# Patient Record
Sex: MALE | Race: BLACK OR AFRICAN AMERICAN
[De-identification: names, ages, dates, MRNs, and addresses within clinical notes are randomized per-mention and may not be internally consistent; named-entity substitution may affect disease eponyms.]

---

## 2019-12-23 PROBLEM — Z00.00 ENCOUNTER FOR PREVENTIVE HEALTH EXAMINATION: Status: ACTIVE | Noted: 2019-12-23

## 2019-12-29 ENCOUNTER — FORM ENCOUNTER (OUTPATIENT)
Age: 59
End: 2019-12-29

## 2019-12-30 ENCOUNTER — OUTPATIENT (OUTPATIENT)
Dept: OUTPATIENT SERVICES | Facility: HOSPITAL | Age: 59
LOS: 1 days | End: 2019-12-30
Payer: COMMERCIAL

## 2019-12-30 ENCOUNTER — APPOINTMENT (OUTPATIENT)
Dept: RADIOLOGY | Facility: CLINIC | Age: 59
End: 2019-12-30

## 2019-12-30 ENCOUNTER — APPOINTMENT (OUTPATIENT)
Dept: ORTHOPEDIC SURGERY | Facility: CLINIC | Age: 59
End: 2019-12-30
Payer: COMMERCIAL

## 2019-12-30 VITALS — BODY MASS INDEX: 27.98 KG/M2 | RESPIRATION RATE: 16 BRPM | WEIGHT: 225 LBS | HEIGHT: 75 IN

## 2019-12-30 DIAGNOSIS — Z82.3 FAMILY HISTORY OF STROKE: ICD-10-CM

## 2019-12-30 DIAGNOSIS — Z83.3 FAMILY HISTORY OF DIABETES MELLITUS: ICD-10-CM

## 2019-12-30 DIAGNOSIS — Z87.891 PERSONAL HISTORY OF NICOTINE DEPENDENCE: ICD-10-CM

## 2019-12-30 DIAGNOSIS — Z78.9 OTHER SPECIFIED HEALTH STATUS: ICD-10-CM

## 2019-12-30 DIAGNOSIS — Z82.49 FAMILY HISTORY OF ISCHEMIC HEART DISEASE AND OTHER DISEASES OF THE CIRCULATORY SYSTEM: ICD-10-CM

## 2019-12-30 PROCEDURE — 99204 OFFICE O/P NEW MOD 45 MIN: CPT | Mod: 25

## 2019-12-30 PROCEDURE — 20611 DRAIN/INJ JOINT/BURSA W/US: CPT | Mod: LT

## 2019-12-30 PROCEDURE — 73564 X-RAY EXAM KNEE 4 OR MORE: CPT | Mod: 26,LT

## 2019-12-30 RX ORDER — EMTRICITABINE AND TENOFOVIR ALAFENAMIDE 120; 15 MG/1; MG/1
TABLET ORAL
Refills: 0 | Status: ACTIVE | COMMUNITY

## 2020-03-02 ENCOUNTER — APPOINTMENT (OUTPATIENT)
Dept: ORTHOPEDIC SURGERY | Facility: CLINIC | Age: 60
End: 2020-03-02
Payer: COMMERCIAL

## 2020-03-02 PROCEDURE — 20611 DRAIN/INJ JOINT/BURSA W/US: CPT | Mod: LT

## 2020-06-02 ENCOUNTER — APPOINTMENT (OUTPATIENT)
Dept: ORTHOPEDIC SURGERY | Facility: CLINIC | Age: 60
End: 2020-06-02
Payer: COMMERCIAL

## 2020-06-02 PROCEDURE — 20611 DRAIN/INJ JOINT/BURSA W/US: CPT | Mod: LT

## 2020-09-01 ENCOUNTER — APPOINTMENT (OUTPATIENT)
Dept: ORTHOPEDIC SURGERY | Facility: CLINIC | Age: 60
End: 2020-09-01
Payer: COMMERCIAL

## 2020-09-01 PROCEDURE — 20610 DRAIN/INJ JOINT/BURSA W/O US: CPT | Mod: LT

## 2020-10-01 ENCOUNTER — APPOINTMENT (OUTPATIENT)
Dept: ORTHOPEDIC SURGERY | Facility: CLINIC | Age: 60
End: 2020-10-01

## 2021-01-05 ENCOUNTER — APPOINTMENT (OUTPATIENT)
Dept: ORTHOPEDIC SURGERY | Facility: CLINIC | Age: 61
End: 2021-01-05
Payer: COMMERCIAL

## 2021-01-05 PROCEDURE — 99072 ADDL SUPL MATRL&STAF TM PHE: CPT

## 2021-01-05 PROCEDURE — 99214 OFFICE O/P EST MOD 30 MIN: CPT | Mod: 25

## 2021-01-05 PROCEDURE — 20610 DRAIN/INJ JOINT/BURSA W/O US: CPT | Mod: LT

## 2021-01-06 ENCOUNTER — FORM ENCOUNTER (OUTPATIENT)
Age: 61
End: 2021-01-06

## 2021-02-01 ENCOUNTER — FORM ENCOUNTER (OUTPATIENT)
Age: 61
End: 2021-02-01

## 2021-02-16 ENCOUNTER — FORM ENCOUNTER (OUTPATIENT)
Age: 61
End: 2021-02-16

## 2021-02-17 ENCOUNTER — FORM ENCOUNTER (OUTPATIENT)
Age: 61
End: 2021-02-17

## 2021-04-20 ENCOUNTER — APPOINTMENT (OUTPATIENT)
Dept: ORTHOPEDIC SURGERY | Facility: CLINIC | Age: 61
End: 2021-04-20
Payer: COMMERCIAL

## 2021-04-20 PROCEDURE — 99072 ADDL SUPL MATRL&STAF TM PHE: CPT

## 2021-04-20 PROCEDURE — 20610 DRAIN/INJ JOINT/BURSA W/O US: CPT | Mod: LT

## 2021-07-22 ENCOUNTER — APPOINTMENT (OUTPATIENT)
Dept: ORTHOPEDIC SURGERY | Facility: CLINIC | Age: 61
End: 2021-07-22

## 2021-07-29 ENCOUNTER — APPOINTMENT (OUTPATIENT)
Dept: ORTHOPEDIC SURGERY | Facility: CLINIC | Age: 61
End: 2021-07-29
Payer: COMMERCIAL

## 2021-07-29 PROCEDURE — 20610 DRAIN/INJ JOINT/BURSA W/O US: CPT | Mod: LT

## 2021-10-28 ENCOUNTER — APPOINTMENT (OUTPATIENT)
Dept: ORTHOPEDIC SURGERY | Facility: CLINIC | Age: 61
End: 2021-10-28
Payer: COMMERCIAL

## 2021-10-28 VITALS — WEIGHT: 225 LBS | RESPIRATION RATE: 16 BRPM | BODY MASS INDEX: 27.98 KG/M2 | HEIGHT: 75 IN

## 2021-10-28 PROCEDURE — 20610 DRAIN/INJ JOINT/BURSA W/O US: CPT | Mod: LT

## 2022-03-07 ENCOUNTER — APPOINTMENT (OUTPATIENT)
Dept: ORTHOPEDIC SURGERY | Facility: CLINIC | Age: 62
End: 2022-03-07
Payer: COMMERCIAL

## 2022-03-07 VITALS — RESPIRATION RATE: 16 BRPM | WEIGHT: 225 LBS | HEIGHT: 75 IN | BODY MASS INDEX: 27.98 KG/M2

## 2022-03-07 PROCEDURE — 20610 DRAIN/INJ JOINT/BURSA W/O US: CPT | Mod: LT

## 2022-03-07 RX ORDER — OLMESARTAN MEDOXOMIL 5 MG/1
5 TABLET, FILM COATED ORAL
Qty: 60 | Refills: 0 | Status: DISCONTINUED | COMMUNITY
Start: 2022-02-07

## 2022-03-07 RX ORDER — AZITHROMYCIN 500 MG/1
500 TABLET, FILM COATED ORAL
Qty: 10 | Refills: 0 | Status: DISCONTINUED | COMMUNITY
Start: 2021-09-15

## 2022-03-07 RX ORDER — SULFAMETHOXAZOLE AND TRIMETHOPRIM 800; 160 MG/1; MG/1
800-160 TABLET ORAL
Qty: 28 | Refills: 0 | Status: DISCONTINUED | COMMUNITY
Start: 2021-10-14

## 2022-03-07 RX ORDER — AZITHROMYCIN 250 MG/1
250 TABLET, FILM COATED ORAL
Qty: 6 | Refills: 0 | Status: DISCONTINUED | COMMUNITY
Start: 2021-10-28

## 2022-03-08 ENCOUNTER — APPOINTMENT (OUTPATIENT)
Dept: ORTHOPEDIC SURGERY | Facility: CLINIC | Age: 62
End: 2022-03-08

## 2022-07-11 ENCOUNTER — APPOINTMENT (OUTPATIENT)
Dept: ORTHOPEDIC SURGERY | Facility: CLINIC | Age: 62
End: 2022-07-11

## 2022-07-11 VITALS — BODY MASS INDEX: 27.98 KG/M2 | WEIGHT: 225 LBS | HEIGHT: 75 IN | RESPIRATION RATE: 16 BRPM

## 2022-07-11 PROCEDURE — 20610 DRAIN/INJ JOINT/BURSA W/O US: CPT | Mod: LT

## 2022-10-17 ENCOUNTER — APPOINTMENT (OUTPATIENT)
Dept: ORTHOPEDIC SURGERY | Facility: CLINIC | Age: 62
End: 2022-10-17

## 2022-10-17 VITALS — WEIGHT: 225 LBS | BODY MASS INDEX: 27.98 KG/M2 | HEIGHT: 75 IN | RESPIRATION RATE: 16 BRPM

## 2022-10-17 PROCEDURE — 20610 DRAIN/INJ JOINT/BURSA W/O US: CPT | Mod: LT

## 2022-10-18 ENCOUNTER — FORM ENCOUNTER (OUTPATIENT)
Age: 62
End: 2022-10-18

## 2023-01-30 ENCOUNTER — APPOINTMENT (OUTPATIENT)
Dept: ORTHOPEDIC SURGERY | Facility: CLINIC | Age: 63
End: 2023-01-30
Payer: COMMERCIAL

## 2023-01-30 PROCEDURE — 20611 DRAIN/INJ JOINT/BURSA W/US: CPT | Mod: LT

## 2023-05-04 ENCOUNTER — APPOINTMENT (OUTPATIENT)
Dept: ORTHOPEDIC SURGERY | Facility: CLINIC | Age: 63
End: 2023-05-04
Payer: COMMERCIAL

## 2023-05-16 ENCOUNTER — APPOINTMENT (OUTPATIENT)
Dept: ORTHOPEDIC SURGERY | Facility: CLINIC | Age: 63
End: 2023-05-16
Payer: COMMERCIAL

## 2023-05-16 PROCEDURE — 20611 DRAIN/INJ JOINT/BURSA W/US: CPT | Mod: LT

## 2023-05-23 NOTE — PROCEDURE
[de-identified] : Procedure: Orthovisc injection of the left knee joint\par Indication: Inflammation and Joint pain. \par Risk, benefits and alternatives were discussed with the patient. Potential complications include bleeding, infection and allergic reaction. Verbal consent was obtained prior to the procedure. \par Alcohol was used to prep the area. Ethyl chloride spray was used as a topical anesthetic. Using sterile technique, the aspiration/injection needle was then directed from a lateral and superior aspect. The procedure was ultrasound guided. A 21 G 1.5 inch needle was used to inject 4 mL of Orthovisc Lot number below [id number] Exp: 06/30/2023\par \par A bandage was applied. The patient tolerated the procedure well. \par Complications: None. \par Patient instructed to avoid strenuous activity for 2 day(s). \par Follow-up in the office as needed and 3 months; pain unresolved; further concerns. Specifically discussed signs and symptoms of aseptic synovitis as well as septic arthritis, instructed patient to immediately present to the clinic (if open), or to an emergency room if these occur for further evaluation.\par \par  Elidel Counseling: Patient may experience a mild burning sensation during topical application. Elidel is not approved in children less than 2 years of age. There have been case reports of hematologic and skin malignancies in patients using topical calcineurin inhibitors although causality is questionable.

## 2023-10-30 ENCOUNTER — APPOINTMENT (OUTPATIENT)
Dept: ORTHOPEDIC SURGERY | Facility: CLINIC | Age: 63
End: 2023-10-30
Payer: COMMERCIAL

## 2023-10-30 PROCEDURE — 73564 X-RAY EXAM KNEE 4 OR MORE: CPT | Mod: LT

## 2023-10-30 PROCEDURE — 20610 DRAIN/INJ JOINT/BURSA W/O US: CPT | Mod: LT

## 2023-10-30 PROCEDURE — 99213 OFFICE O/P EST LOW 20 MIN: CPT | Mod: 25

## 2024-02-05 ENCOUNTER — APPOINTMENT (OUTPATIENT)
Dept: ORTHOPEDIC SURGERY | Facility: CLINIC | Age: 64
End: 2024-02-05
Payer: MEDICAID

## 2024-02-05 PROCEDURE — 20610 DRAIN/INJ JOINT/BURSA W/O US: CPT | Mod: LT

## 2024-02-05 NOTE — PROCEDURE
[de-identified] : 63 year male presents today for a follow-up evaluation of Left knee pain and to request a steroid injection today. Pt. states that the last Kenalog injection which was given on 10/23/2023 gave him relief for almost 3 months. Pt. states that he currently has so much pain in his knee. Pt rates the pain 10/10. Pt. states that he would like to receive another round of Steroid injection. Pt would like to do repeat HA injections once he changes insurance.   Procedure: Left knee corticosteroid (Kenalog) injection  Indication: [Left knee arthritis/inflammation/joint pain  An extensive discussion was had with the patient regarding this procedure and all questions were answered. All risks, benefits and alternatives were discussed with the patient. Potential complications were described which include, but are not limited to bleeding, infection, skin discoloration, allergic reaction, as well as, the risk of increase blood sugar. Verbal consent obtained from patient prior to procedure. Alcohol was used to prep the area. Ethyl chloride spray was then used as a topical anesthetic. Chlorhexidine was used to sterilize and prep the area over the superolateral aspect of the left knee. Using sterile technique, the injection needle was then directed from a lateral and superior aspect of the left knee joint.  A 1.5 inch 22-gauge needle was used to inject 4 cc of 1% Lidocaine without epinephrine, 4 cc 0.25% Bupivacaine without epinephrine and 1 cc 40mg/mL triamcinolone (Kenalog) into the joint. A sterile bandage was then applied. Th patient tolerated the procedure well and there were no complications.   Complications: None. Patient specifically educated/counseled regarding the signs and symptoms of potential intraarticular infection and instructed to present promptly to clinic or hospital if such signs and symptoms arise.  Patient instructed to avoid strenuous activity for 2 day(s). Patient may ice and elevate as needed for discomfort.   Follow-up in the office as needed in 3 months for repeat injection vs. pain remains unresolved vs. for further concerns.

## 2024-02-26 ENCOUNTER — APPOINTMENT (OUTPATIENT)
Dept: ORTHOPEDIC SURGERY | Facility: CLINIC | Age: 64
End: 2024-02-26
Payer: COMMERCIAL

## 2024-02-26 PROCEDURE — 72170 X-RAY EXAM OF PELVIS: CPT

## 2024-02-26 PROCEDURE — 99213 OFFICE O/P EST LOW 20 MIN: CPT

## 2024-03-06 ENCOUNTER — APPOINTMENT (OUTPATIENT)
Dept: ORTHOPEDIC SURGERY | Facility: CLINIC | Age: 64
End: 2024-03-06
Payer: MEDICAID

## 2024-03-06 DIAGNOSIS — T84.038A MECHANICAL LOOSENING OF OTHER INTERNAL PROSTHETIC JOINT, INITIAL ENCOUNTER: ICD-10-CM

## 2024-03-06 DIAGNOSIS — Z96.649 MECHANICAL LOOSENING OF OTHER INTERNAL PROSTHETIC JOINT, INITIAL ENCOUNTER: ICD-10-CM

## 2024-03-06 DIAGNOSIS — G89.29 PAIN IN LEFT HIP: ICD-10-CM

## 2024-03-06 DIAGNOSIS — M89.50 OSTEOLYSIS, UNSPECIFIED SITE: ICD-10-CM

## 2024-03-06 DIAGNOSIS — M25.552 PAIN IN LEFT HIP: ICD-10-CM

## 2024-03-06 DIAGNOSIS — Z87.39 PERSONAL HISTORY OF OTHER DISEASES OF THE MUSCULOSKELETAL SYSTEM AND CONNECTIVE TISSUE: ICD-10-CM

## 2024-03-06 PROCEDURE — 99204 OFFICE O/P NEW MOD 45 MIN: CPT

## 2024-03-06 RX ORDER — GLUCOSAMINE SULFATE 500 MG
CAPSULE ORAL
Refills: 0 | Status: ACTIVE | COMMUNITY

## 2024-03-06 RX ORDER — FLUOXETINE HCL 10 MG
TABLET ORAL
Refills: 0 | Status: ACTIVE | COMMUNITY

## 2024-03-06 RX ORDER — PSEUDOEPHEDRINE HCL 30 MG
TABLET ORAL
Refills: 0 | Status: ACTIVE | COMMUNITY

## 2024-03-11 ENCOUNTER — APPOINTMENT (OUTPATIENT)
Dept: ORTHOPEDIC SURGERY | Facility: CLINIC | Age: 64
End: 2024-03-11
Payer: COMMERCIAL

## 2024-03-11 DIAGNOSIS — M25.562 PAIN IN LEFT KNEE: ICD-10-CM

## 2024-03-11 DIAGNOSIS — G89.29 PAIN IN LEFT KNEE: ICD-10-CM

## 2024-03-11 DIAGNOSIS — M17.12 UNILATERAL PRIMARY OSTEOARTHRITIS, LEFT KNEE: ICD-10-CM

## 2024-03-11 DIAGNOSIS — M95.8 OTHER SPECIFIED ACQUIRED DEFORMITIES OF MUSCULOSKELETAL SYSTEM: ICD-10-CM

## 2024-03-11 PROCEDURE — 99213 OFFICE O/P EST LOW 20 MIN: CPT | Mod: 25

## 2024-03-11 PROCEDURE — 73502 X-RAY EXAM HIP UNI 2-3 VIEWS: CPT

## 2024-03-11 PROCEDURE — 20610 DRAIN/INJ JOINT/BURSA W/O US: CPT | Mod: LT

## 2024-03-14 NOTE — PHYSICAL EXAM
[de-identified] : Physical exam he is alert and oriented.  He is walking with a gait that appears to be a combination of a steppage gait and a circumduction gait.  His incision is well-healed.  He has some mild discomfort with range of motion of the hip.  He is neurologically intact. [de-identified] : Some previous x-rays of the hip are taken.  There is an uncemented hip arthroplasty in place.  Is a fully porous-coated implant.  Is incompletely visualized on several of the views but on the view where I can see it on the AP completely I think there is loosening.  There appears to be a pedestal below the tip.  There is lucency under the collar the implant appears to be likely subsided from its original position although I do not have comparison films.  Completion films of the femur are reviewed.  They show signs of loosening with pedestal.  No other abnormalities noted.

## 2024-03-14 NOTE — DISCUSSION/SUMMARY
[de-identified] : S/P total hip replacement, and abnormalities of gait.  I would like to get more complete films but based on my assessment of the films review have I believe the femoral component is loose.  There is lucencies around the stem as well as likely subsidence of the implant.  I will see any obvious loosening of the cup.  We had a long discussion regarding this issue.  He feels it has been present for a long time and is not a significant change. Way to address the loose implant would be with revision surgery.  Before doing so I would certainly send some screening labs for infection and he does have some risk factors for that although he has no obvious signs or symptoms of that at this time.  We discussed the fact that even though the implant is loose osteotomy could be needed given the fully porous-coated implant is in place.  We discussed the cup to be prepared to revise that if it was needed as well.  We discussed the recovery and risks that are involved in revision surgery including the risk shown below.   Category 1 includes risks that could occur in association with any operation. They include heart attack, stroke, blood clot and pulmonary embolism, wound infection, transfusion reaction, drug allergy, and complications related to anesthesia. This list is not intended to be complete but only to convey a broad range of general medical risks to be aware of.  Blood clots may lead to a block in circulation. A blood clot that completely blocks a large artery can lead to gangrene. If this happens an amputation may be required. Blood clots in leg veins lead to pain and swelling. If part of the clot breaks off it can travel to the brain and lead to a stroke. A clot can also travel to the lung, resulting in a pulmonary embolism. Medication after surgery will minimize but not eliminate these complications.  Category 2 is a list of risks and complications specifically related to total or partial joint replacement. This list is not complete but is intended to make the patient aware of the kinds of implant-related risks and complications that might occur.    1. If the device gets loose or wears out further surgery may be required to revise the prosthesis. 2. If an infection develops, further surgery to washout the joint, remove or replace parts, or insert an antibiotic spacer may be required 3. Muscle, Tendon, Nerve and blood vessel damage may result as a consequence of mobilization of the joint or dissection near these structures. These injuries can lead to weakness, numbness and paralysis. The damage may be temporary or permanent. The recovery process can be long and may require further procedures.   4. Dislocations and instability may also require further surgery.   5. Periprosthetic fracture requiring revision surgery. 6. Leg length discrepancy  7. Stiffness 8. Wound complications requiring either local wound care, revision surgery, or plastic surgery consultation  9. Chronic pain requiring pain management  We also reviewed the risks of continued nonoperative treatment which include continued or worsening pain and gait abnormalities as well as the risk of fracture from a loose implant.  We discussed the possibility of shoe lift in the short-term but given the magnitude of the difference I think he would likely need it to be external and he is not sure if he would like to proceed with that.  At this time he wants to consider further.  He is coming for reevaluation follow-up of his knee next week and we will get completion films of the femur at that time.  He is going to consider our discussion and will return when he is internalized and is ready to discuss further.  All questions answered.   Addendum: Completion films of the femur were reviewed.  He continues show evidence for loosening of the implant with pedestal but no other abnormalities noted.  Plan as above.  Will discuss findings with patient.  Addendum: I spoke to the patient above.  He is going to consider and will follow-up in the office when he is ready to discuss further.  All questions answered.

## 2024-03-14 NOTE — CONSULT LETTER
[FreeTextEntry2] : Dr. Roche [FreeTextEntry1] : I had the privilege of evaluating your patient today. I have enclosed my office note for your reference. If you have any questions, concerns or further input, please do not hesitate to contact me.  Thank you for allowing me to participate in the care of your patient.  Sincerely, Brian Diaz MD

## 2024-03-14 NOTE — HISTORY OF PRESENT ILLNESS
[de-identified] : Patient is 63-year-old gentleman comes in for evaluation of his left hip with a referral to Dr. Roche.  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 He reports viral load currently undetectable. .  There are no obvious complications with the surgery however he is felt for some time that he had a leg length discrepancy and that he has limped as a result.  He has also had chronic pain.  The pain is located anteriorly in the groin as well as of the knee.  Denies significant thigh pain.  Pain is worse with activity and he does have symptoms that are worse when he first gets up.  He denies any specific trauma or injury or obvious known infections.  Takes acetaminophen and Aleve as well as sometimes a combination drugs for pain.

## 2024-03-14 NOTE — REVIEW OF SYSTEMS
[Arthralgia] : arthralgia [Joint Pain] : no joint pain [Joint Stiffness] : no joint stiffness [Joint Swelling] : no joint swelling [Fever] : no fever [Chills] : no chills

## 2024-03-27 ENCOUNTER — APPOINTMENT (OUTPATIENT)
Dept: ORTHOPEDIC SURGERY | Facility: CLINIC | Age: 64
End: 2024-03-27
Payer: MEDICAID

## 2024-03-27 VITALS
BODY MASS INDEX: 27.98 KG/M2 | DIASTOLIC BLOOD PRESSURE: 85 MMHG | SYSTOLIC BLOOD PRESSURE: 134 MMHG | HEIGHT: 75 IN | WEIGHT: 225 LBS | OXYGEN SATURATION: 95 % | HEART RATE: 70 BPM

## 2024-03-27 DIAGNOSIS — Z96.642 PRESENCE OF LEFT ARTIFICIAL HIP JOINT: ICD-10-CM

## 2024-03-27 DIAGNOSIS — T84.031A: ICD-10-CM

## 2024-03-27 PROCEDURE — 99213 OFFICE O/P EST LOW 20 MIN: CPT

## 2024-03-27 NOTE — DISCUSSION/SUMMARY
[de-identified] : 63-year-old gentleman with loosening of the left hip.  This loose prosthesis is impacting his function and ability to walk and is now requiring assistive devices in addition to experiencing increasing pain and limitations and worsening limp.  He feels at this point in time that the pain is escalated enough is interfering his life enough that he would like surgical treatment given his exam and imaging findings and symptoms I think this is reasonable.  This loosening can be septic versus aseptic.  We will send for screening labs and depending on the results may consider preoperative aspiration.  In addition we would assess for infection at surgery if there was concern for that, and if we found significant evidence for infection before at surgery then we would treat this in a staged fashion with spacer prior to reconstruction.  Otherwise if this is an aseptic process then I would revise the femur at a minimum.  We discussed the cup and be prepared to revise if needed.  The potential for osteotomy to remove this implant displaced loosening exist and we discussed that at length.  We discussed the risk and wrote recovery of the various procedures at length as well.  Specific risks discussed and shown below.  Category 1 includes risks that could occur in association with any operation. They include heart attack, stroke, blood clot and pulmonary embolism, wound infection, transfusion reaction, drug allergy, and complications related to anesthesia. This list is not intended to be complete but only to convey a broad range of general medical risks to be aware of.  Blood clots may lead to a block in circulation. A blood clot that completely blocks a large artery can lead to gangrene. If this happens an amputation may be required. Blood clots in leg veins lead to pain and swelling. If part of the clot breaks off it can travel to the brain and lead to a stroke. A clot can also travel to the lung, resulting in a pulmonary embolism. Medication after surgery will minimize but not eliminate these complications.  Category 2 is a list of risks and complications specifically related to total or partial joint replacement. This list is not complete but is intended to make the patient aware of the kinds of implant-related risks and complications that might occur.    1. If the device gets loose or wears out further surgery may be required to revise the prosthesis. 2. If an infection develops, further surgery to washout the joint, remove or replace parts, or insert an antibiotic spacer may be required 3. Muscle, Tendon, Nerve and blood vessel damage may result as a consequence of mobilization of the joint or dissection near these structures. These injuries can lead to weakness, numbness and paralysis. The damage may be temporary or permanent. The recovery process can be long and may require further procedures.   4. Dislocations and instability may also require further surgery.   5. Periprosthetic fracture requiring revision surgery. 6. Leg length discrepancy  7. Stiffness 8. Wound complications requiring either local wound care, revision surgery, or plastic surgery consultation  9. Chronic pain requiring pain management  He expressed understanding of this and again desire to proceed although he wants to finalize his social situation first which is reasonable.  He will contact our office when he is identified an appropriate time for surgery.  He understands we will need to in addition to sending the screening ESR and CRP to begin the evaluation for infection evaluate the medical situation with labs and primary care physician as well as HIV control.  Patient expressed understanding of all this and desire to proceed.  All questions answered.  Plan: Left hip revision  Evaluations: PCP including documented control of HIV  Equipment; hip stem removal instruments ; pencil-tip bur;  gigly saw; midas ; Paras explant system; Dall-Miles cables with [place] trochanteric  plate; Paras Exodus removal system; long drill bit and cannulated reamer; Paras restoration modular stem; C arm fluoroscopy; [employer] revision readapt cup with liners and dual mobility backup

## 2024-03-27 NOTE — HISTORY OF PRESENT ILLNESS
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 He reports viral load currently undetectable. At last visit, his x-ray showed loosening of the femoral component and we sent him for femur x-rays for further evaluation. He reports the hip pain is very debilitating and he is now using a cane for some support. He presents for x-ray review and for discussion regarding revision hip replacement surgery. Denies fever or chills.

## 2024-03-27 NOTE — REVIEW OF SYSTEMS
[Joint Pain] : joint pain [Joint Stiffness] : joint stiffness [Negative] : Constitutional [Fever] : no fever [Chills] : no chills

## 2024-03-27 NOTE — PHYSICAL EXAM
[de-identified] : General: AxO x 3  Ambulating with an antalgic and steppage type gait but there is no Trendelenburg   Neuro: 5/5 strength with foot eversion, foot inversion, dorsiflexion, plantar flexion, sensation is intact over the lower extremity in L2-S1 nerve distributions. 2+ dorsalis pedis and posterior tibial pulses. Negative Homans sign   Skin: well healed surgical scar, no signs of infection   Hip: No instability appreciated through gentle ROM. HF: 90   There is a leg length discrepancy is significant with the left leg being shorter than the right     [de-identified] : No new x-rays today but previous x-rays show a fully porous-coated implant with evidence for surrounding lucency and clear subsidence of the implant.  The AP and x-rays however at has noticed that how you frog lateral view shows some lucency around the acetabular cup but no definite loosening think

## 2024-08-07 ENCOUNTER — APPOINTMENT (OUTPATIENT)
Dept: ORTHOPEDIC SURGERY | Facility: CLINIC | Age: 64
End: 2024-08-07

## 2024-08-07 PROCEDURE — 20610 DRAIN/INJ JOINT/BURSA W/O US: CPT | Mod: LT

## 2024-11-06 ENCOUNTER — APPOINTMENT (OUTPATIENT)
Dept: ORTHOPEDIC SURGERY | Facility: CLINIC | Age: 64
End: 2024-11-06
Payer: MEDICAID

## 2024-11-06 VITALS — HEIGHT: 75 IN | WEIGHT: 212 LBS | BODY MASS INDEX: 26.36 KG/M2

## 2024-11-06 DIAGNOSIS — M17.12 UNILATERAL PRIMARY OSTEOARTHRITIS, LEFT KNEE: ICD-10-CM

## 2024-11-06 PROCEDURE — 20610 DRAIN/INJ JOINT/BURSA W/O US: CPT | Mod: LT

## 2025-02-24 ENCOUNTER — APPOINTMENT (OUTPATIENT)
Dept: ORTHOPEDIC SURGERY | Facility: CLINIC | Age: 65
End: 2025-02-24
Payer: MEDICARE

## 2025-02-24 VITALS — WEIGHT: 212 LBS | BODY MASS INDEX: 26.36 KG/M2 | HEIGHT: 75 IN | RESPIRATION RATE: 18 BRPM

## 2025-02-24 DIAGNOSIS — M17.12 UNILATERAL PRIMARY OSTEOARTHRITIS, LEFT KNEE: ICD-10-CM

## 2025-02-24 PROCEDURE — 20610 DRAIN/INJ JOINT/BURSA W/O US: CPT | Mod: LT

## 2025-03-13 ENCOUNTER — APPOINTMENT (OUTPATIENT)
Dept: RADIOLOGY | Facility: CLINIC | Age: 65
End: 2025-03-13
Payer: MEDICARE

## 2025-03-13 ENCOUNTER — APPOINTMENT (OUTPATIENT)
Dept: PHYSICAL MEDICINE AND REHAB | Facility: CLINIC | Age: 65
End: 2025-03-13

## 2025-03-13 ENCOUNTER — OUTPATIENT (OUTPATIENT)
Dept: OUTPATIENT SERVICES | Facility: HOSPITAL | Age: 65
LOS: 1 days | End: 2025-03-13

## 2025-03-13 VITALS
DIASTOLIC BLOOD PRESSURE: 77 MMHG | BODY MASS INDEX: 26.36 KG/M2 | SYSTOLIC BLOOD PRESSURE: 105 MMHG | WEIGHT: 212 LBS | OXYGEN SATURATION: 94 % | HEIGHT: 75 IN | HEART RATE: 76 BPM

## 2025-03-13 DIAGNOSIS — M54.9 DORSALGIA, UNSPECIFIED: ICD-10-CM

## 2025-03-13 DIAGNOSIS — M53.3 SACROCOCCYGEAL DISORDERS, NOT ELSEWHERE CLASSIFIED: ICD-10-CM

## 2025-03-13 PROCEDURE — 99205 OFFICE O/P NEW HI 60 MIN: CPT

## 2025-03-13 PROCEDURE — 72110 X-RAY EXAM L-2 SPINE 4/>VWS: CPT | Mod: 26

## 2025-03-18 ENCOUNTER — APPOINTMENT (OUTPATIENT)
Dept: PHYSICAL MEDICINE AND REHAB | Facility: CLINIC | Age: 65
End: 2025-03-18
Payer: MEDICARE

## 2025-03-18 VITALS
HEIGHT: 75 IN | WEIGHT: 212 LBS | DIASTOLIC BLOOD PRESSURE: 83 MMHG | BODY MASS INDEX: 26.36 KG/M2 | HEART RATE: 86 BPM | SYSTOLIC BLOOD PRESSURE: 120 MMHG | OXYGEN SATURATION: 96 %

## 2025-03-18 PROBLEM — M53.3 SACROILIAC PAIN: Status: ACTIVE | Noted: 2025-03-18

## 2025-03-18 PROCEDURE — 76942 ECHO GUIDE FOR BIOPSY: CPT

## 2025-03-18 PROCEDURE — 20552 NJX 1/MLT TRIGGER POINT 1/2: CPT | Mod: RT

## 2025-04-10 ENCOUNTER — APPOINTMENT (OUTPATIENT)
Dept: PHYSICAL MEDICINE AND REHAB | Facility: CLINIC | Age: 65
End: 2025-04-10

## 2025-04-10 VITALS
HEIGHT: 75 IN | OXYGEN SATURATION: 93 % | BODY MASS INDEX: 26.36 KG/M2 | WEIGHT: 212 LBS | DIASTOLIC BLOOD PRESSURE: 85 MMHG | HEART RATE: 95 BPM | SYSTOLIC BLOOD PRESSURE: 109 MMHG

## 2025-04-10 DIAGNOSIS — M47.816 SPONDYLOSIS W/OUT MYELOPATHY OR RADICULOPATHY, LUMBAR REGION: ICD-10-CM

## 2025-04-10 PROCEDURE — 99214 OFFICE O/P EST MOD 30 MIN: CPT

## 2025-05-21 ENCOUNTER — APPOINTMENT (OUTPATIENT)
Age: 65
End: 2025-05-21
Payer: MEDICARE

## 2025-05-21 PROCEDURE — 64636 DESTROY L/S FACET JNT ADDL: CPT | Mod: RT

## 2025-05-21 PROCEDURE — 64635 DESTROY LUMB/SAC FACET JNT: CPT | Mod: RT

## 2025-06-02 ENCOUNTER — APPOINTMENT (OUTPATIENT)
Dept: PHYSICAL MEDICINE AND REHAB | Facility: CLINIC | Age: 65
End: 2025-06-02
Payer: MEDICARE

## 2025-06-02 PROCEDURE — 99212 OFFICE O/P EST SF 10 MIN: CPT | Mod: 93

## 2025-06-15 VITALS
WEIGHT: 212.08 LBS | OXYGEN SATURATION: 99 % | SYSTOLIC BLOOD PRESSURE: 126 MMHG | RESPIRATION RATE: 16 BRPM | HEART RATE: 68 BPM | TEMPERATURE: 98 F | DIASTOLIC BLOOD PRESSURE: 78 MMHG | HEIGHT: 74 IN

## 2025-06-15 LAB
ADD ON TEST-SPECIMEN IN LAB: SIGNIFICANT CHANGE UP
ANION GAP SERPL CALC-SCNC: 7 MMOL/L — SIGNIFICANT CHANGE UP (ref 5–17)
APTT BLD: 28.4 SEC — SIGNIFICANT CHANGE UP (ref 26.1–36.8)
BASOPHILS # BLD AUTO: 0.02 K/UL — SIGNIFICANT CHANGE UP (ref 0–0.2)
BASOPHILS NFR BLD AUTO: 0.2 % — SIGNIFICANT CHANGE UP (ref 0–2)
BLD GP AB SCN SERPL QL: NEGATIVE — SIGNIFICANT CHANGE UP
BUN SERPL-MCNC: 19 MG/DL — SIGNIFICANT CHANGE UP (ref 7–23)
CALCIUM SERPL-MCNC: 9.4 MG/DL — SIGNIFICANT CHANGE UP (ref 8.4–10.5)
CHLORIDE SERPL-SCNC: 102 MMOL/L — SIGNIFICANT CHANGE UP (ref 96–108)
CO2 SERPL-SCNC: 28 MMOL/L — SIGNIFICANT CHANGE UP (ref 22–31)
CREAT SERPL-MCNC: 1.46 MG/DL — HIGH (ref 0.5–1.3)
EGFR: 53 ML/MIN/1.73M2 — LOW
EGFR: 53 ML/MIN/1.73M2 — LOW
EOSINOPHIL # BLD AUTO: 0.04 K/UL — SIGNIFICANT CHANGE UP (ref 0–0.5)
EOSINOPHIL NFR BLD AUTO: 0.5 % — SIGNIFICANT CHANGE UP (ref 0–6)
GLUCOSE SERPL-MCNC: 85 MG/DL — SIGNIFICANT CHANGE UP (ref 70–99)
HCT VFR BLD CALC: 39.1 % — SIGNIFICANT CHANGE UP (ref 39–50)
HGB BLD-MCNC: 12.5 G/DL — LOW (ref 13–17)
IMM GRANULOCYTES NFR BLD AUTO: 0.2 % — SIGNIFICANT CHANGE UP (ref 0–0.9)
INR BLD: 0.98 — SIGNIFICANT CHANGE UP (ref 0.85–1.16)
LYMPHOCYTES # BLD AUTO: 1.79 K/UL — SIGNIFICANT CHANGE UP (ref 1–3.3)
LYMPHOCYTES # BLD AUTO: 21.8 % — SIGNIFICANT CHANGE UP (ref 13–44)
MCHC RBC-ENTMCNC: 31 PG — SIGNIFICANT CHANGE UP (ref 27–34)
MCHC RBC-ENTMCNC: 32 G/DL — SIGNIFICANT CHANGE UP (ref 32–36)
MCV RBC AUTO: 97 FL — SIGNIFICANT CHANGE UP (ref 80–100)
MONOCYTES # BLD AUTO: 0.69 K/UL — SIGNIFICANT CHANGE UP (ref 0–0.9)
MONOCYTES NFR BLD AUTO: 8.4 % — SIGNIFICANT CHANGE UP (ref 2–14)
NEUTROPHILS # BLD AUTO: 5.64 K/UL — SIGNIFICANT CHANGE UP (ref 1.8–7.4)
NEUTROPHILS NFR BLD AUTO: 68.9 % — SIGNIFICANT CHANGE UP (ref 43–77)
NRBC BLD AUTO-RTO: 0 /100 WBCS — SIGNIFICANT CHANGE UP (ref 0–0)
PLATELET # BLD AUTO: 179 K/UL — SIGNIFICANT CHANGE UP (ref 150–400)
POTASSIUM SERPL-MCNC: 4.2 MMOL/L — SIGNIFICANT CHANGE UP (ref 3.5–5.3)
POTASSIUM SERPL-SCNC: 4.2 MMOL/L — SIGNIFICANT CHANGE UP (ref 3.5–5.3)
PROTHROM AB SERPL-ACNC: 11.5 SEC — SIGNIFICANT CHANGE UP (ref 9.9–13.4)
RBC # BLD: 4.03 M/UL — LOW (ref 4.2–5.8)
RBC # FLD: 12.4 % — SIGNIFICANT CHANGE UP (ref 10.3–14.5)
RH IG SCN BLD-IMP: POSITIVE — SIGNIFICANT CHANGE UP
SODIUM SERPL-SCNC: 137 MMOL/L — SIGNIFICANT CHANGE UP (ref 135–145)
WBC # BLD: 8.2 K/UL — SIGNIFICANT CHANGE UP (ref 3.8–10.5)
WBC # FLD AUTO: 8.2 K/UL — SIGNIFICANT CHANGE UP (ref 3.8–10.5)

## 2025-06-15 PROCEDURE — 76376 3D RENDER W/INTRP POSTPROCES: CPT | Mod: 26

## 2025-06-15 PROCEDURE — 72192 CT PELVIS W/O DYE: CPT | Mod: 26

## 2025-06-15 PROCEDURE — 73552 X-RAY EXAM OF FEMUR 2/>: CPT | Mod: 26,LT

## 2025-06-15 PROCEDURE — 72100 X-RAY EXAM L-S SPINE 2/3 VWS: CPT | Mod: 26

## 2025-06-15 PROCEDURE — 99285 EMERGENCY DEPT VISIT HI MDM: CPT

## 2025-06-15 PROCEDURE — 73502 X-RAY EXAM HIP UNI 2-3 VIEWS: CPT | Mod: 26,LT

## 2025-06-15 NOTE — ED ADULT NURSE NOTE - OBJECTIVE STATEMENT
64y male c/o L hip pain atraumatic. pt states he had  replacement in 2007 and was due for another one is 2017. pt states he was unable to walk this AM. pt denies numbness/tingling, CP, SOB.

## 2025-06-15 NOTE — ED ADULT NURSE REASSESSMENT NOTE - NS ED NURSE REASSESS COMMENT FT1
Received report from outgoing RN Shagufta PETERSEN Received patient in stretcher. AOX4. Vital signs (stable) as noted in flowsheet.  Patient respirations even and unlabored. NAD noted. Patient oriented to ED area. Plan of care discussed and verbalized understanding. CTr and ortho recommendations pending. All needs attended. Fall risk precautions maintained. Pt reports L hip pain tolerable as long as he does not move. Pt declines pain medications at this time. Purposeful proactive hourly rounding in progress.

## 2025-06-15 NOTE — ED PROVIDER NOTE - CLINICAL SUMMARY MEDICAL DECISION MAKING FREE TEXT BOX
64-year-old male with history of left hip replacement 2007 presenting with left hip pain x 1-2d. Very likely has a dislocated artificial L hip joint--will obtain XR to confirm. Overall comfortable at rest, neurovascularly intact distally, soft compartments. No preceding trauma/fall--low suspicion for associated fracture. Anticipate will need ortho eval and reduction.

## 2025-06-15 NOTE — ED ADULT TRIAGE NOTE - OTHER COMPLAINTS
c.o L hip pain with radiation to L knee since last night. denies trauma or fall. hx hip replacement in 2007. c.o L hip pain with radiation to L knee since last night. denies trauma or fall. hx hip replacement in 2007. pt unable to ambulate or bear weight.

## 2025-06-15 NOTE — ED ADULT NURSE NOTE - NSFALLRISKINTERV_ED_ALL_ED
Assistance OOB with selected safe patient handling equipment if applicable/Assistance with ambulation/Communicate fall risk and risk factors to all staff, patient, and family/Monitor gait and stability/Provide visual cue: yellow wristband, yellow gown, etc/Reinforce activity limits and safety measures with patient and family/Call bell, personal items and telephone in reach/Instruct patient to call for assistance before getting out of bed/chair/stretcher/Non-slip footwear applied when patient is off stretcher/[place] to call system/Physically safe environment - no spills, clutter or unnecessary equipment/Purposeful Proactive Rounding/Room/bathroom lighting operational, light cord in reach

## 2025-06-15 NOTE — ED PROVIDER NOTE - PROGRESS NOTE DETAILS
Reggie Javier MD: XR reviewed--does not appear to have a dislocation of the L artificial hip joint. Ortho team consulted and at bedside for eval. Pending recs. Will obtain pIV access for now--anticipate will require admission--patient is not able to ambulate elizabeth - received on sign out pending XR / CT results and dispo.  creatinine 1.46, labs otherwise unremarkable.   XR "IMPRESSION: Lucency surrounding the femoral component concerning for   component loosening. There is chronic remodeling of the left iliac bone   related to a high riding appearance of the greater trochanter. There is   no fracture identified.  Mild degenerative change of the right hip. Lower lumbar degenerative   changes.  Correlation with CT scan is recommended.  Moderate degenerative disease at L3-L4, L4-L5 and L5-S1 with disc space   narrowing and osteophyte formation. Minimal scoliosis that is convex to   the left. Mild retrolisthesis at L2 and L2. No fracture identified."    CT "IMPRESSION:  1.   No acute fracture seen.  2.   Left hip arthroplasty , with perihardware lucency along the femoral   stem, can be due to loosening and/or infection. Correlate clinically .  3.   Moderate-to-severe prostatomegaly.  4.   Urinary bladder wall thickening may be due to cystitis or obstructive uropathy."    CT as above.   seen by ortho , no acute interventions  pt unable to ambulate, offered discharge with crutches but pt does not feel comfortable going home with being unable to ambulate.   pt admitted to medicine for PT / pain control. weight bear as tolerated.

## 2025-06-15 NOTE — ED PROVIDER NOTE - PHYSICAL EXAMINATION
Gen - NAD; well-appearing; A+Ox3  HEENT - NCAT, EOMI, moist mucous membranes  Neck - supple  Resp - CTAB, no increased WOB  CV -  RRR, no m/r/g  Abd - soft, NT, ND; no guarding or rebound  Back - no midline, paraspinous, or CVA tenderness  MSK - FROM of b/l UE and LE with exception to L hip joint, shortened LLE, unable to range L hip joint, NVI distally, soft compartments, no joint effusion  Extrem - no LE edema/erythema  Neuro - no focal motor or sensation deficits  Skin - warm, well perfused; no rash or lesions

## 2025-06-15 NOTE — ED PROVIDER NOTE - OBJECTIVE STATEMENT
64-year-old male with history of left hip replacement 2007 presenting with left hip pain x 1-2d. States that last night he felt that his left hip joint popped out of the socket, has had similar occurrence at home before and is usually able to reduce the joint himself by lifting the left leg however cannot at this time.  Has pain to the left hip that radiates down to the knee.  Currently not able to walk. No numbness, trauma to area, fall, skin change.

## 2025-06-15 NOTE — ED ADULT NURSE NOTE - OTHER COMPLAINTS
c.o L hip pain with radiation to L knee since last night. denies trauma or fall. hx hip replacement in 2007. pt unable to ambulate or bear weight.

## 2025-06-16 ENCOUNTER — INPATIENT (INPATIENT)
Facility: HOSPITAL | Age: 65
LOS: 2 days | Discharge: HOME HEALTH | DRG: 560 | End: 2025-06-19
Attending: STUDENT IN AN ORGANIZED HEALTH CARE EDUCATION/TRAINING PROGRAM | Admitting: INTERNAL MEDICINE
Payer: MEDICARE

## 2025-06-16 ENCOUNTER — APPOINTMENT (OUTPATIENT)
Dept: ORTHOPEDIC SURGERY | Facility: CLINIC | Age: 65
End: 2025-06-16

## 2025-06-16 DIAGNOSIS — G47.00 INSOMNIA, UNSPECIFIED: ICD-10-CM

## 2025-06-16 DIAGNOSIS — D64.9 ANEMIA, UNSPECIFIED: ICD-10-CM

## 2025-06-16 DIAGNOSIS — I10 ESSENTIAL (PRIMARY) HYPERTENSION: ICD-10-CM

## 2025-06-16 DIAGNOSIS — N18.30 CHRONIC KIDNEY DISEASE, STAGE 3 UNSPECIFIED: ICD-10-CM

## 2025-06-16 DIAGNOSIS — B20 HUMAN IMMUNODEFICIENCY VIRUS [HIV] DISEASE: ICD-10-CM

## 2025-06-16 DIAGNOSIS — N40.0 BENIGN PROSTATIC HYPERPLASIA WITHOUT LOWER URINARY TRACT SYMPTOMS: ICD-10-CM

## 2025-06-16 DIAGNOSIS — F32.9 MAJOR DEPRESSIVE DISORDER, SINGLE EPISODE, UNSPECIFIED: ICD-10-CM

## 2025-06-16 DIAGNOSIS — T84.038A MECHANICAL LOOSENING OF OTHER INTERNAL PROSTHETIC JOINT, INITIAL ENCOUNTER: ICD-10-CM

## 2025-06-16 DIAGNOSIS — M47.816 SPONDYLOSIS WITHOUT MYELOPATHY OR RADICULOPATHY, LUMBAR REGION: ICD-10-CM

## 2025-06-16 DIAGNOSIS — Z29.9 ENCOUNTER FOR PROPHYLACTIC MEASURES, UNSPECIFIED: ICD-10-CM

## 2025-06-16 LAB
ANION GAP SERPL CALC-SCNC: 7 MMOL/L — SIGNIFICANT CHANGE UP (ref 5–17)
APPEARANCE UR: CLEAR — SIGNIFICANT CHANGE UP
BASOPHILS # BLD AUTO: 0.04 K/UL — SIGNIFICANT CHANGE UP (ref 0–0.2)
BASOPHILS NFR BLD AUTO: 0.6 % — SIGNIFICANT CHANGE UP (ref 0–2)
BILIRUB UR-MCNC: NEGATIVE — SIGNIFICANT CHANGE UP
BUN SERPL-MCNC: 18 MG/DL — SIGNIFICANT CHANGE UP (ref 7–23)
CALCIUM SERPL-MCNC: 8.4 MG/DL — SIGNIFICANT CHANGE UP (ref 8.4–10.5)
CHLORIDE SERPL-SCNC: 109 MMOL/L — HIGH (ref 96–108)
CO2 SERPL-SCNC: 27 MMOL/L — SIGNIFICANT CHANGE UP (ref 22–31)
COLOR SPEC: YELLOW — SIGNIFICANT CHANGE UP
CREAT SERPL-MCNC: 1.45 MG/DL — HIGH (ref 0.5–1.3)
DIFF PNL FLD: NEGATIVE — SIGNIFICANT CHANGE UP
EGFR: 53 ML/MIN/1.73M2 — LOW
EGFR: 53 ML/MIN/1.73M2 — LOW
EOSINOPHIL # BLD AUTO: 0.08 K/UL — SIGNIFICANT CHANGE UP (ref 0–0.5)
EOSINOPHIL NFR BLD AUTO: 1.3 % — SIGNIFICANT CHANGE UP (ref 0–6)
GLUCOSE SERPL-MCNC: 90 MG/DL — SIGNIFICANT CHANGE UP (ref 70–99)
GLUCOSE UR QL: NEGATIVE MG/DL — SIGNIFICANT CHANGE UP
HCT VFR BLD CALC: 36.4 % — LOW (ref 39–50)
HGB BLD-MCNC: 11.6 G/DL — LOW (ref 13–17)
IMM GRANULOCYTES NFR BLD AUTO: 0.3 % — SIGNIFICANT CHANGE UP (ref 0–0.9)
KETONES UR QL: ABNORMAL MG/DL
LEUKOCYTE ESTERASE UR-ACNC: NEGATIVE — SIGNIFICANT CHANGE UP
LYMPHOCYTES # BLD AUTO: 1.62 K/UL — SIGNIFICANT CHANGE UP (ref 1–3.3)
LYMPHOCYTES # BLD AUTO: 26 % — SIGNIFICANT CHANGE UP (ref 13–44)
MAGNESIUM SERPL-MCNC: 1.8 MG/DL — SIGNIFICANT CHANGE UP (ref 1.6–2.6)
MCHC RBC-ENTMCNC: 31 PG — SIGNIFICANT CHANGE UP (ref 27–34)
MCHC RBC-ENTMCNC: 31.9 G/DL — LOW (ref 32–36)
MCV RBC AUTO: 97.3 FL — SIGNIFICANT CHANGE UP (ref 80–100)
MONOCYTES # BLD AUTO: 0.62 K/UL — SIGNIFICANT CHANGE UP (ref 0–0.9)
MONOCYTES NFR BLD AUTO: 10 % — SIGNIFICANT CHANGE UP (ref 2–14)
NEUTROPHILS # BLD AUTO: 3.85 K/UL — SIGNIFICANT CHANGE UP (ref 1.8–7.4)
NEUTROPHILS NFR BLD AUTO: 61.8 % — SIGNIFICANT CHANGE UP (ref 43–77)
NITRITE UR-MCNC: NEGATIVE — SIGNIFICANT CHANGE UP
NRBC BLD AUTO-RTO: 0 /100 WBCS — SIGNIFICANT CHANGE UP (ref 0–0)
PH UR: 6.5 — SIGNIFICANT CHANGE UP (ref 5–8)
PHOSPHATE SERPL-MCNC: 3.7 MG/DL — SIGNIFICANT CHANGE UP (ref 2.5–4.5)
PLATELET # BLD AUTO: 157 K/UL — SIGNIFICANT CHANGE UP (ref 150–400)
POTASSIUM SERPL-MCNC: 4.2 MMOL/L — SIGNIFICANT CHANGE UP (ref 3.5–5.3)
POTASSIUM SERPL-SCNC: 4.2 MMOL/L — SIGNIFICANT CHANGE UP (ref 3.5–5.3)
PROT UR-MCNC: NEGATIVE MG/DL — SIGNIFICANT CHANGE UP
RBC # BLD: 3.74 M/UL — LOW (ref 4.2–5.8)
RBC # FLD: 12.5 % — SIGNIFICANT CHANGE UP (ref 10.3–14.5)
SODIUM SERPL-SCNC: 143 MMOL/L — SIGNIFICANT CHANGE UP (ref 135–145)
SP GR SPEC: 1.02 — SIGNIFICANT CHANGE UP (ref 1–1.03)
UROBILINOGEN FLD QL: 1 MG/DL — SIGNIFICANT CHANGE UP (ref 0.2–1)
WBC # BLD: 6.23 K/UL — SIGNIFICANT CHANGE UP (ref 3.8–10.5)
WBC # FLD AUTO: 6.23 K/UL — SIGNIFICANT CHANGE UP (ref 3.8–10.5)

## 2025-06-16 PROCEDURE — 99223 1ST HOSP IP/OBS HIGH 75: CPT | Mod: GC

## 2025-06-16 RX ORDER — HEPARIN SODIUM 1000 [USP'U]/ML
5000 INJECTION INTRAVENOUS; SUBCUTANEOUS EVERY 8 HOURS
Refills: 0 | Status: DISCONTINUED | OUTPATIENT
Start: 2025-06-16 | End: 2025-06-19

## 2025-06-16 RX ORDER — ACETAMINOPHEN 500 MG/5ML
650 LIQUID (ML) ORAL EVERY 6 HOURS
Refills: 0 | Status: DISCONTINUED | OUTPATIENT
Start: 2025-06-16 | End: 2025-06-16

## 2025-06-16 RX ORDER — CYCLOBENZAPRINE HYDROCHLORIDE 15 MG/1
10 CAPSULE, EXTENDED RELEASE ORAL THREE TIMES A DAY
Refills: 0 | Status: DISCONTINUED | OUTPATIENT
Start: 2025-06-16 | End: 2025-06-19

## 2025-06-16 RX ORDER — LOSARTAN POTASSIUM 100 MG/1
25 TABLET, FILM COATED ORAL DAILY
Refills: 0 | Status: DISCONTINUED | OUTPATIENT
Start: 2025-06-16 | End: 2025-06-19

## 2025-06-16 RX ORDER — DOLUTEGRAVIR SODIUM 5 MG/1
1 TABLET, FOR SUSPENSION ORAL
Refills: 0 | DISCHARGE

## 2025-06-16 RX ORDER — FINASTERIDE 1 MG/1
5 TABLET, FILM COATED ORAL AT BEDTIME
Refills: 0 | Status: DISCONTINUED | OUTPATIENT
Start: 2025-06-16 | End: 2025-06-19

## 2025-06-16 RX ORDER — OLMESARTAN MEDOXOMIL 5 MG/1
2 TABLET, FILM COATED ORAL
Refills: 0 | DISCHARGE

## 2025-06-16 RX ORDER — FINASTERIDE 1 MG/1
1 TABLET, FILM COATED ORAL
Refills: 0 | DISCHARGE

## 2025-06-16 RX ORDER — FERROUS SULFATE 137(45) MG
325 TABLET, EXTENDED RELEASE ORAL DAILY
Refills: 0 | Status: DISCONTINUED | OUTPATIENT
Start: 2025-06-16 | End: 2025-06-17

## 2025-06-16 RX ORDER — TAMSULOSIN HYDROCHLORIDE 0.4 MG/1
0.4 CAPSULE ORAL AT BEDTIME
Refills: 0 | Status: DISCONTINUED | OUTPATIENT
Start: 2025-06-16 | End: 2025-06-19

## 2025-06-16 RX ORDER — LIDOCAINE HYDROCHLORIDE 20 MG/ML
1 JELLY TOPICAL EVERY 24 HOURS
Refills: 0 | Status: DISCONTINUED | OUTPATIENT
Start: 2025-06-16 | End: 2025-06-19

## 2025-06-16 RX ORDER — ACETAMINOPHEN 500 MG/5ML
975 LIQUID (ML) ORAL EVERY 8 HOURS
Refills: 0 | Status: DISCONTINUED | OUTPATIENT
Start: 2025-06-16 | End: 2025-06-19

## 2025-06-16 RX ORDER — LIDOCAINE HYDROCHLORIDE 20 MG/ML
1 JELLY TOPICAL
Qty: 30 | Refills: 0
Start: 2025-06-16 | End: 2025-07-15

## 2025-06-16 RX ORDER — MELATONIN 5 MG
3 TABLET ORAL AT BEDTIME
Refills: 0 | Status: DISCONTINUED | OUTPATIENT
Start: 2025-06-16 | End: 2025-06-19

## 2025-06-16 RX ORDER — FERROUS SULFATE 137(45) MG
1 TABLET, EXTENDED RELEASE ORAL
Refills: 0 | DISCHARGE

## 2025-06-16 RX ORDER — DOLUTEGRAVIR SODIUM 5 MG/1
50 TABLET, FOR SUSPENSION ORAL DAILY
Refills: 0 | Status: DISCONTINUED | OUTPATIENT
Start: 2025-06-16 | End: 2025-06-19

## 2025-06-16 RX ORDER — CYCLOBENZAPRINE HYDROCHLORIDE 15 MG/1
1 CAPSULE, EXTENDED RELEASE ORAL
Refills: 0 | DISCHARGE

## 2025-06-16 RX ORDER — TAMSULOSIN HYDROCHLORIDE 0.4 MG/1
1 CAPSULE ORAL
Refills: 0 | DISCHARGE

## 2025-06-16 RX ADMIN — FINASTERIDE 5 MILLIGRAM(S): 1 TABLET, FILM COATED ORAL at 22:12

## 2025-06-16 RX ADMIN — TAMSULOSIN HYDROCHLORIDE 0.4 MILLIGRAM(S): 0.4 CAPSULE ORAL at 22:13

## 2025-06-16 RX ADMIN — Medication 325 MILLIGRAM(S): at 11:45

## 2025-06-16 RX ADMIN — Medication 1 TABLET(S): at 17:06

## 2025-06-16 RX ADMIN — DOLUTEGRAVIR SODIUM 50 MILLIGRAM(S): 5 TABLET, FOR SUSPENSION ORAL at 11:45

## 2025-06-16 RX ADMIN — CYCLOBENZAPRINE HYDROCHLORIDE 10 MILLIGRAM(S): 15 CAPSULE, EXTENDED RELEASE ORAL at 08:25

## 2025-06-16 RX ADMIN — Medication 650 MILLIGRAM(S): at 08:25

## 2025-06-16 RX ADMIN — HEPARIN SODIUM 5000 UNIT(S): 1000 INJECTION INTRAVENOUS; SUBCUTANEOUS at 13:14

## 2025-06-16 RX ADMIN — HEPARIN SODIUM 5000 UNIT(S): 1000 INJECTION INTRAVENOUS; SUBCUTANEOUS at 05:17

## 2025-06-16 RX ADMIN — LOSARTAN POTASSIUM 25 MILLIGRAM(S): 100 TABLET, FILM COATED ORAL at 08:25

## 2025-06-16 RX ADMIN — Medication 650 MILLIGRAM(S): at 09:40

## 2025-06-16 RX ADMIN — HEPARIN SODIUM 5000 UNIT(S): 1000 INJECTION INTRAVENOUS; SUBCUTANEOUS at 22:22

## 2025-06-16 NOTE — CONSULT NOTE ADULT - ASSESSMENT
I M     65 yo M with PMHx HIV, osteoarthritis, syphilis (treated, RPR 1:2), BPH, HTN, CKD, anemia, osteoarthritis, severe lumbar spondylosis s/p medial branch blocks, hip replacement (in 2007 for avascular necrosis, thought to be secondary to HIV and/or its meds)complicated by leg length descrepency, loosening of prosthetic hip, chronic pain and difficulty ambulating presenting with left hip pain and difficulty ambulating.     Problem/Plan - 1:  ·  Problem: Loosening of prosthetic hip.   ·  Plan: Chronic; Femur XR in 3/2024 revealed evidence of loosened implant. Per Dr. Diaz's (Ortho) OP note, the loose prosthesis has been impacting his function and ability to walk, he now requires assistive devices in addition to experiencing increasing pain, functional limitations and worsening limp. Patient was offered and elected for revision surgery. However, that has not yet occurred as he wanted to address his back issues first.. He is now presenting with progression of these symptoms. No periprosthetic fracture on XR/CT. Periprosthetic infection unlikely given negative CT findings, WBC and inflammatory markers being wnl.  Admitted to medicine for inability to ambulate,   - PT/OT   - LLE WBAT   - Ortho consulted, appreciate recs.    Problem/Plan - 2:  ·  Problem: HIV disease.   ·  Plan: Home meds: Tivicay 50 mg QD, Descovy 200-25 mg QD for PrEP, Doxy PEP, Valtrex 1 mg BID for HSV infection   Follows with Dr. Oliver (PCP). VL undetectable, CD[facility] absolute 518, [facility]   - C/w Tivicay 50 mg QD.    Problem/Plan - 3:  ·  Problem: Hypertension.   ·  Plan: - C/w home Olmesartan 10 mg QD.    Problem/Plan - 4:  ·  Problem: BPH (benign prostatic hyperplasia).   ·  Plan: - C/w home Flomax 0.4 mg QHS and Finasteride 5 mg QHS.    Problem/Plan - 5:  ·  Problem: CKD (chronic kidney disease).   ·  Plan: Cr on admission 1.46, BUN 19, eGFR 53.  Cr on outpatient labs 5/22/25 1.49 and 1.63 2/20/25.   C/w CKD stage 3A.   No evidence of IRAIS on CKD.  - Renally dose meds   - Avoid nephrotoxic meds  - Continue to f/u closely with PCP.    Problem/Plan - 6:  ·  Problem: Anemia.   ·  Plan: Known history of JACKSON and Vit B12 deficiency. Hb 12.5, at baseline.   Patient takes PO iron supplementation and B12 injections.   - C/w PO iron supplementation 325 mg QD   - C/e B12 injections outpatient.    Problem/Plan - 7:  ·  Problem: Insomnia.   ·  Plan: - C/w home Ambien 10 mg QHS.    Problem/Plan - 8:  ·  Problem: Lumbar spondylosis.   ·  Plan: Patient follows with PM&R for severe lumbar spondylosis, currently s/p medial branch blocks.  - C/w home Flexaril 10 mg TID PRN for muscle spasms  - Continue to follow closely with PM&R.    Problem/Plan - 9:  ·  Problem: Prophylactic measure.   ·  Plan: F: none   E: Replete as needed  N: Regular   DVT: Heparin SC   GI: Not indicated.

## 2025-06-16 NOTE — DISCHARGE NOTE PROVIDER - NSDCMRMEDTOKEN_GEN_ALL_CORE_FT
Ambien 10 mg oral tablet: 1 tab(s) orally once a day (at bedtime) as needed for  insomnia  ferrous sulfate 325 mg (65 mg elemental iron) oral tablet: 1 tab(s) orally once a day  finasteride 5 mg oral tablet: 1 tab(s) orally once a day (at bedtime)  Flexeril 10 mg oral tablet: 1 tab(s) orally 3 times a day as needed for  muscle spasm  Flomax 0.4 mg oral capsule: 1 cap(s) orally once a day (at bedtime)  olmesartan 5 mg oral tablet: 2 tab(s) orally once a day  Tivicay 50 mg oral tablet: 1 tab(s) orally once a day  Valtrex 1 g oral tablet: 1 tab(s) orally 2 times a day   Ambien 10 mg oral tablet: 1 tab(s) orally once a day (at bedtime) as needed for  insomnia  ferrous sulfate 325 mg (65 mg elemental iron) oral tablet: 1 tab(s) orally once a day  finasteride 5 mg oral tablet: 1 tab(s) orally once a day (at bedtime)  Flexeril 10 mg oral tablet: 1 tab(s) orally 3 times a day as needed for  muscle spasm  Flomax 0.4 mg oral capsule: 1 cap(s) orally once a day (at bedtime)  lidocaine 4% topical film: Apply topically to affected area once a day  olmesartan 5 mg oral tablet: 2 tab(s) orally once a day  Tivicay 50 mg oral tablet: 1 tab(s) orally once a day  Valtrex 1 g oral tablet: 1 tab(s) orally 2 times a day

## 2025-06-16 NOTE — H&P ADULT - PROBLEM SELECTOR PLAN 1
Chronic; Femur XR in 3/2024 revealed evidence of loosened implant. Per Dr. Diaz's (Ortho) OP note, the loose prosthesis has been impacting his function and ability to walk, he now requires assistive devices in addition to experiencing increasing pain, functional limitations and worsening limp. Patient was offered and elected for revision surgery. However, that has not yet occurred. He is now presenting with progression of these symptoms. No periprosthetic fracture on XR/CT. Periprosthetic infection unlikely given negative CT findings, WBC and inflammatory markers being wnl.  Admitted to medicine for inability to ambulate,   - PT/OT   - LLE WBAT   - Ortho consulted, appreciate recs Chronic; Femur XR in 3/2024 revealed evidence of loosened implant. Per Dr. Diaz's (Ortho) OP note, the loose prosthesis has been impacting his function and ability to walk, he now requires assistive devices in addition to experiencing increasing pain, functional limitations and worsening limp. Patient was offered and elected for revision surgery. However, that has not yet occurred as he wanted to address his back issues first.. He is now presenting with progression of these symptoms. No periprosthetic fracture on XR/CT. Periprosthetic infection unlikely given negative CT findings, WBC and inflammatory markers being wnl.  Admitted to medicine for inability to ambulate,   - PT/OT   - LLE WBAT   - Ortho consulted, appreciate recs

## 2025-06-16 NOTE — PHYSICAL THERAPY INITIAL EVALUATION ADULT - PERTINENT HX OF CURRENT PROBLEM, REHAB EVAL
No
65 yo M with PMHx HIV, syphilis (treated, RPR 1:2), BPH, HTN, CKD, anemia, osteoarthritis, severe lumbar spondylosis s/p medial branch blocks, hip replacement (in 2007 for avascular necrosis, thought to be secondary to HIV and/or its meds)complicated by leg length discrepancy, loosening of prosthetic hip, chronic pain and difficulty ambulating. Femur XR 3/2024 revealed evidence of loosened implant. Per Dr. Diaz's (Ortho) OP note, the loose prosthesis has been impacting his function and ability to walk, he now requires assistive devices in addition to experiencing increasing pain, functional limitations and worsening limp. Patient was offered and was interested in revision surgery. However, that has not yet occurred as he wanted to address his back issues first. He is now presenting with unremitting left hip pain. Last night he felt a pop in the hip followed by pain. He frequently experiences this, however the pain typically subsides after flexing the hip. Last night the pain did not subside, he was able to limp to bed, but then woke up this morning and was unable to ambulate due to the pain in his left hip. He then called EMS to bring him to the ED.

## 2025-06-16 NOTE — OCCUPATIONAL THERAPY INITIAL EVALUATION ADULT - MANUAL MUSCLE TESTING RESULTS, REHAB EVAL
no strength deficits were identified BUE/BLE >3+/5 based on functional mobility against gravity/no strength deficits were identified

## 2025-06-16 NOTE — PROGRESS NOTE ADULT - SUBJECTIVE AND OBJECTIVE BOX
***Note in progress***    OVERNIGHT EVENTS: NAEO    SUBJECTIVE / INTERVAL HPI: Patient seen and examined at bedside. Patient denying chest pain, SOB, palpitations, cough. Patient is lying down on his right side to avoid bearing weight on his left side. He states that there is pain on his left hip down to his left knee and rates it a 8/10. Pain is worse with movement. He reports having better range of motion compared to yesterday.    Remaining ROS negative       PHYSICAL EXAM:  General: NAD, appears comfortable    HEENT:  PERRL, anicteric sclera  Cardiovascular:  RRR, no W/R/R  Respiratory: CTA B/L, normal S1S2, no MRG  Gastrointestinal: soft, NT/ND; +BSx4  Extremities: Sensation intact UE and LE b/l, tenderness to palpation from the left hip down to the left knee, LLE is shorter than RLE, limited range of motion in left knee and hip due to pain  Vascular: 2+ radial pulses  Neurological: AAOx3; no focal deficits  Psychiatric: pleasant mood and affect  Dermatologic: no appreciable wounds or damage to the skin    VITAL SIGNS:  Vital Signs Last 24 Hrs  T(C): 36.7 (2025 08:24), Max: 36.7 (2025 05:40)  T(F): 98 (2025 08:24), Max: 98.1 (2025 05:40)  HR: 74 (2025 08:24) (59 - 74)  BP: 128/77 (2025 08:24) (114/70 - 142/89)  BP(mean): 107 (15 Jesus 2025 22:09) (107 - 107)  RR: 17 (2025 08:24) (16 - 18)  SpO2: 95% (2025 08:24) (93% - 99%)    Parameters below as of 2025 08:24  Patient On (Oxygen Delivery Method): room air          MEDICATIONS:  MEDICATIONS  (STANDING):  dolutegravir 50 milliGRAM(s) Oral daily  ferrous    sulfate 325 milliGRAM(s) Oral daily  finasteride 5 milliGRAM(s) Oral at bedtime  heparin   Injectable 5000 Unit(s) SubCutaneous every 8 hours  losartan 25 milliGRAM(s) Oral daily  tamsulosin 0.4 milliGRAM(s) Oral at bedtime    MEDICATIONS  (PRN):  acetaminophen     Tablet .. 650 milliGRAM(s) Oral every 6 hours PRN Temp greater or equal to 38C (100.4F), Mild Pain (1 - 3)  cyclobenzaprine 10 milliGRAM(s) Oral three times a day PRN Muscle Spasm  melatonin 3 milliGRAM(s) Oral at bedtime PRN Insomnia  zolpidem 10 milliGRAM(s) Oral at bedtime PRN Insomnia      ALLERGIES:  Allergies    No Known Allergies    Intolerances        LABS:                        12.5   8.20  )-----------( 179      ( 15 Jesus 2025 19:45 )             39.1     06-15    137  |  102  |  19  ----------------------------<  85  4.2   |  28  |  1.46[H]    Ca    9.4      15 Jesus 2025 19:45      PT/INR - ( 15 Jesus 2025 19:45 )   PT: 11.5 sec;   INR: 0.98          PTT - ( 15 Jesus 2025 19:45 )  PTT:28.4 sec  Urinalysis Basic - ( 2025 01:30 )    Color: Yellow / Appearance: Clear / S.017 / pH: x  Gluc: x / Ketone: x  / Bili: Negative / Urobili: 1.0 mg/dL   Blood: x / Protein: Negative mg/dL / Nitrite: Negative   Leuk Esterase: Negative / RBC: x / WBC x   Sq Epi: x / Non Sq Epi: x / Bacteria: x      CAPILLARY BLOOD GLUCOSE          RADIOLOGY & ADDITIONAL TESTS: Reviewed.

## 2025-06-16 NOTE — H&P ADULT - CONVERSATION DETAILS
Identified HCP as his sister Nahomi Newton [phone]. Pt states he has never thought about code status before and would like to think about this further. Currently he would like to be full code.

## 2025-06-16 NOTE — OCCUPATIONAL THERAPY INITIAL EVALUATION ADULT - GENERAL OBSERVATIONS, REHAB EVAL
Patient received supine in bed no lines present +heplock and NAD. Patient agreeable to work with OT. Cleared by POWER Samuel

## 2025-06-16 NOTE — H&P ADULT - NSHPLABSRESULTS_GEN_ALL_CORE
12.5   8.20  )-----------( 179      ( 15 Jesus 2025 19:45 )             39.1       -15    137  |  102  |  19  ----------------------------<  85  4.2   |  28  |  1.46[H]    Ca    9.4      15 Jesus 2025 19:45                Urinalysis Basic - ( 2025 01:30 )    Color: Yellow / Appearance: Clear / S.017 / pH: x  Gluc: x / Ketone: x  / Bili: Negative / Urobili: 1.0 mg/dL   Blood: x / Protein: Negative mg/dL / Nitrite: Negative   Leuk Esterase: Negative / RBC: x / WBC x   Sq Epi: x / Non Sq Epi: x / Bacteria: x        PT/INR - ( 15 Jesus 2025 19:45 )   PT: 11.5 sec;   INR: 0.98          PTT - ( 15 Jesus 2025 19:45 )  PTT:28.4 sec    Lactate Trend            CAPILLARY BLOOD GLUCOSE            Urinalysis with Rflx Culture (collected 25 @ 01:30)

## 2025-06-16 NOTE — DISCHARGE NOTE PROVIDER - CARE PROVIDER_API CALL
Brian Diaz  Orthopaedic Surgery  [address]  [place] [address]  Phone: [phone]  Fax: [phone]  Established Patient  Follow Up Time: 1 week

## 2025-06-16 NOTE — PATIENT PROFILE ADULT - FALL HARM RISK - HARM RISK INTERVENTIONS
Assistance with ambulation/Assistance OOB with selected safe patient handling equipment/Communicate Risk of Fall with Harm to all staff/Discuss with provider need for PT consult/Monitor gait and stability/Reinforce activity limits and safety measures with patient and family/Tailored Fall Risk Interventions/Visual Cue: Yellow wristband and red socks/Bed in lowest position, wheels locked, appropriate side rails in place/Call bell, personal items and telephone in reach/Instruct patient to call for assistance before getting out of bed or chair/Non-slip footwear when patient is out of bed/[place] to call system/Physically safe environment - no spills, clutter or unnecessary equipment/Purposeful Proactive Rounding/Room/bathroom lighting operational, light cord in reach

## 2025-06-16 NOTE — PROGRESS NOTE ADULT - PROBLEM SELECTOR PLAN 1
Chronic; Femur XR in 3/2024 revealed evidence of loosened implant. Per Dr. Diaz's (Ortho) OP note, the loose prosthesis has been impacting his function and ability to walk, he now requires assistive devices in addition to experiencing increasing pain, functional limitations and worsening limp. Patient was offered and elected for revision surgery. However, that has not yet occurred as he wanted to address his back issues first.. He is now presenting with progression of these symptoms. No periprosthetic fracture on XR/CT. Periprosthetic infection unlikely given negative CT findings, WBC and inflammatory markers being wnl.  Admitted to medicine for inability to ambulate.   - PT/OT   - LLE WBAT   - Ortho consulted, appreciate recs

## 2025-06-16 NOTE — H&P ADULT - PROBLEM SELECTOR PLAN 2
Prior VL undetectable based on chart review.  On Descovy for PrEP Home meds: Tivicay 50 mg QD, Descovy 200-25 mg QD for PrEP, Doxy PEP, Valtrex 1 mg BID for HSV infection   Follows with Dr. Oliver (PCP). VL undetectable, CD4 absolute 518, 43% on 5/22/2025 per MyChart.   - C/w Tivicay 50 mg QD

## 2025-06-16 NOTE — H&P ADULT - HISTORY OF PRESENT ILLNESS
Patient is a 63 yo M with PMHx HIV, osteoarthritis, Hip replacement (in 2007 for avascular necrosis, thought to be secondary to HIV and/or its meds)complicated by leg length descrepency, loosening of prosthetic hip, chronic pain and difficulty ambulating. Femur XR 3/2024 revealed evidence of loosened implant. Per Dr. Diaz's (Ortho) OP note, the loose prosthesis has been impacting his function and ability to walk, he now requires assistive devices in addition to experiencing increasing pain, functional limitations and worsening limp. Patient was offered and elected for revision surgery. However, that has not yet occurred. He is now presenting with progression of these symptoms. Last night he felt a pop in the hip followed by pain. He frequently experiences this, however the pain typically subsides after flexing the hip. Last night the pain did not subside, he was able to limp to bed, but then woke up this morning and was unable to ambulate due to the pain in his left hip. He then called EMS to bring him to the ED.     VS on admission: T 97.8, HR 68, /78, RR 16, SpO2 99 on RA   Labs s/f Cr 1.46, BUN 19, eGFR 53, ESR 3, CRP < 3, WBC 8.20  XR Lumbar Spine: L3-S1 degenerative disease with disc space narrowing and osteophytes.  XR L Femur/Hip: Lucency surrounding the femoral component consistent with component loosening, with a high riding appearance of the greater trochanter, and no identified fractures.   CT Pelvis: redemonstrating signs of L femoral component loosening without fracture.   Patient is a 65 yo M with PMHx HIV, syphilis (treated, RPR 1:2), osteoarthritis, severe lumbar spondylosis s/p medial branch blocks, hip replacement (in 2007 for avascular necrosis, thought to be secondary to HIV and/or its meds)complicated by leg length descrepency, loosening of prosthetic hip, chronic pain and difficulty ambulating. Femur XR 3/2024 revealed evidence of loosened implant. Per Dr. Diaz's (Ortho) OP note, the loose prosthesis has been impacting his function and ability to walk, he now requires assistive devices in addition to experiencing increasing pain, functional limitations and worsening limp. Patient was offered and was interested in revision surgery. However, that has not yet occurred as he wanted to address his back issues first. He is now presenting with unremitting left hip pain. Last night he felt a pop in the hip followed by pain. He frequently experiences this, however the pain typically subsides after flexing the hip. Last night the pain did not subside, he was able to limp to bed, but then woke up this morning and was unable to ambulate due to the pain in his left hip. He then called EMS to bring him to the ED.     VS on admission: T 97.8, HR 68, /78, RR 16, SpO2 99 on RA   Labs s/f Cr 1.46, BUN 19, eGFR 53, ESR 3, CRP < 3, WBC 8.20, Hb 12.5, MCV 90  XR Lumbar Spine: L3-S1 degenerative disease with disc space narrowing and osteophytes.  XR L Femur/Hip: Lucency surrounding the femoral component consistent with component loosening, with a high riding appearance of the greater trochanter, and no identified fractures.   CT Pelvis: redemonstrating signs of L femoral component loosening without fracture.   Patient is a 65 yo M with PMHx HIV, syphilis (treated, RPR 1:2), BPH, HTN, CKD, anemia, osteoarthritis, severe lumbar spondylosis s/p medial branch blocks, hip replacement (in 2007 for avascular necrosis, thought to be secondary to HIV and/or its meds)complicated by leg length descrepency, loosening of prosthetic hip, chronic pain and difficulty ambulating. Femur XR 3/2024 revealed evidence of loosened implant. Per Dr. Diaz's (Ortho) OP note, the loose prosthesis has been impacting his function and ability to walk, he now requires assistive devices in addition to experiencing increasing pain, functional limitations and worsening limp. Patient was offered and was interested in revision surgery. However, that has not yet occurred as he wanted to address his back issues first. He is now presenting with unremitting left hip pain. Last night he felt a pop in the hip followed by pain. He frequently experiences this, however the pain typically subsides after flexing the hip. Last night the pain did not subside, he was able to limp to bed, but then woke up this morning and was unable to ambulate due to the pain in his left hip. He then called EMS to bring him to the ED.     VS on admission: T 97.8, HR 68, /78, RR 16, SpO2 99 on RA   Labs s/f Cr 1.46, BUN 19, eGFR 53, ESR 3, CRP < 3, WBC 8.20, Hb 12.5, MCV 90  XR Lumbar Spine: L3-S1 degenerative disease with disc space narrowing and osteophytes.  XR L Femur/Hip: Lucency surrounding the femoral component consistent with component loosening, with a high riding appearance of the greater trochanter, and no identified fractures.   CT Pelvis: redemonstrating signs of L femoral component loosening without fracture.

## 2025-06-16 NOTE — H&P ADULT - PROBLEM SELECTOR PLAN 8
Patient follows with PM&R for severe lumbar spondylosis, currently s/p medial branch blocks,   - C/w home Flexaril 10 mg TID PRN for muscle spasms Patient follows with PM&R for severe lumbar spondylosis, currently s/p medial branch blocks.  - C/w home Flexaril 10 mg TID PRN for muscle spasms  - Continue to follow closely with PM&R

## 2025-06-16 NOTE — H&P ADULT - PROBLEM SELECTOR PLAN 5
F: none   E: Replete as needed  N: Regular   DVT: Heparin SC   GI: Not indicated Cr on admission 1.46, BUN 19, eGFR 53.  Cr on outpatient labs 5/22/25 1.49 and 1.63 2/20/25.   C/w CKD stage 3A.   No evidence of IRAIS on CKD.  - Renally dose meds   - Avoid nephrotoxic meds Cr on admission 1.46, BUN 19, eGFR 53.  Cr on outpatient labs 5/22/25 1.49 and 1.63 2/20/25.   C/w CKD stage 3A.   No evidence of IRAIS on CKD.  - Renally dose meds   - Avoid nephrotoxic meds  - Continue to f/u closely with PCP

## 2025-06-16 NOTE — DISCHARGE NOTE PROVIDER - ATTENDING DISCHARGE PHYSICAL EXAMINATION:
Physical exam:  GENERAL: NAD, lying in bed comfortably  HEAD:  Atraumatic, Normocephalic  EYES: EOMI, PERRLA, conjunctiva and sclera clear  ENT: Moist mucous membranes  NECK: Supple, No JVD  CHEST/LUNG: Clear to auscultation bilaterally; No rales, rhonchi, wheezing, or rubs.  HEART: Regular rate and rhythm; S1+ S2+   ABDOMEN: Bowel sounds present; Soft, Nontender, Nondistended   EXTREMITIES:  2+ Peripheral Pulses, brisk capillary refill. No clubbing, cyanosis, or edema  NERVOUS SYSTEM:  Alert & Oriented , speech clear   MSK: FROM all 4 extremities   SKIN: No rashes or lesions

## 2025-06-16 NOTE — H&P ADULT - PROBLEM SELECTOR PLAN 6
Known history of JACKSON and Vit B12 deficiency. Hb 12.5, at baseline.   Patient takes PO iron supplementation and B12 injections.   - C/w PO iron supplementation 325 mg QD   - C/e B12 injections outpatient

## 2025-06-16 NOTE — H&P ADULT - NSHPPHYSICALEXAM_GEN_ALL_CORE
VITAL SIGNS:  T(C): 36.5 (06-15-25 @ 22:09), Max: 36.6 (06-15-25 @ 16:27)  T(F): 97.7 (06-15-25 @ 22:09), Max: 97.8 (06-15-25 @ 16:27)  HR: 59 (06-15-25 @ 22:09) (59 - 68)  BP: 142/89 (06-15-25 @ 22:09) (126/78 - 142/89)  BP(mean): 107 (06-15-25 @ 22:09) (107 - 107)  RR: 17 (06-15-25 @ 22:09) (16 - 17)  SpO2: 95% (06-15-25 @ 22:09) (95% - 99%)  Wt(kg): --    PHYSICAL EXAM:    Constitutional: resting comfortably in bed; NAD  Head: NC/AT  Eyes: PERRL, EOMI, anicteric sclera  ENT: no nasal discharge; uvula midline, no oropharyngeal erythema or exudates; MMM  Neck: supple; no JVD or thyromegaly  Respiratory: CTA B/L; no W/R/R, no retractions  Cardiac: +S1/S2; RRR; no M/R/G; PMI non-displaced  Gastrointestinal: abdomen soft, NT/ND; no rebound or guarding; +BSx4  Back: spine midline, no bony tenderness or step-offs; no CVAT B/L  Extremities: WWP, no clubbing or cyanosis; no peripheral edema  Musculoskeletal: NROM x4; no joint swelling, tenderness or erythema  Vascular: 2+ radial, DP/PT pulses B/L  Dermatologic: skin warm, dry and intact; no rashes, wounds, or scars  Lymphatic: no submandibular or cervical LAD  Neurologic: AAOx3; CNII-XII grossly intact; no focal deficits  Psychiatric: affect and characteristics of appearance, verbalizations, behaviors are appropriate VITAL SIGNS:  T(C): 36.5 (06-15-25 @ 22:09), Max: 36.6 (06-15-25 @ 16:27)  T(F): 97.7 (06-15-25 @ 22:09), Max: 97.8 (06-15-25 @ 16:27)  HR: 59 (06-15-25 @ 22:09) (59 - 68)  BP: 142/89 (06-15-25 @ 22:09) (126/78 - 142/89)  BP(mean): 107 (06-15-25 @ 22:09) (107 - 107)  RR: 17 (06-15-25 @ 22:09) (16 - 17)  SpO2: 95% (06-15-25 @ 22:09) (95% - 99%)  Wt(kg): --    PHYSICAL EXAM:    Constitutional: resting comfortably in bed; NAD  Head: NC/AT  Eyes: PERRL, EOMI, anicteric sclera  ENT: no nasal discharge; uvula midline, no oropharyngeal erythema or exudates; MMM  Neck: supple  Respiratory: CTA B/L; no W/R/R  Cardiac: +S1/S2; RRR; no M/R/G  Gastrointestinal: abdomen soft, NT/ND; no rebound or guarding; +BSx4  Extremities: WWP, no clubbing or cyanosis; no peripheral edema  Vascular: 2+ radial, DP/PT pulses B/L  Neurologic: AAOx3; CNII-XII grossly intact; no focal deficits, unable to mobilize and assess LE strength, limited movement due to hip pain   Psychiatric: affect and characteristics of appearance, verbalizations, behaviors are appropriate

## 2025-06-16 NOTE — DISCHARGE NOTE PROVIDER - HOSPITAL COURSE
#Discharge: do not delete    Patient is a 63 yo M with PMHx HIV, osteoarthritis, syphilis (treated, RPR 1:2), BPH, HTN, CKD, anemia, osteoarthritis, severe lumbar spondylosis s/p medial branch blocks, hip replacement (in 2007 for avascular necrosis, thought to be secondary to HIV and/or its meds)complicated by leg length descrepency, loosening of prosthetic hip, chronic pain and difficulty ambulating presenting with left hip pain and difficulty ambulating.     Hospital course (by problem):   #Loosening of prosthetic hip.   Chronic; Femur XR in 3/2024 revealed evidence of loosened implant. Per Dr. Diaz's (Ortho) OP note, the loose prosthesis has been impacting his function and ability to walk, he now requires assistive devices in addition to experiencing increasing pain, functional limitations and worsening limp. Patient was offered and elected for revision surgery. However, that has not yet occurred as he wanted to address his back issues first.. He is now presenting with progression of these symptoms. No periprosthetic fracture on XR/CT. Periprosthetic infection unlikely given negative CT findings, WBC and inflammatory markers being wnl.  Admitted to medicine for inability to ambulate  - Ortho consulted, appreciate recs.   - PT/OT: no skilled PT needs,   - D/c with rolling walker and shower chair  - LLE WBAT     #HIV disease.   Home meds: Tivicay 50 mg QD, Descovy 200-25 mg QD for PrEP, Doxy PEP, Valtrex 1 mg BID for HSV infection   Follows with Dr. Oliver (PCP). VL undetectable, CD4 absolute 518, 43[facility]   - C/w home meds    #Hypertension.    C/w home Olmesartan 10 mg QD.    #BPH (benign prostatic hyperplasia).    C/w home Flomax 0.4 mg QHS and Finasteride 5 mg QHS.    #Stage 3 chronic kidney disease.   Cr on admission 1.46, BUN 19, eGFR 53.  Cr on outpatient labs 5/22/25 1.49 and 1.63 2/20/25.   C/w CKD stage 3A.   No evidence of IRAIS on CKD.  - Renally dose meds   - Avoid nephrotoxic meds  - Continue to f/u closely with PCP.    #Anemia.   Known history of JACKSON and Vit B12 deficiency. Hb 12.5, at baseline.   Patient takes PO iron supplementation and B12 injections.   - C/w PO iron supplementation 325 mg QD   - C/w B12 injections outpatient.    #Insomnia.    C/w home Ambien 10 mg QHS.    #Lumbar spondylosis.   Patient follows with PM&R for severe lumbar spondylosis, currently s/p medial branch blocks.  - C/w home Flexaril 10 mg TID PRN for muscle spasms  - Continue to follow closely with PM&R.    Patient was discharged to: home    New medications:   Changes to old medications:  Medications that were stopped:    Items to follow up as outpatient: Ortho    Physical exam at the time of discharge:  Constitutional: resting comfortably in bed; NAD  Head: NC/AT  Eyes: PERRL, EOMI, anicteric sclera  ENT: no nasal discharge; uvula midline, no oropharyngeal erythema or exudates; MMM  Neck: supple  Respiratory: CTA B/L; no W/R/R  Cardiac: +S1/S2; RRR; no M/R/G  Gastrointestinal: abdomen soft, NT/ND; no rebound or guarding; +BSx4  Extremities: WWP, no clubbing or cyanosis; no peripheral edema  Vascular: 2+ radial, DP/PT pulses B/L  Neurologic: AAOx3; CNII-XII grossly intact; no focal deficits, unable to mobilize and assess LE strength, limited movement due to hip pain   Psychiatric: affect and characteristics of appearance, verbalizations, behaviors are appropriate     #Discharge: do not delete    Patient is a 65 yo M with PMHx HIV, osteoarthritis, syphilis (treated, RPR 1:2), BPH, HTN, CKD, anemia, osteoarthritis, severe lumbar spondylosis s/p medial branch blocks, hip replacement (in 2007 for avascular necrosis, thought to be secondary to HIV and/or its meds)complicated by leg length discrepancy, loosening of prosthetic hip, chronic pain and difficulty ambulating presenting with left hip pain and difficulty ambulating.     Hospital course (by problem):   #Loosening of prosthetic hip.   Chronic; Femur XR in 3/2024 revealed evidence of loosened implant. Per Dr. Diaz's (Ortho) OP note, the loose prosthesis has been impacting his function and ability to walk, he now requires assistive devices in addition to experiencing increasing pain, functional limitations and worsening limp. Patient was offered and elected for revision surgery. However, that has not yet occurred as he wanted to address his back issues first.. He is now presenting with progression of these symptoms. No periprosthetic fracture on XR/CT. Periprosthetic infection unlikely given negative CT findings, WBC and inflammatory markers being wnl.  Admitted to medicine for inability to ambulate  - Ortho consulted, appreciate recs.   - PT/OT: no skilled PT needs,   - D/c with rolling walker and shower chair  - LLE WBAT     #HIV disease.   Home meds: Tivicay 50 mg QD, Descovy 200-25 mg QD for PrEP, Doxy PEP, Valtrex 1 mg BID for HSV infection   Follows with Dr. Oliver (PCP). VL undetectable, CD4 absolute 518, 43[facility]   - C/w home meds    #Hypertension.    C/w home Olmesartan 10 mg QD.    #BPH (benign prostatic hyperplasia).    C/w home Flomax 0.4 mg QHS and Finasteride 5 mg QHS.    #Stage 3 chronic kidney disease.   Cr on admission 1.46, BUN 19, eGFR 53.  Cr on outpatient labs 5/22/25 1.49 and 1.63 2/20/25.   C/w CKD stage 3A.   No evidence of IRAIS on CKD.  - Renally dose meds   - Avoid nephrotoxic meds  - Continue to f/u closely with PCP.    #Anemia.   Known history of JACKSON and Vit B12 deficiency. Hb 12.5, at baseline.   Patient takes PO iron supplementation and B12 injections.   - C/w PO iron supplementation 325 mg QD   - C/w B12 injections outpatient.    #Insomnia.    C/w home Ambien 10 mg QHS.    #Lumbar spondylosis.   Patient follows with PM&R for severe lumbar spondylosis, currently s/p medial branch blocks.  - C/w home Flexaril 10 mg TID PRN for muscle spasms  - Continue to follow closely with PM&R.    Patient was discharged to: home    New medications:   Changes to old medications:  Medications that were stopped:    Items to follow up as outpatient: Ortho    Physical exam at the time of discharge:  Constitutional: resting comfortably in bed; NAD  Head: NC/AT  Eyes: PERRL, EOMI, anicteric sclera  ENT: no nasal discharge; uvula midline, no oropharyngeal erythema or exudates; MMM  Neck: supple  Respiratory: CTA B/L; no W/R/R  Cardiac: +S1/S2; RRR; no M/R/G  Gastrointestinal: abdomen soft, NT/ND; no rebound or guarding; +BSx4  Extremities: WWP, no clubbing or cyanosis; no peripheral edema  Vascular: 2+ radial, DP/PT pulses B/L  Neurologic: AAOx3; CNII-XII grossly intact; no focal deficits, unable to mobilize and assess LE strength, limited movement due to hip pain   Psychiatric: affect and characteristics of appearance, verbalizations, behaviors are appropriate     #Discharge: do not delete    Patient is a 63 yo M with PMHx HIV, osteoarthritis, syphilis (treated, RPR 1:2), BPH, HTN, CKD, anemia, osteoarthritis, severe lumbar spondylosis s/p medial branch blocks, hip replacement (in 2007 for avascular necrosis, thought to be secondary to HIV and/or its meds)complicated by leg length discrepancy, loosening of prosthetic hip, chronic pain and difficulty ambulating presenting with left hip pain and difficulty ambulating.     Hospital course (by problem):   #Loosening of prosthetic hip.   Chronic; Femur XR in 3/2024 revealed evidence of loosened implant. Per Dr. Diaz's (Ortho) OP note, the loose prosthesis has been impacting his function and ability to walk, he now requires assistive devices in addition to experiencing increasing pain, functional limitations and worsening limp. Patient was offered and elected for revision surgery. However, that has not yet occurred as he wanted to address his back issues first.. He is now presenting with progression of these symptoms. No periprosthetic fracture on XR/CT. Periprosthetic infection unlikely given negative CT findings, WBC and inflammatory markers being wnl.  Admitted to medicine for inability to ambulate  - Ortho consulted, will pursue outpatient revision surgery. L knee hydrocortisone injection given this admisison  - PT/OT: no skilled PT needs,   - D/c with rolling walker and shower chair  - LLE WBAT     #HIV disease.   Home meds: Tivicay 50 mg QD, Descovy 200-25 mg QD for PrEP, Doxy PEP, Valtrex 1 mg BID for HSV infection   Follows with Dr. Oliver (PCP). VL undetectable, CD4 absolute 518, 43% on 5/22/2025 per MyChart.   - C/w home meds    #Hypertension.    C/w home Olmesartan 10 mg QD.    #BPH (benign prostatic hyperplasia).    C/w home Flomax 0.4 mg QHS and Finasteride 5 mg QHS.    #Stage 3 chronic kidney disease.   Cr on admission 1.46, BUN 19, eGFR 53.  Cr on outpatient labs 5/22/25 1.49 and 1.63 2/20/25.   C/w CKD stage 3A.   No evidence of IRAIS on CKD.  - Renally dose meds   - Avoid nephrotoxic meds  - Continue to f/u closely with PCP.    #Anemia.   Known history of JACKSON and Vit B12 deficiency. Hb 12.5, at baseline.   Patient takes PO iron supplementation and B12 injections.   - C/w PO iron supplementation 325 mg QD   - C/w B12 injections outpatient.    #Insomnia.    C/w home Ambien 10 mg QHS.    #Lumbar spondylosis.   Patient follows with PM&R for severe lumbar spondylosis, currently s/p medial branch blocks.  - C/w home Flexaril 10 mg TID PRN for muscle spasms  - Continue to follow closely with PM&R.    Patient was discharged to: home    New medications:   Changes to old medications:  Medications that were stopped:    Items to follow up as outpatient: Ortho    Physical exam at the time of discharge:  Constitutional: resting comfortably in bed; NAD  Head: NC/AT  Eyes: PERRL, EOMI, anicteric sclera  ENT: no nasal discharge; uvula midline, no oropharyngeal erythema or exudates; MMM  Neck: supple  Respiratory: CTA B/L; no W/R/R  Cardiac: +S1/S2; RRR; no M/R/G  Gastrointestinal: abdomen soft, NT/ND; no rebound or guarding; +BSx4  Extremities: WWP, no clubbing or cyanosis; no peripheral edema  Vascular: 2+ radial, DP/PT pulses B/L  Neurologic: AAOx3; CNII-XII grossly intact; no focal deficits, unable to mobilize and assess LE strength, limited movement due to hip pain   Psychiatric: affect and characteristics of appearance, verbalizations, behaviors are appropriate     #Discharge: do not delete    Patient is a 63 yo M with PMHx HIV, osteoarthritis, syphilis (treated, RPR 1:2), BPH, HTN, CKD, anemia, osteoarthritis, severe lumbar spondylosis s/p medial branch blocks, hip replacement (in 2007 for avascular necrosis, thought to be secondary to HIV and/or its meds)complicated by leg length discrepancy, loosening of prosthetic hip, chronic pain and difficulty ambulating presenting with left hip pain and difficulty ambulating.     Hospital course (by problem):   #Loosening of prosthetic hip.   Chronic; Femur XR in 3/2024 revealed evidence of loosened implant. Per Dr. Diaz's (Ortho) OP note, the loose prosthesis has been impacting his function and ability to walk, he now requires assistive devices in addition to experiencing increasing pain, functional limitations and worsening limp. Patient was offered and elected for revision surgery. However, that has not yet occurred as he wanted to address his back issues first.. He is now presenting with progression of these symptoms. No periprosthetic fracture on XR/CT. Periprosthetic infection unlikely given negative CT findings, WBC and inflammatory markers being wnl.  Admitted to medicine for inability to ambulate  - Ortho consulted, will pursue outpatient revision surgery. L knee hydrocortisone injection given this admisison  - PT/OT: no skilled PT needs,   - D/c with rolling walker and shower chair  - LLE WBAT     #HIV disease.   Home meds: Tivicay 50 mg QD, Descovy 200-25 mg QD for PrEP, Doxy PEP, Valtrex 1 mg BID for HSV infection   Follows with Dr. Oliver (PCP). VL undetectable, CD4 absolute 518, 43% on 5/22/2025 per MyChart.   - C/w home meds    #Hypertension.    C/w home Olmesartan 10 mg QD.    #BPH (benign prostatic hyperplasia).    C/w home Flomax 0.4 mg QHS and Finasteride 5 mg QHS.    #Stage 3 chronic kidney disease.   Cr on admission 1.46, BUN 19, eGFR 53.  Cr on outpatient labs 5/22/25 1.49 and 1.63 2/20/25.   C/w CKD stage 3A.   No evidence of IRAIS on CKD.  - Renally dose meds   - Avoid nephrotoxic meds  - Continue to f/u closely with PCP.    #Anemia.   Known history of JACKSON and Vit B12 deficiency. Hb 12.5, at baseline.   Patient takes PO iron supplementation and B12 injections.   - C/w PO iron supplementation 325 mg QD   - C/w B12 injections outpatient.    #Insomnia.    C/w home Ambien 10 mg QHS.    #Lumbar spondylosis.   Patient follows with PM&R for severe lumbar spondylosis, currently s/p medial branch blocks.  - C/w home Flexaril 10 mg TID PRN for muscle spasms  - Continue to follow closely with PM&R.    Patient was discharged to: home    New medications: none  Changes to old medications: none  Medications that were stopped: none    Items to follow up as outpatient: Ortho    Physical exam at the time of discharge:  Constitutional: resting comfortably in bed; NAD  Head: NC/AT  Eyes: PERRL, EOMI, anicteric sclera  ENT: no nasal discharge; uvula midline, no oropharyngeal erythema or exudates; MMM  Neck: supple  Respiratory: CTA B/L; no W/R/R  Cardiac: +S1/S2; RRR; no M/R/G  Gastrointestinal: abdomen soft, NT/ND; no rebound or guarding; +BSx4  Extremities: WWP, no clubbing or cyanosis; no peripheral edema  Vascular: 2+ radial, DP/PT pulses B/L  Neurologic: AAOx3; CNII-XII grossly intact; no focal deficits, unable to mobilize and assess LE strength, limited movement due to hip pain   Psychiatric: affect and characteristics of appearance, verbalizations, behaviors are appropriate

## 2025-06-16 NOTE — OCCUPATIONAL THERAPY INITIAL EVALUATION ADULT - PLANNED THERAPY INTERVENTIONS, OT EVAL
ADL retraining/balance training/neuromuscular re-education/parent/caregiver training.../ROM/strengthening

## 2025-06-16 NOTE — PROGRESS NOTE ADULT - PROBLEM SELECTOR PLAN 2
Home meds: Tivicay 50 mg QD, Descovy 200-25 mg QD for PrEP, Doxy PEP, Valtrex 1 mg BID for HSV infection   Follows with Dr. Oliver (PCP). VL undetectable, CD4 absolute 518, 43% on 5/22/2025 per MyChart.   - C/w Tivicay 50 mg QD

## 2025-06-16 NOTE — PHYSICAL THERAPY INITIAL EVALUATION ADULT - GENERAL OBSERVATIONS, REHAB EVAL
Pt received in bed in no acute distress, +heplock. States he is able to move his LLE better, but still has pan.

## 2025-06-16 NOTE — CHART NOTE - NSCHARTNOTEFT_GEN_A_CORE
Patient will require a rolling walker at home due to their diagnosis of severe lumbar spondylosis, debility & Loosening of prosthetic hip (M47.816, R54 & T84.038A) to complete the MRADL's.

## 2025-06-16 NOTE — OCCUPATIONAL THERAPY INITIAL EVALUATION ADULT - DIAGNOSIS, OT EVAL
Stress test 12/14/17 LVEF 70%, WNL  Continue current dose    Pt presented with complaints of left hip pain limiting ability to ambulate. Patient found to have loosening of prosthetic hip and admitted for further workup and management. Patient with limited LLE ROM and increased pain with mobility limiting overall performance. Pt presented with complaints of left hip pain limiting ability to ambulate. Patient found to have loosening of prosthetic hip and admitted for further workup and management. Patient with limited LLE ROM and increased pain with mobility limiting overall ADL performance. Pt admitted for L hip pain, found to have loosening of prosthetic hip and admitted for further workup and management. Patient presents at functional baseline, able to perform all ADLs and mobility with modified independence using RW. No further acute OT needs. Pt will be d/c from OT at this time.

## 2025-06-16 NOTE — H&P ADULT - ASSESSMENT
Patient is a 63 yo M with PMHx HIV, osteoarthritis, Hip replacement (in 2007 for avascular necrosis, thought to be secondary to HIV and/or its meds)complicated by leg length descrepency, loosening of prosthetic hip, chronic pain and difficulty ambulating presenting with left hip pain and difficulty ambulating.  Patient is a 63 yo M with PMHx HIV, osteoarthritis, syphilis (treated, RPR 1:2), BPH, HTN, CKD, anemia, osteoarthritis, severe lumbar spondylosis s/p medial branch blocks, hip replacement (in 2007 for avascular necrosis, thought to be secondary to HIV and/or its meds)complicated by leg length descrepency, loosening of prosthetic hip, chronic pain and difficulty ambulating presenting with left hip pain and difficulty ambulating.

## 2025-06-16 NOTE — DISCHARGE NOTE PROVIDER - NSDCCPCAREPLAN_GEN_ALL_CORE_FT
PRINCIPAL DISCHARGE DIAGNOSIS  Diagnosis: Loosening of prosthetic hip  Assessment and Plan of Treatment: You presented to the hospital after you had significant difficulty with walking. You had an x ray and CT scan of your left hips revealing left femoral loosening of your hip. The orthopedic team evaluated you and recommended surgery as an inpatient or outpatient basis. Given you planned surgeries over the next 2 months, it was decided that you would receive hip surgery after your other surgeries are completed. Physical therapy evaluated you and recommend using a rolling walker and shower chair. For pain control, please take the following medications ***. Please follow up with your orthopedic outpatient. In addition, on your CT scan you were found to have an incidental cyst on your left kidney. Please follow up with your PCP for a renal ultrasound. The CT report is included in your paperwork.     PRINCIPAL DISCHARGE DIAGNOSIS  Diagnosis: Loosening of prosthetic hip  Assessment and Plan of Treatment: You presented to the hospital after you had significant difficulty with walking. You had an x ray and CT scan of your left hips revealing left femoral loosening of your hip. The orthopedic team evaluated you and recommended surgery as an inpatient or outpatient basis. Given you planned surgeries over the next 2 months, it was decided that you would receive hip surgery after your other surgeries are completed. Physical therapy evaluated you and recommend using a rolling walker and shower chair. Please follow up with your orthopedic Dr. Diaz outpatient. In addition, on your CT scan you were found to have an incidental cyst on your left kidney. Please follow up with your PCP for a renal ultrasound. The CT report is included in your paperwork.     PRINCIPAL DISCHARGE DIAGNOSIS  Diagnosis: Loosening of prosthetic hip  Assessment and Plan of Treatment: You presented to the hospital after you had significant difficulty with walking. You had an x ray and CT scan of your left hips revealing left femoral loosening of your hip. The orthopedic team evaluated you and recommended revision surgery as an outpatient.  Physical therapy evaluated you and recommend using a rolling walker and shower chair. Please follow up with your orthopedic Dr. Diaz outpatient. In addition, on your CT scan you were found to have an incidental cyst on your left kidney. Please follow up with your PCP for a renal ultrasound. The CT report is included in your paperwork.

## 2025-06-16 NOTE — OCCUPATIONAL THERAPY INITIAL EVALUATION ADULT - MODIFIED CLINICAL TEST OF SENSORY INTEGRATION IN BALANCE TEST
Patient able to perform functional transfers (sit to/from stand, toilet) with Mod I requiring additional time and RW for safety. Patient able to perform functional ambulation within unit utilizing RW and Supervision for additional time and safety - patient with noted narrow RUBI and decreased step length secondary to left hip pain with mobility as well as limb discrepancy Patient able to perform functional ambulation within unit utilizing RW and Supervision for additional time and safety - patient with noted narrow RUBI and decreased step length secondary to left hip pain with mobility as well as limb discrepancy Patient able to perform functional ambulation within unit utilizing RW and modified independence for additional time and safety

## 2025-06-16 NOTE — DISCHARGE NOTE PROVIDER - NSDCCPTREATMENT_GEN_ALL_CORE_FT
PRINCIPAL PROCEDURE  Procedure: CT pelvis  Findings and Treatment: IMPRESSION:  1.  Left hip arthroplasty, with fatuma-hardware lucency along the femoral   stem, which may be due to loosening and/or infection. Advise comparison   with prior postoperative imaging if available. No acute fracture.  3. Severe prostatomegaly. Urinary bladder wall thickening, may be due to   cystitis or obstructive uropathy.  4. Partially visualized 7 cm hypodense lesion in the left   retroperitoneum, may represent renal cyst or other cystic lesion. Advise  renal ultrasound for evaluation.  RECOMMENDATION: Renal ultrasound.

## 2025-06-16 NOTE — OCCUPATIONAL THERAPY INITIAL EVALUATION ADULT - ASR WT BEARING STATUS EVAL
per Ortho WBAT LLE/no weight-bearing restrictions per Ortho ASHLI Fry WBAT LLE/no weight-bearing restrictions

## 2025-06-16 NOTE — H&P ADULT - NSVTERISKREFERASSESS_GEN_ALL_CORE
"Chief Complaint   Patient presents with     Hemorrhoids       Initial /68 (BP Location: Left arm, Patient Position: Chair, Cuff Size: Adult Regular)  Pulse 68  Temp 98  F (36.7  C) (Oral)  Resp 16  Ht 5' 1.75\" (1.568 m)  Wt 136 lb (61.7 kg)  BMI 25.08 kg/m2 Estimated body mass index is 25.08 kg/(m^2) as calculated from the following:    Height as of this encounter: 5' 1.75\" (1.568 m).    Weight as of this encounter: 136 lb (61.7 kg).  Medication Reconciliation: complete    " Refer to the Assessment tab to view/cancel completed assessment.

## 2025-06-16 NOTE — CONSULT NOTE ADULT - SUBJECTIVE AND OBJECTIVE BOX
Orthopaedic Surgery Consult Note    For Surgeon: Dr. Diaz    HPI: 64M with HTN BPH HIV s/p Left FRITZ at [facility] in 2007 presenting with Left hip pain and difficulty ambulating since last night when he felt a pop in the hip. No recent falls or trauma to hip. Pt reports frequently feeling this sensation with pain but it typically subsides after flexing up the hip. Pt reports that last night the pain did not subside and he had significant left hip pain with ambulation. He was able to limp to bed, woke up this morning and was able to stand initially, but but was unable to walk due to Left hip pain and called ems to bring him to ED. Pt has been seen by Dr. Diaz in the past and has made plans to have a revision surgery of the left hip, however, he is been planning to address his lumbar spine pain prior to undergoing additional hip surgery.  Pt reports that the shortening of his LLE is chronic and is being addressed with heel lifts in his shoes. He had been using a cane to ambulate at baseline, but with the heel lifts has been able to ambulate unassisted.       Vital Signs Last 24 Hrs  T(C): 36.5 (15 Jesus 2025 22:09), Max: 36.6 (15 Jesus 2025 16:27)  T(F): 97.7 (15 Jesus 2025 22:09), Max: 97.8 (15 Jesus 2025 16:27)  HR: 59 (15 Jesus 2025 22:09) (59 - 68)  BP: 142/89 (15 Jesus 2025 22:09) (126/78 - 142/89)  BP(mean): 107 (15 Jesus 2025 22:09) (107 - 107)  RR: 17 (15 Jeuss 2025 22:09) (16 - 17)  SpO2: 95% (15 Jesus 2025 22:09) (95% - 99%)    Parameters below as of 15 Jesus 2025 22:09  Patient On (Oxygen Delivery Method): room air      Physical Exam:  CONSTITUTIONAL:  Well appearing, well nourished, awake, alert, oriented to person, place, time/situation and in no apparent distress.  HENMT:  Head is atraumatic normocephalic. Mucous membranes are moist.  No angioedema and no stridor. Normal voice.  Tolerating secretions.   RESPIRATORY: No respiratory distress.  Non-tachypneic and non-labored.  No accessory muscle use.  Speaking full sentences.  MUSCULOSKELETAL: LLE is roughtly 3cm shortened compared to RLE.   SILT SPN/DPN/Saph/Sushil/Tib  Motor 5/5 TA/GS/EHL/FHL. +firing Q/HS, unable to Straight leg raise LLE  Pulses 2+bilateral DP  Mild TTP over posterior hip  passive hip flexion 0-90 deg, stable    Imaging: XR L Femur/Hip showing Lucency surrounding the femoral component consistent with component loosening, with a high riding appearance of the greater trochanter, and no identified fractures.   CT Pelvis redemonstrating signs of L femoral component loosening without fracture.  XR Lumbar spine shows L3-S1 degenerative disease with disc space narrowing and osteophytes.    Labs: ESR 3; CRP <3.0; WBC 8.20    A/P: 64yMale with L FRITZ complicated by hardware loosening  - No periprosthetic fracture appreciated on XR/CT imaging  - less likely periprosthetic infection given no elevation of inflammatory markers or large effusion on CT  - May trial ambulation, likely will need admission for PT evaluation if unable to ambulate.  - WBAT LLE  - pain control  - Discussed with Chief resident  - Final plan pending discussion with Dr. Diaz
  Patient is a 64y old  Male who presents with a chief complaint of Difficulty ambulating (2025 01:16)      HPI:  Patient is a 63 yo M with PMHx HIV, syphilis (treated, RPR 1:2), BPH, HTN, CKD, anemia, osteoarthritis, severe lumbar spondylosis s/p medial branch blocks, hip replacement (in  for avascular necrosis, thought to be secondary to HIV and/or its meds)complicated by leg length descrepency, loosening of prosthetic hip, chronic pain and difficulty ambulating. Femur XR 3/2024 revealed evidence of loosened implant. Per Dr. Diaz's (Ortho) OP note, the loose prosthesis has been impacting his function and ability to walk, he now requires assistive devices in addition to experiencing increasing pain, functional limitations and worsening limp. Patient was offered and was interested in revision surgery. However, that has not yet occurred as he wanted to address his back issues first. He is now presenting with unremitting left hip pain. Last night he felt a pop in the hip followed by pain. He frequently experiences this, however the pain typically subsides after flexing the hip. Last night the pain did not subside, he was able to limp to bed, but then woke up this morning and was unable to ambulate due to the pain in his left hip. He then called EMS to bring him to the ED.     VS on admission: T 97.8, HR 68, /78, RR 16, SpO2 99 on RA   Labs s/f Cr 1.46, BUN 19, eGFR 53, ESR 3, CRP < 3, WBC 8.20, Hb 12.5, MCV 90  XR Lumbar Spine: L3-S1 degenerative disease with disc space narrowing and osteophytes.  XR L Femur/Hip: Lucency surrounding the femoral component consistent with component loosening, with a high riding appearance of the greater trochanter, and no identified fractures.   CT Pelvis: redemonstrating signs of L femoral component loosening without fracture.   (2025 01:16)    PAST MEDICAL & SURGICAL HISTORY:    MEDICATIONS  (STANDING):  dolutegravir 50 milliGRAM(s) Oral daily  ferrous    sulfate 325 milliGRAM(s) Oral daily  finasteride 5 milliGRAM(s) Oral at bedtime  heparin   Injectable 5000 Unit(s) SubCutaneous every 8 hours  losartan 25 milliGRAM(s) Oral daily  tamsulosin 0.4 milliGRAM(s) Oral at bedtime    MEDICATIONS  (PRN):  acetaminophen     Tablet .. 650 milliGRAM(s) Oral every 6 hours PRN Temp greater or equal to 38C (100.4F), Mild Pain (1 - 3)  cyclobenzaprine 10 milliGRAM(s) Oral three times a day PRN Muscle Spasm  melatonin 3 milliGRAM(s) Oral at bedtime PRN Insomnia  zolpidem 10 milliGRAM(s) Oral at bedtime PRN Insomnia      FAMILY HISTORY:      CBC Full  -  ( 15 Jesus 2025 19:45 )  WBC Count : 8.20 K/uL  RBC Count : 4.03 M/uL  Hemoglobin : 12.5 g/dL  Hematocrit : 39.1 %  Platelet Count - Automated : 179 K/uL  Mean Cell Volume : 97.0 fl  Mean Cell Hemoglobin : 31.0 pg  Mean Cell Hemoglobin Concentration : 32.0 g/dL  Auto Neutrophil # : x  Auto Lymphocyte # : x  Auto Monocyte # : x  Auto Eosinophil # : x  Auto Basophil # : x  Auto Neutrophil % : x  Auto Lymphocyte % : x  Auto Monocyte % : x  Auto Eosinophil % : x  Auto Basophil % : x      -15    137  |  102  |  19  ----------------------------<  85  4.2   |  28  |  1.46[H]    Ca    9.4      15 Jesus 2025 19:45        Urinalysis Basic - ( 2025 01:30 )    Color: Yellow / Appearance: Clear / S.017 / pH: x  Gluc: x / Ketone: x  / Bili: Negative / Urobili: 1.0 mg/dL   Blood: x / Protein: Negative mg/dL / Nitrite: Negative   Leuk Esterase: Negative / RBC: x / WBC x   Sq Epi: x / Non Sq Epi: x / Bacteria: x        Radiology :     ACC: [id number] EXAM:  CT PELVIS ONLY   ORDERED BY: HAO KRAMER     PROCEDURE DATE:  06/15/2025    ******PRELIMINARY REPORT******      ******PRELIMINARY REPORT******           INTERPRETATION:  [facility] RADIOLOGIST PRELIMINARY ADDENDUM REPORT    Addendum created by Raj Tyler MD on 6/15/2025 11:45:25 PM EDT:  Partially visualized 7 cm hypodense lesion in the left retroperitoneum,   may  represent renal cyst or other cystic lesion, can be evaluated on dedicated  imaging.    Initial report created on 6/15/2025 11:44:08 PM EDT:    PROCEDURE INFORMATION:  Exam: CT Pelvis Without Contrast  Exam date and time: 6/15/2025 8:52 PM  Age: 64 years old  Clinical indication: R/O hip fracture    TECHNIQUE:  Imaging protocol: Computed tomography of the pelvis without contrast.  3D rendering (Not supervised by radiologist): MIP and/or 3D reconstructed  images were created by the technologist.    COMPARISON:  CR XR HIP WITH PELVIS 2 OR 3 VIEWS LEFT 6/15/2025 5:49 PM    FINDINGS:  Intestine: Visualized small and large intestine are unremarkable.  Appendix: No evidence of appendicitis.  Intraperitoneal space: Unremarkable. No free air. No significant fluid  collection.  Lymph nodes:Unremarkable. No enlarged lymph nodes.  Reproductive: Prostatomegaly.  Urinary bladder: Mild urinary bladder wall thickening may be due to   cystitis or  obstructive uropathy.  Bones/joints: Degenerate changes in the visualized lumbar vertebrae.   Sacrum in  sacroiliac joints intact. Pelvic bones intact. Hip joints intact. Left hip  arthroplasty , with perihardware lucency along the femoral stem, can be   due to  loosening and/or infection. Hardware components otherwise intact. No acute  fracture. Visualized knee joints show mild-to-moderate degenerate changes.  Soft tissues: Mild subcutaneous fat stranding. No large hematoma seen..    IMPRESSION:  1.   No acute fracture seen.  2.   Left hip arthroplasty , with perihardware lucency along the femoral   stem,  can be due to loosening and/or infection. Correlate clinically .  3.   Moderate-to-severe prostatomegaly.  4.   Urinary bladder wall thickening may be due to cystitis or obstructive  uropathy.            Review of Systems : per HPI         Vital Signs Last 24 Hrs  T(C): 36.7 (2025 08:24), Max: 36.7 (2025 05:40)  T(F): 98 (2025 08:24), Max: 98.1 (2025 05:40)  HR: 74 (2025 08:24) (59 - 74)  BP: 128/77 (2025 08:24) (114/70 - 142/89)  BP(mean): 107 (15 Jesus 2025 22:09) (107 - 107)  RR: 17 (2025 08:24) (16 - 18)  SpO2: 95% (2025 08:24) (93% - 99%)    Parameters below as of 2025 08:24  Patient On (Oxygen Delivery Method): room air            Physical Exam:   64 y o man lying comfortably in semi Adan's position , awake , alert , no acute complaints      Head: normocephalic , atraumatic    Eyes: PERRLA , EOMI , no nystagmus , sclera anicteric    ENT / FACE: neg nasal discharge , uvula midline , no oropharyngeal erythema / exudate    Neck: supple , negative JVD , negative carotid bruits , no thyromegaly    Chest: CTA bilaterally     Cardiovascular: regular rate and rhythm , neg murmurs / rubs / gallops    Abdomen: soft , non distended , no tenderness to palpation in all 4 quadrants ,  normal bowel sounds     Extremities: WWP , neg cyanosis /clubbing / edema      Musculoskeletal:  pain with L hip flexion , ++ LLD R>L     Neurologic Exam:     Alert and oriented to person , place , date/year , speech fluent w/o dysarthria      Cranial Nerves:           II:                         pupils equal , round and reactive to light , visual fields intact         III/ IV/VI:             extraocular movements intact , neg nystagmus , neg ptosis        V:                        facial sensation intact , V1-3 normal        VII:                      face symmetric , no droop , normal eye closure and smile        VIII:                     hearing intact to finger rub bilaterally        IX and X:             no hoarseness , gag intact , palate/ uvula rise symmetrically        XI:                       SCM / trapezius strength intact bilateral        XII:                      no tongue deviation    Motor Exam:        > 4/5 x 3 extremities ,  LLE prox pain limited distally > 4/5      Sensation:         intact to light touch x 4 extremities                               DTR:           biceps/brachioradialis: equal                       Gait:  not tested         PM&R Impression: admitted for chronic pain and difficulty ambulating presenting with left hip pain     - h/o hip replacement (in  for avascular necrosis, thought to be secondary to HIV and/or its meds)complicated by leg length descrepency, loosening of prosthetic hip          Recommendations / Plan:       1) Physical / Occupational therapy focusing on therapeutic exercises , equipment evaluation , bed mobility/transfer out of bed evaluation , progressive ambulation with assistive devices prn .    2) Current disposition plan recommendation:    probable EMILY

## 2025-06-16 NOTE — OCCUPATIONAL THERAPY INITIAL EVALUATION ADULT - ADDITIONAL COMMENTS
Patient lives alone in elevator building. Patient independent with all ADLs and IADLs at baseline. Patient reports using bilateral crutches and SC for mobility. Patient with tub/shower unit and standard toilet.

## 2025-06-16 NOTE — H&P ADULT - ATTENDING COMMENTS
Pt. seen and examined by me earlier today; I have read Dr. Levy's H&P, I agree w/ her findings and plan of care as documented; imaging reviewed; cont. pain control, PT WBAT, f/u Ortho recs

## 2025-06-16 NOTE — PHYSICAL THERAPY INITIAL EVALUATION ADULT - ADDITIONAL COMMENTS
Pt resides in elevator, has cane, no other DME, uses lift in left shoe when ambulating outdoors but does not use it indoors.

## 2025-06-16 NOTE — OCCUPATIONAL THERAPY INITIAL EVALUATION ADULT - PERTINENT HX OF CURRENT PROBLEM, REHAB EVAL
Patient is a 65 yo M with PMHx HIV, osteoarthritis, syphilis (treated, RPR 1:2), BPH, HTN, CKD, anemia, osteoarthritis, severe lumbar spondylosis s/p medial branch blocks, hip replacement (in 2007 for avascular necrosis, thought to be secondary to HIV and/or its meds)complicated by leg length discrepancy, loosening of prosthetic hip, chronic pain and difficulty ambulating presenting with left hip pain and difficulty ambulating. Patient admitted for further workup and management. Patient stable and referred for skilled OT functional evaluation and discharge recommendations.

## 2025-06-16 NOTE — H&P ADULT - TIME BILLING
coordination of care; preparing to see Pt.; interviewing Pt.; examining Pt.; reviewing labs and images; documentation in [place]; d/w Medicine resident on rounds

## 2025-06-17 ENCOUNTER — TRANSCRIPTION ENCOUNTER (OUTPATIENT)
Age: 65
End: 2025-06-17

## 2025-06-17 LAB
ANION GAP SERPL CALC-SCNC: 10 MMOL/L — SIGNIFICANT CHANGE UP (ref 5–17)
BUN SERPL-MCNC: 18 MG/DL — SIGNIFICANT CHANGE UP (ref 7–23)
CALCIUM SERPL-MCNC: 9.1 MG/DL — SIGNIFICANT CHANGE UP (ref 8.4–10.5)
CHLORIDE SERPL-SCNC: 104 MMOL/L — SIGNIFICANT CHANGE UP (ref 96–108)
CO2 SERPL-SCNC: 25 MMOL/L — SIGNIFICANT CHANGE UP (ref 22–31)
CREAT SERPL-MCNC: 1.49 MG/DL — HIGH (ref 0.5–1.3)
EGFR: 52 ML/MIN/1.73M2 — LOW
EGFR: 52 ML/MIN/1.73M2 — LOW
GLUCOSE SERPL-MCNC: 92 MG/DL — SIGNIFICANT CHANGE UP (ref 70–99)
HCT VFR BLD CALC: 39.1 % — SIGNIFICANT CHANGE UP (ref 39–50)
HGB BLD-MCNC: 12.7 G/DL — LOW (ref 13–17)
MAGNESIUM SERPL-MCNC: 1.7 MG/DL — SIGNIFICANT CHANGE UP (ref 1.6–2.6)
MCHC RBC-ENTMCNC: 31 PG — SIGNIFICANT CHANGE UP (ref 27–34)
MCHC RBC-ENTMCNC: 32.5 G/DL — SIGNIFICANT CHANGE UP (ref 32–36)
MCV RBC AUTO: 95.4 FL — SIGNIFICANT CHANGE UP (ref 80–100)
NRBC BLD AUTO-RTO: 0 /100 WBCS — SIGNIFICANT CHANGE UP (ref 0–0)
PHOSPHATE SERPL-MCNC: 3.2 MG/DL — SIGNIFICANT CHANGE UP (ref 2.5–4.5)
PLATELET # BLD AUTO: 166 K/UL — SIGNIFICANT CHANGE UP (ref 150–400)
POTASSIUM SERPL-MCNC: 4 MMOL/L — SIGNIFICANT CHANGE UP (ref 3.5–5.3)
POTASSIUM SERPL-SCNC: 4 MMOL/L — SIGNIFICANT CHANGE UP (ref 3.5–5.3)
RBC # BLD: 4.1 M/UL — LOW (ref 4.2–5.8)
RBC # FLD: 12.2 % — SIGNIFICANT CHANGE UP (ref 10.3–14.5)
SODIUM SERPL-SCNC: 139 MMOL/L — SIGNIFICANT CHANGE UP (ref 135–145)
WBC # BLD: 6.17 K/UL — SIGNIFICANT CHANGE UP (ref 3.8–10.5)
WBC # FLD AUTO: 6.17 K/UL — SIGNIFICANT CHANGE UP (ref 3.8–10.5)

## 2025-06-17 PROCEDURE — 99233 SBSQ HOSP IP/OBS HIGH 50: CPT | Mod: GC

## 2025-06-17 PROCEDURE — 20610 DRAIN/INJ JOINT/BURSA W/O US: CPT | Mod: LT

## 2025-06-17 RX ORDER — FERROUS SULFATE 137(45) MG
325 TABLET, EXTENDED RELEASE ORAL EVERY 24 HOURS
Refills: 0 | Status: DISCONTINUED | OUTPATIENT
Start: 2025-06-17 | End: 2025-06-19

## 2025-06-17 RX ORDER — TRIAMCINOLONE ACETONIDE 200 MG/5ML
40 INJECTION, SUSPENSION INTRA-ARTICULAR; INTRAMUSCULAR ONCE
Refills: 0 | Status: COMPLETED | OUTPATIENT
Start: 2025-06-17 | End: 2025-06-17

## 2025-06-17 RX ORDER — LIDOCAINE HCL/PF 10 MG/ML
5 VIAL (ML) INJECTION ONCE
Refills: 0 | Status: DISCONTINUED | OUTPATIENT
Start: 2025-06-17 | End: 2025-06-19

## 2025-06-17 RX ADMIN — Medication 325 MILLIGRAM(S): at 14:18

## 2025-06-17 RX ADMIN — LOSARTAN POTASSIUM 25 MILLIGRAM(S): 100 TABLET, FILM COATED ORAL at 06:40

## 2025-06-17 RX ADMIN — TAMSULOSIN HYDROCHLORIDE 0.4 MILLIGRAM(S): 0.4 CAPSULE ORAL at 21:12

## 2025-06-17 RX ADMIN — TRIAMCINOLONE ACETONIDE 40 MILLIGRAM(S): 200 INJECTION, SUSPENSION INTRA-ARTICULAR; INTRAMUSCULAR at 16:50

## 2025-06-17 RX ADMIN — Medication 1 TABLET(S): at 11:53

## 2025-06-17 RX ADMIN — FINASTERIDE 5 MILLIGRAM(S): 1 TABLET, FILM COATED ORAL at 21:12

## 2025-06-17 RX ADMIN — HEPARIN SODIUM 5000 UNIT(S): 1000 INJECTION INTRAVENOUS; SUBCUTANEOUS at 11:54

## 2025-06-17 RX ADMIN — HEPARIN SODIUM 5000 UNIT(S): 1000 INJECTION INTRAVENOUS; SUBCUTANEOUS at 21:12

## 2025-06-17 RX ADMIN — DOLUTEGRAVIR SODIUM 50 MILLIGRAM(S): 5 TABLET, FOR SUSPENSION ORAL at 11:53

## 2025-06-17 NOTE — PROGRESS NOTE ADULT - PROBLEM SELECTOR PLAN 2
Home meds: Tivicay 50 mg QD, Descovy 200-25 mg QD for PrEP, Doxy PEP, Valtrex 1 mg BID for HSV infection   Follows with Dr. Oliver (PCP). VL undetectable, CD4 absolute 518, 43% on 5/22/2025 per MyChart.   - C/w Tivicay 50 mg QD Home meds: Tivicay 50 mg QD, Descovy 200-25 mg QD for PrEP, Doxy PEP, Valtrex 1 mg BID for HSV infection   Follows with Dr. Oliver (PCP). VL undetectable, CD4 absolute 518, 43% on 5/22/2025 per MyChart.   - C/w  home meds

## 2025-06-17 NOTE — PROGRESS NOTE ADULT - PROBLEM SELECTOR PLAN 1
Chronic; Femur XR in 3/2024 revealed evidence of loosened implant. Per Dr. Diaz's (Ortho) OP note, the loose prosthesis has been impacting his function and ability to walk, he now requires assistive devices in addition to experiencing increasing pain, functional limitations and worsening limp. Patient was offered and elected for revision surgery. However, that has not yet occurred as he wanted to address his back issues first.. He is now presenting with progression of these symptoms. No periprosthetic fracture on XR/CT. Periprosthetic infection unlikely given negative CT findings, WBC and inflammatory markers being wnl.  Admitted to medicine for inability to ambulate,   - PT/OT   - LLE WBAT   - Ortho consulted, appreciate recs Chronic; Femur XR in 3/2024 revealed evidence of loosened implant. Per Dr. Diaz's (Ortho) OP note, the loose prosthesis has been impacting his function and ability to walk, he now requires assistive devices in addition to experiencing increasing pain, functional limitations and worsening limp. Patient was offered and elected for revision surgery. However, that has not yet occurred as he wanted to address his back issues first.. He is now presenting with progression of these symptoms. No periprosthetic fracture on XR/CT. Periprosthetic infection unlikely given negative CT findings, WBC and inflammatory markers being wnl.  Admitted to medicine for inability to ambulate,   - PT/OT -> no needs, rec walker and shower chair  - LLE WBAT   - Ortho consulted, no inpatient surgical intervention and will follow up outpatient. Possible steroid injectin L knee today

## 2025-06-17 NOTE — PROGRESS NOTE ADULT - SUBJECTIVE AND OBJECTIVE BOX
**INCOMPLETE NOTE    OVERNIGHT EVENTS:    SUBJECTIVE:  Patient seen and examined at bedside.    Vital Signs Last 12 Hrs  T(F): 97.9 (06-17-25 @ 06:30), Max: 97.9 (06-17-25 @ 06:30)  HR: 70 (06-17-25 @ 06:30) (70 - 70)  BP: 116/70 (06-17-25 @ 06:30) (116/70 - 116/70)  BP(mean): --  RR: 18 (06-17-25 @ 06:30) (18 - 18)  SpO2: 95% (06-17-25 @ 06:30) (95% - 95%)  I&O's Summary      PHYSICAL EXAM:  Constitutional: NAD, comfortable in bed.  HEENT: NC/AT, PERRLA, EOMI, no conjunctival pallor or scleral icterus, MMM  Neck: Supple, no JVD  Respiratory: CTA B/L. No w/r/r.   Cardiovascular: RRR, normal S1 and S2, no m/r/g.   Gastrointestinal: +BS, soft NTND, no guarding or rebound tenderness, no palpable masses   Extremities: wwp; no cyanosis, clubbing or edema.   Vascular: Pulses equal and strong throughout.   Neurological: AAOx3, no CN deficits, strength and sensation intact throughout.   Skin: No gross skin abnormalities or rashes        LABS:                        12.7   6.17  )-----------( 166      ( 17 Jun 2025 05:30 )             39.1     06-17    139  |  104  |  18  ----------------------------<  92  4.0   |  25  |  1.49[H]    Ca    9.1      17 Jun 2025 05:30  Phos  3.2     06-17  Mg     1.7     06-17      PT/INR - ( 15 Jesus 2025 19:45 )   PT: 11.5 sec;   INR: 0.98          PTT - ( 15 Jesus 2025 19:45 )  PTT:28.4 sec  Urinalysis Basic - ( 17 Jun 2025 05:30 )    Color: x / Appearance: x / SG: x / pH: x  Gluc: 92 mg/dL / Ketone: x  / Bili: x / Urobili: x   Blood: x / Protein: x / Nitrite: x   Leuk Esterase: x / RBC: x / WBC x   Sq Epi: x / Non Sq Epi: x / Bacteria: x          RADIOLOGY & ADDITIONAL TESTS:    MEDICATIONS  (STANDING):  dolutegravir 50 milliGRAM(s) Oral daily  emtricitabine 200 mG/tenofovir alafenamide 25 mG (DESCOVY) Tablet 1 Tablet(s) Oral daily  ferrous    sulfate 325 milliGRAM(s) Oral daily  finasteride 5 milliGRAM(s) Oral at bedtime  heparin   Injectable 5000 Unit(s) SubCutaneous every 8 hours  lidocaine   4% Patch 1 Patch Transdermal every 24 hours  losartan 25 milliGRAM(s) Oral daily  tamsulosin 0.4 milliGRAM(s) Oral at bedtime    MEDICATIONS  (PRN):  acetaminophen     Tablet .. 975 milliGRAM(s) Oral every 8 hours PRN Temp greater or equal to 38C (100.4F), Mild Pain (1 - 3)  cyclobenzaprine 10 milliGRAM(s) Oral three times a day PRN Muscle Spasm  melatonin 3 milliGRAM(s) Oral at bedtime PRN Insomnia  zolpidem 10 milliGRAM(s) Oral at bedtime PRN Insomnia     OVERNIGHT EVENTS: FLORA    SUBJECTIVE:  Patient seen and examined at bedside. Pt frustrated that he did not receive hydrocortisone injection yesterday. He states his hip pain is improved from yesterday and has been able to ambulate with the walker. He denies any LE numbness, bowel/bladder incontinence.     Vital Signs Last 12 Hrs  T(F): 97.9 (06-17-25 @ 06:30), Max: 97.9 (06-17-25 @ 06:30)  HR: 70 (06-17-25 @ 06:30) (70 - 70)  BP: 116/70 (06-17-25 @ 06:30) (116/70 - 116/70)  BP(mean): --  RR: 18 (06-17-25 @ 06:30) (18 - 18)  SpO2: 95% (06-17-25 @ 06:30) (95% - 95%)  I&O's Summary      PHYSICAL EXAM:  Constitutional: resting comfortably in bed; NAD  Respiratory: CTA B/L; no W/R/R  Cardiac: +S1/S2; RRR; no M/R/G  Gastrointestinal: abdomen soft, NT/ND; no rebound or guarding; +BSx4  Extremities: WWP, no clubbing or cyanosis; no peripheral edema  Vascular: 2+ radial, DP/PT pulses B/L  Neurologic: AAOx3, LLE strength 3/5, RLE strength 5/5  Psychiatric: affect and characteristics of appearance, verbalizations, behaviors are appropriate        LABS:                        12.7   6.17  )-----------( 166      ( 17 Jun 2025 05:30 )             39.1     06-17    139  |  104  |  18  ----------------------------<  92  4.0   |  25  |  1.49[H]    Ca    9.1      17 Jun 2025 05:30  Phos  3.2     06-17  Mg     1.7     06-17      PT/INR - ( 15 Jesus 2025 19:45 )   PT: 11.5 sec;   INR: 0.98          PTT - ( 15 Jesus 2025 19:45 )  PTT:28.4 sec  Urinalysis Basic - ( 17 Jun 2025 05:30 )    Color: x / Appearance: x / SG: x / pH: x  Gluc: 92 mg/dL / Ketone: x  / Bili: x / Urobili: x   Blood: x / Protein: x / Nitrite: x   Leuk Esterase: x / RBC: x / WBC x   Sq Epi: x / Non Sq Epi: x / Bacteria: x          RADIOLOGY & ADDITIONAL TESTS:    MEDICATIONS  (STANDING):  dolutegravir 50 milliGRAM(s) Oral daily  emtricitabine 200 mG/tenofovir alafenamide 25 mG (DESCOVY) Tablet 1 Tablet(s) Oral daily  ferrous    sulfate 325 milliGRAM(s) Oral daily  finasteride 5 milliGRAM(s) Oral at bedtime  heparin   Injectable 5000 Unit(s) SubCutaneous every 8 hours  lidocaine   4% Patch 1 Patch Transdermal every 24 hours  losartan 25 milliGRAM(s) Oral daily  tamsulosin 0.4 milliGRAM(s) Oral at bedtime    MEDICATIONS  (PRN):  acetaminophen     Tablet .. 975 milliGRAM(s) Oral every 8 hours PRN Temp greater or equal to 38C (100.4F), Mild Pain (1 - 3)  cyclobenzaprine 10 milliGRAM(s) Oral three times a day PRN Muscle Spasm  melatonin 3 milliGRAM(s) Oral at bedtime PRN Insomnia  zolpidem 10 milliGRAM(s) Oral at bedtime PRN Insomnia

## 2025-06-17 NOTE — PROGRESS NOTE ADULT - SUBJECTIVE AND OBJECTIVE BOX
SUBJECTIVE: Pt seen and examined on morning rounds. Pt reports he has improved LLE pain compared to presentation in ED, would like to pursue revision FRITZ surgery. Pt denies cp/sob/n/v/ha    Vital Signs Last 24 Hrs  T(C): 36.6 (17 Jun 2025 06:30), Max: 36.9 (16 Jun 2025 14:13)  T(F): 97.9 (17 Jun 2025 06:30), Max: 98.4 (16 Jun 2025 14:13)  HR: 70 (17 Jun 2025 06:30) (70 - 74)  BP: 116/70 (17 Jun 2025 06:30) (116/70 - 128/77)  BP(mean): --  RR: 18 (17 Jun 2025 06:30) (17 - 18)  SpO2: 95% (17 Jun 2025 06:30) (95% - 99%)    Parameters below as of 17 Jun 2025 06:30  Patient On (Oxygen Delivery Method): room air        Physical Exam:  General: NAD, resting comfortably in bed  LLE:  SILT SPN/DPN/Saph/Sushil/Tib  Motor 5/5 HS/TA/GS/EHL/FHL, 4/5 Q limited by pain.  Pulses 2+ dp    RLE:  SILT SPN/DPN/Saph/Sushil/Tib  Motor 5/5 Q/HS/TA/GS/EHL/FHL  Pulses 2+ dp      Assessment/Plan:  64yM with L FRITZ place in 2007 now complicated by hardware loosening  - No periprosthetic fracture appreciated on XR/CT imaging  - Less likely infection given no inflammatory marker elevation.  - c/w PT daily  - WBAT LLE  - pain control  - possible L knee cortisone injection today

## 2025-06-17 NOTE — PROGRESS NOTE ADULT - SUBJECTIVE AND OBJECTIVE BOX
Physical Medicine and Rehabilitation Progress Note :       Patient is a 64y old  Male who presents with a chief complaint of Difficulty ambulating (17 Jun 2025 08:39)      HPI:  Patient is a 65 yo M with PMHx HIV, syphilis (treated, RPR 1:2), BPH, HTN, CKD, anemia, osteoarthritis, severe lumbar spondylosis s/p medial branch blocks, hip replacement (in 2007 for avascular necrosis, thought to be secondary to HIV and/or its meds)complicated by leg length descrepency, loosening of prosthetic hip, chronic pain and difficulty ambulating. Femur XR 3/2024 revealed evidence of loosened implant. Per Dr. Diaz's (Ortho) OP note, the loose prosthesis has been impacting his function and ability to walk, he now requires assistive devices in addition to experiencing increasing pain, functional limitations and worsening limp. Patient was offered and was interested in revision surgery. However, that has not yet occurred as he wanted to address his back issues first. He is now presenting with unremitting left hip pain. Last night he felt a pop in the hip followed by pain. He frequently experiences this, however the pain typically subsides after flexing the hip. Last night the pain did not subside, he was able to limp to bed, but then woke up this morning and was unable to ambulate due to the pain in his left hip. He then called EMS to bring him to the ED.     VS on admission: T 97.8, HR 68, /78, RR 16, SpO2 99 on RA   Labs s/f Cr 1.46, BUN 19, eGFR 53, ESR 3, CRP < 3, WBC 8.20, Hb 12.5, MCV 90  XR Lumbar Spine: L3-S1 degenerative disease with disc space narrowing and osteophytes.  XR L Femur/Hip: Lucency surrounding the femoral component consistent with component loosening, with a high riding appearance of the greater trochanter, and no identified fractures.   CT Pelvis: redemonstrating signs of L femoral component loosening without fracture.   (16 Jun 2025 01:16)                            12.7   6.17  )-----------( 166      ( 17 Jun 2025 05:30 )             39.1       06-17    139  |  104  |  18  ----------------------------<  92  4.0   |  25  |  1.49[H]    Ca    9.1      17 Jun 2025 05:30  Phos  3.2     06-17  Mg     1.7     06-17      Vital Signs Last 24 Hrs  T(C): 36.8 (17 Jun 2025 12:35), Max: 36.9 (16 Jun 2025 14:13)  T(F): 98.3 (17 Jun 2025 12:35), Max: 98.4 (16 Jun 2025 14:13)  HR: 62 (17 Jun 2025 12:35) (62 - 74)  BP: 117/64 (17 Jun 2025 12:35) (116/70 - 125/72)  BP(mean): --  RR: 18 (17 Jun 2025 12:35) (18 - 18)  SpO2: 94% (17 Jun 2025 12:35) (94% - 99%)    Parameters below as of 17 Jun 2025 12:35  Patient On (Oxygen Delivery Method): room air        MEDICATIONS  (STANDING):  dolutegravir 50 milliGRAM(s) Oral daily  emtricitabine 200 mG/tenofovir alafenamide 25 mG (DESCOVY) Tablet 1 Tablet(s) Oral daily  ferrous    sulfate 325 milliGRAM(s) Oral every 24 hours  finasteride 5 milliGRAM(s) Oral at bedtime  heparin   Injectable 5000 Unit(s) SubCutaneous every 8 hours  lidocaine   4% Patch 1 Patch Transdermal every 24 hours  losartan 25 milliGRAM(s) Oral daily  tamsulosin 0.4 milliGRAM(s) Oral at bedtime    MEDICATIONS  (PRN):  acetaminophen     Tablet .. 975 milliGRAM(s) Oral every 8 hours PRN Temp greater or equal to 38C (100.4F), Mild Pain (1 - 3)  cyclobenzaprine 10 milliGRAM(s) Oral three times a day PRN Muscle Spasm  melatonin 3 milliGRAM(s) Oral at bedtime PRN Insomnia  zolpidem 10 milliGRAM(s) Oral at bedtime PRN Insomnia      T(C): 36.8 (06-17-25 @ 12:35), Max: 36.9 (06-16-25 @ 14:13)  HR: 62 (06-17-25 @ 12:35) (62 - 74)  BP: 117/64 (06-17-25 @ 12:35) (116/70 - 125/72)  RR: 18 (06-17-25 @ 12:35) (18 - 18)  SpO2: 94% (06-17-25 @ 12:35) (94% - 99%)    Physical Exam:   64 y o man lying comfortably in semi Adan's position , awake , alert , no acute complaints      Head: normocephalic , atraumatic    Eyes: PERRLA , EOMI , no nystagmus , sclera anicteric    ENT / FACE: neg nasal discharge , uvula midline , no oropharyngeal erythema / exudate    Neck: supple , negative JVD , negative carotid bruits , no thyromegaly    Chest: CTA bilaterally     Cardiovascular: regular rate and rhythm , neg murmurs / rubs / gallops    Abdomen: soft , non distended , no tenderness to palpation in all 4 quadrants ,  normal bowel sounds     Extremities: WWP , neg cyanosis /clubbing / edema      Musculoskeletal:  pain with L hip flexion , ++ LLD R>L     Neurologic Exam:     Alert and oriented to person , place , date/year , speech fluent w/o dysarthria      Cranial Nerves:           II:                         pupils equal , round and reactive to light , visual fields intact         III/ IV/VI:             extraocular movements intact , neg nystagmus , neg ptosis        V:                        facial sensation intact , V1-3 normal        VII:                      face symmetric , no droop , normal eye closure and smile        VIII:                     hearing intact to finger rub bilaterally        IX and X:             no hoarseness , gag intact , palate/ uvula rise symmetrically        XI:                       SCM / trapezius strength intact bilateral        XII:                      no tongue deviation    Motor Exam:        > 4/5 x 3 extremities ,  LLE prox pain limited distally > 4/5      Sensation:         intact to light touch x 4 extremities                               DTR:           biceps/brachioradialis: equal                     Initial Functional Status Assessment :       Previous Level of Function:     · Ambulation Skills  independent; needs device  · Transfer Skills  independent; needs device  · ADL Skills  independent  · Work/Leisure Activity  independent  · Additional Comments  Pt resides in elevator, has cane, no other DME, uses lift in left shoe when ambulating outdoors but does not use it indoors.    Cognitive Status Examination:   · Orientation  oriented to person, place, time and situation  · Level of Consciousness  alert    Range of Motion Exam:   · Range of Motion Examination  bilateral upper extremity ROM was WFL (within functional limits); bilateral lower extremity ROM was WFL (within functional limits)    Manual Muscle Testing:   · Manual Muscle Testing Results  BUE & BLE 4+/5 except L hip 3-/5 throughout    Bed Mobility: Rolling/Turning:     · Level of [place]  independent    Bed Mobility: Scooting/Bridging:     · Level of [place]  independent    Bed Mobility: Sit to Supine:     · Level of [place]  independent  · Physical Assist/Nonphysical Assist  supervision    Bed Mobility: Supine to Sit:     · Level of [place]  independent  · Physical Assist/Nonphysical Assist  supervision    Bed Mobility Analysis:     · Bed Mobility Limitations  decreased ability to use legs for bridging/pushing; impaired ability to control trunk for mobility  · Impairments Contributing to Impaired Bed Mobility  decreased strength    Transfer: Sit to Stand:     · Level of [place]  independent  · Physical Assist/Nonphysical Assist  supervision; verbal cues  · Weight-Bearing Restrictions  weight-bearing as tolerated  · Assistive Device  rolling walker    Transfer: Stand to Sit:     · Level of [place]  independent  · Physical Assist/Nonphysical Assist  supervision; verbal cues  · Weight-Bearing Restrictions  weight-bearing as tolerated  · Assistive Device  rolling walker    Sit/Stand Transfer Safety Analysis:     · Transfer Safety Concerns Noted  decreased weight-shifting ability  · Impairments Contributing to Impaired Transfers  impaired balance; pain; decreased strength    Gait Skills:     · Level of [place]  independent  · Weight-Bearing Restrictions  weight-bearing as tolerated  · Assistive Device  rolling walker  · Gait Distance  50 feet    Gait Analysis:     · Gait Pattern Used  swing-through gait  · Gait Deviations Noted  decreased ruiz  · Impairments Contributing to Gait Deviations  impaired balance; pain; decreased strength    Balance Skills Assessment:     · Sitting Balance: Static  normal balance  · Sitting Balance: Dynamic  normal balance  · Sit-to-Stand Balance  good balance  · Standing Balance: Static  good balance  · Standing Balance: Dynamic  good minus  · Systems Impairment Contributing to Balance Disturbance  musculoskeletal  · Identified Impairments Contributing to Balance Disturbance  pain; decreased strength    Sensory Examination:   Sensory Examination:    Grossly Intact:   · Gross Sensory Examination  Grossly Intact; Left LE; Right LE    Balance Skills Assessment:     · Sitting Balance: Static  good balance  · Sitting Balance: Dynamic  good balance  · Sit-to-Stand Balance  good balance  · Standing Balance: Static  good balance  · Standing Balance: Dynamic  fair balance  · Systems Impairment Contributing to Balance Disturbance  musculoskeletal  · Identified Impairments Contributing to Balance Disturbance  pain; decreased ROM; decreased strength    Sensory Examination:     Grossly Intact:   · Gross Sensory Examination  Grossly Intact    · Balance Skills  Patient able to perform functional ambulation within unit utilizing  and modified [place] for additional time and safety      Fine Motor Coordination:     Grossly Intact:   · Grossly Intact  Left UE; Right UE      Lower Body Dressing Training:     · Level of [place]  supervision; while seated EOB to emmy bilateral socks  · Physical Assist/Nonphysical Assist  verbal cues; set-up required    Toilet Hygiene Training:     · Level of [place]  independent; using urinal at bedside    Eating/Self-Feeding Training:     · Level of [place]  independent    Clinical Impression:             · Occupational Therapy DME Recommendations  rolling walker; bathing  · Bathing Equipment  shower chair      PM&R Impression : as above    Current disposition plan recommendation :    d/c home with no PT/OT needs

## 2025-06-18 PROCEDURE — 99233 SBSQ HOSP IP/OBS HIGH 50: CPT | Mod: GC

## 2025-06-18 RX ORDER — BACITRACIN 500 UNIT/G
1 OINTMENT (GRAM) TOPICAL EVERY 24 HOURS
Refills: 0 | Status: DISCONTINUED | OUTPATIENT
Start: 2025-06-18 | End: 2025-06-19

## 2025-06-18 RX ADMIN — HEPARIN SODIUM 5000 UNIT(S): 1000 INJECTION INTRAVENOUS; SUBCUTANEOUS at 13:10

## 2025-06-18 RX ADMIN — Medication 1 TABLET(S): at 13:11

## 2025-06-18 RX ADMIN — HEPARIN SODIUM 5000 UNIT(S): 1000 INJECTION INTRAVENOUS; SUBCUTANEOUS at 21:17

## 2025-06-18 RX ADMIN — LOSARTAN POTASSIUM 25 MILLIGRAM(S): 100 TABLET, FILM COATED ORAL at 06:19

## 2025-06-18 RX ADMIN — DOLUTEGRAVIR SODIUM 50 MILLIGRAM(S): 5 TABLET, FOR SUSPENSION ORAL at 13:11

## 2025-06-18 RX ADMIN — HEPARIN SODIUM 5000 UNIT(S): 1000 INJECTION INTRAVENOUS; SUBCUTANEOUS at 06:19

## 2025-06-18 RX ADMIN — Medication 325 MILLIGRAM(S): at 13:11

## 2025-06-18 RX ADMIN — Medication 1 APPLICATION(S): at 17:55

## 2025-06-18 RX ADMIN — TAMSULOSIN HYDROCHLORIDE 0.4 MILLIGRAM(S): 0.4 CAPSULE ORAL at 21:17

## 2025-06-18 RX ADMIN — FINASTERIDE 5 MILLIGRAM(S): 1 TABLET, FILM COATED ORAL at 21:17

## 2025-06-18 NOTE — PROGRESS NOTE ADULT - PROBLEM SELECTOR PLAN 1
Chronic; Femur XR in 3/2024 revealed evidence of loosened implant. Per Dr. Diaz's (Ortho) OP note, the loose prosthesis has been impacting his function and ability to walk, he now requires assistive devices in addition to experiencing increasing pain, functional limitations and worsening limp. Patient was offered and elected for revision surgery. However, that has not yet occurred as he wanted to address his back issues first.. He is now presenting with progression of these symptoms. No periprosthetic fracture on XR/CT. Periprosthetic infection unlikely given negative CT findings, WBC and inflammatory markers being wnl.  Admitted to medicine for inability to ambulate,   - PT/OT -> no needs, rec walker and shower chair  - LLE WBAT   - Ortho consulted, no inpatient surgical intervention and will follow up outpatient. Possible steroid injectin L knee today Chronic; Femur XR in 3/2024 revealed evidence of loosened implant. Per Dr. Diaz's (Ortho) OP note, the loose prosthesis has been impacting his function and ability to walk, he now requires assistive devices in addition to experiencing increasing pain, functional limitations and worsening limp. Patient was offered and elected for revision surgery. However, that has not yet occurred as he wanted to address his back issues first.. He is now presenting with progression of these symptoms. No periprosthetic fracture on XR/CT. Periprosthetic infection unlikely given negative CT findings, WBC and inflammatory markers being wnl.  Admitted to medicine for inability to ambulate,   - PT/OT -> no needs, rec walker and shower chair  - LLE WBAT   - Ortho consulted, no inpatient surgical intervention and will follow up outpatient. Received steroid injection to L knee today

## 2025-06-18 NOTE — PROGRESS NOTE ADULT - ATTENDING COMMENTS
will fu with ortho outpt for revision   medically ready for dc , ambulatory status improved after Steroid injection  PT rec: no skilled PT needs
pending Ortho final recs  will be medically ready for dc after Steroid injection  PT rec: no skilled PT needs

## 2025-06-18 NOTE — PROGRESS NOTE ADULT - SUBJECTIVE AND OBJECTIVE BOX
SUBJECTIVE: Pt seen and examined on morning rounds. Pt denies cp/sob/n/v/ha    Vital Signs Last 24 Hrs  T(C): 36.7 (18 Jun 2025 06:10), Max: 36.8 (17 Jun 2025 12:35)  T(F): 98 (18 Jun 2025 06:10), Max: 98.3 (17 Jun 2025 12:35)  HR: 60 (18 Jun 2025 06:10) (60 - 70)  BP: 117/74 (18 Jun 2025 06:10) (116/70 - 128/75)  BP(mean): --  RR: 18 (18 Jun 2025 06:10) (18 - 18)  SpO2: 95% (18 Jun 2025 06:10) (94% - 95%)    Parameters below as of 18 Jun 2025 06:10  Patient On (Oxygen Delivery Method): room air        Physical Exam:  General: NAD, resting comfortably in bed  LLE:  SILT SPN/DPN/Saph/Sushil/Tib  Motor 5/5 Q/HS/TA/GS/EHL/FHL, able to straight leg raise  Pulses 2+ dp    RLE:  SILT SPN/DPN/Saph/Sushil/Tib  Motor 5/5 Q/HS/TA/GS/EHL/FHL  Pulses 2+ dp      Assessment/Plan:  64yM with L FRITZ place in 2007 now complicated by hardware loosening  - WBAT LLE  - pain control  - L knee cortisone injection 6/17  - DVT PPx per primary team  - continue to ambulate as tolerated with assistive device  - schedule outpatient revision FRITZ with Dr. Diaz

## 2025-06-18 NOTE — PROGRESS NOTE ADULT - PROBLEM SELECTOR PLAN 2
Home meds: Tivicay 50 mg QD, Descovy 200-25 mg QD for PrEP, Doxy PEP, Valtrex 1 mg BID for HSV infection   Follows with Dr. Oliver (PCP). VL undetectable, CD4 absolute 518, 43% on 5/22/2025 per MyChart.   - C/w  home meds

## 2025-06-18 NOTE — PROGRESS NOTE ADULT - SUBJECTIVE AND OBJECTIVE BOX
**INCOMPLETE NOTE    OVERNIGHT EVENTS:    SUBJECTIVE:  Patient seen and examined at bedside.    Vital Signs Last 12 Hrs  T(F): 98 (06-18-25 @ 06:10), Max: 98.2 (06-17-25 @ 20:40)  HR: 60 (06-18-25 @ 06:10) (60 - 61)  BP: 117/74 (06-18-25 @ 06:10) (117/74 - 128/75)  BP(mean): --  RR: 18 (06-18-25 @ 06:10) (18 - 18)  SpO2: 95% (06-18-25 @ 06:10) (95% - 95%)  I&O's Summary      PHYSICAL EXAM:  Constitutional: NAD, comfortable in bed.  HEENT: NC/AT, PERRLA, EOMI, no conjunctival pallor or scleral icterus, MMM  Neck: Supple, no JVD  Respiratory: CTA B/L. No w/r/r.   Cardiovascular: RRR, normal S1 and S2, no m/r/g.   Gastrointestinal: +BS, soft NTND, no guarding or rebound tenderness, no palpable masses   Extremities: wwp; no cyanosis, clubbing or edema.   Vascular: Pulses equal and strong throughout.   Neurological: AAOx3, no CN deficits, strength and sensation intact throughout.   Skin: No gross skin abnormalities or rashes        LABS:                        12.7   6.17  )-----------( 166      ( 17 Jun 2025 05:30 )             39.1     06-17    139  |  104  |  18  ----------------------------<  92  4.0   |  25  |  1.49[H]    Ca    9.1      17 Jun 2025 05:30  Phos  3.2     06-17  Mg     1.7     06-17        Urinalysis Basic - ( 17 Jun 2025 05:30 )    Color: x / Appearance: x / SG: x / pH: x  Gluc: 92 mg/dL / Ketone: x  / Bili: x / Urobili: x   Blood: x / Protein: x / Nitrite: x   Leuk Esterase: x / RBC: x / WBC x   Sq Epi: x / Non Sq Epi: x / Bacteria: x          RADIOLOGY & ADDITIONAL TESTS:    MEDICATIONS  (STANDING):  dolutegravir 50 milliGRAM(s) Oral daily  emtricitabine 200 mG/tenofovir alafenamide 25 mG (DESCOVY) Tablet 1 Tablet(s) Oral daily  ferrous    sulfate 325 milliGRAM(s) Oral every 24 hours  finasteride 5 milliGRAM(s) Oral at bedtime  heparin   Injectable 5000 Unit(s) SubCutaneous every 8 hours  lidocaine   4% Patch 1 Patch Transdermal every 24 hours  lidocaine 2% Injectable 5 milliLiter(s) Local Injection once  losartan 25 milliGRAM(s) Oral daily  tamsulosin 0.4 milliGRAM(s) Oral at bedtime    MEDICATIONS  (PRN):  acetaminophen     Tablet .. 975 milliGRAM(s) Oral every 8 hours PRN Temp greater or equal to 38C (100.4F), Mild Pain (1 - 3)  cyclobenzaprine 10 milliGRAM(s) Oral three times a day PRN Muscle Spasm  melatonin 3 milliGRAM(s) Oral at bedtime PRN Insomnia  zolpidem 10 milliGRAM(s) Oral at bedtime PRN Insomnia     OVERNIGHT EVENTS: FLORA    SUBJECTIVE:  Patient seen and examined at bedside. States his hip and leg pain are improving, ambulating well with walker. Denies any LE numbness, bowel/bladder incontinence, saddle anesthesia, fevers, chills.     Vital Signs Last 12 Hrs  T(F): 98 (06-18-25 @ 06:10), Max: 98.2 (06-17-25 @ 20:40)  HR: 60 (06-18-25 @ 06:10) (60 - 61)  BP: 117/74 (06-18-25 @ 06:10) (117/74 - 128/75)  BP(mean): --  RR: 18 (06-18-25 @ 06:10) (18 - 18)  SpO2: 95% (06-18-25 @ 06:10) (95% - 95%)  I&O's Summary      PHYSICAL EXAM:  Constitutional: resting comfortably in bed; NAD  Respiratory: CTA B/L; no W/R/R  Cardiac: +S1/S2; RRR; no M/R/G  Gastrointestinal: abdomen soft, NT/ND; no rebound or guarding; +BSx4  Extremities: WWP, no clubbing or cyanosis; no peripheral edema  Vascular: 2+ radial, DP/PT pulses B/L  Neurologic: AAOx3, LLE strength 3/5, RLE strength 5/5  Psychiatric: affect and characteristics of appearance, verbalizations, behaviors are appropriate          LABS:                        12.7   6.17  )-----------( 166      ( 17 Jun 2025 05:30 )             39.1     06-17    139  |  104  |  18  ----------------------------<  92  4.0   |  25  |  1.49[H]    Ca    9.1      17 Jun 2025 05:30  Phos  3.2     06-17  Mg     1.7     06-17        Urinalysis Basic - ( 17 Jun 2025 05:30 )    Color: x / Appearance: x / SG: x / pH: x  Gluc: 92 mg/dL / Ketone: x  / Bili: x / Urobili: x   Blood: x / Protein: x / Nitrite: x   Leuk Esterase: x / RBC: x / WBC x   Sq Epi: x / Non Sq Epi: x / Bacteria: x          RADIOLOGY & ADDITIONAL TESTS:    MEDICATIONS  (STANDING):  dolutegravir 50 milliGRAM(s) Oral daily  emtricitabine 200 mG/tenofovir alafenamide 25 mG (DESCOVY) Tablet 1 Tablet(s) Oral daily  ferrous    sulfate 325 milliGRAM(s) Oral every 24 hours  finasteride 5 milliGRAM(s) Oral at bedtime  heparin   Injectable 5000 Unit(s) SubCutaneous every 8 hours  lidocaine   4% Patch 1 Patch Transdermal every 24 hours  lidocaine 2% Injectable 5 milliLiter(s) Local Injection once  losartan 25 milliGRAM(s) Oral daily  tamsulosin 0.4 milliGRAM(s) Oral at bedtime    MEDICATIONS  (PRN):  acetaminophen     Tablet .. 975 milliGRAM(s) Oral every 8 hours PRN Temp greater or equal to 38C (100.4F), Mild Pain (1 - 3)  cyclobenzaprine 10 milliGRAM(s) Oral three times a day PRN Muscle Spasm  melatonin 3 milliGRAM(s) Oral at bedtime PRN Insomnia  zolpidem 10 milliGRAM(s) Oral at bedtime PRN Insomnia

## 2025-06-19 ENCOUNTER — TRANSCRIPTION ENCOUNTER (OUTPATIENT)
Age: 65
End: 2025-06-19

## 2025-06-19 VITALS
TEMPERATURE: 98 F | RESPIRATION RATE: 16 BRPM | HEART RATE: 61 BPM | SYSTOLIC BLOOD PRESSURE: 112 MMHG | OXYGEN SATURATION: 96 % | DIASTOLIC BLOOD PRESSURE: 66 MMHG

## 2025-06-19 PROCEDURE — 36415 COLL VENOUS BLD VENIPUNCTURE: CPT

## 2025-06-19 PROCEDURE — 73552 X-RAY EXAM OF FEMUR 2/>: CPT

## 2025-06-19 PROCEDURE — 76376 3D RENDER W/INTRP POSTPROCES: CPT

## 2025-06-19 PROCEDURE — 73502 X-RAY EXAM HIP UNI 2-3 VIEWS: CPT

## 2025-06-19 PROCEDURE — 86901 BLOOD TYPING SEROLOGIC RH(D): CPT

## 2025-06-19 PROCEDURE — 85027 COMPLETE CBC AUTOMATED: CPT

## 2025-06-19 PROCEDURE — 84100 ASSAY OF PHOSPHORUS: CPT

## 2025-06-19 PROCEDURE — 99239 HOSP IP/OBS DSCHRG MGMT >30: CPT | Mod: GC

## 2025-06-19 PROCEDURE — 80048 BASIC METABOLIC PNL TOTAL CA: CPT

## 2025-06-19 PROCEDURE — 72100 X-RAY EXAM L-S SPINE 2/3 VWS: CPT

## 2025-06-19 PROCEDURE — 85025 COMPLETE CBC W/AUTO DIFF WBC: CPT

## 2025-06-19 PROCEDURE — 99285 EMERGENCY DEPT VISIT HI MDM: CPT

## 2025-06-19 PROCEDURE — 83735 ASSAY OF MAGNESIUM: CPT

## 2025-06-19 PROCEDURE — 72192 CT PELVIS W/O DYE: CPT

## 2025-06-19 PROCEDURE — 81003 URINALYSIS AUTO W/O SCOPE: CPT

## 2025-06-19 PROCEDURE — 85730 THROMBOPLASTIN TIME PARTIAL: CPT

## 2025-06-19 PROCEDURE — 86900 BLOOD TYPING SEROLOGIC ABO: CPT

## 2025-06-19 PROCEDURE — 86850 RBC ANTIBODY SCREEN: CPT

## 2025-06-19 PROCEDURE — 97165 OT EVAL LOW COMPLEX 30 MIN: CPT

## 2025-06-19 PROCEDURE — 85610 PROTHROMBIN TIME: CPT

## 2025-06-19 PROCEDURE — 86140 C-REACTIVE PROTEIN: CPT

## 2025-06-19 PROCEDURE — 85652 RBC SED RATE AUTOMATED: CPT

## 2025-06-19 PROCEDURE — 97161 PT EVAL LOW COMPLEX 20 MIN: CPT

## 2025-06-19 RX ADMIN — LOSARTAN POTASSIUM 25 MILLIGRAM(S): 100 TABLET, FILM COATED ORAL at 06:12

## 2025-06-19 RX ADMIN — HEPARIN SODIUM 5000 UNIT(S): 1000 INJECTION INTRAVENOUS; SUBCUTANEOUS at 06:12

## 2025-06-19 NOTE — PROGRESS NOTE ADULT - PROBLEM SELECTOR PLAN 1
Chronic; Femur XR in 3/2024 revealed evidence of loosened implant. Per Dr. Diaz's (Ortho) OP note, the loose prosthesis has been impacting his function and ability to walk, he now requires assistive devices in addition to experiencing increasing pain, functional limitations and worsening limp. Patient was offered and elected for revision surgery. However, that has not yet occurred as he wanted to address his back issues first.. He is now presenting with progression of these symptoms. No periprosthetic fracture on XR/CT. Periprosthetic infection unlikely given negative CT findings, WBC and inflammatory markers being wnl.  Admitted to medicine for inability to ambulate,   - PT/OT -> no needs, rec walker and shower chair  - LLE WBAT   - Ortho consulted, no inpatient surgical intervention and will follow up outpatient. Received steroid injection to L knee today

## 2025-06-19 NOTE — PROGRESS NOTE ADULT - PROBLEM SELECTOR PLAN 5
Cr on admission 1.46, BUN 19, eGFR 53.  Cr on outpatient labs 5/22/25 1.49 and 1.63 2/20/25.   C/w CKD stage 3A.   No evidence of IRAIS on CKD.  - Renally dose meds   - Avoid nephrotoxic meds  - Continue to f/u closely with PCP

## 2025-06-19 NOTE — PROGRESS NOTE ADULT - PROBLEM SELECTOR PLAN 3
- C/w home Olmesartan 10 mg QD
/77 06-16  - C/w home Olmesartan 10 mg QD

## 2025-06-19 NOTE — PROGRESS NOTE ADULT - SUBJECTIVE AND OBJECTIVE BOX
**INCOMPLETE NOTE    OVERNIGHT EVENTS:    SUBJECTIVE:  Patient seen and examined at bedside.    Vital Signs Last 12 Hrs  T(F): 98.3 (06-19-25 @ 06:05), Max: 98.3 (06-19-25 @ 06:05)  HR: 63 (06-19-25 @ 06:05) (63 - 67)  BP: 117/70 (06-19-25 @ 06:05) (117/70 - 135/90)  BP(mean): --  RR: 18 (06-19-25 @ 06:05) (18 - 18)  SpO2: 95% (06-19-25 @ 06:05) (95% - 95%)  I&O's Summary      PHYSICAL EXAM:  Constitutional: NAD, comfortable in bed.  HEENT: NC/AT, PERRLA, EOMI, no conjunctival pallor or scleral icterus, MMM  Neck: Supple, no JVD  Respiratory: CTA B/L. No w/r/r.   Cardiovascular: RRR, normal S1 and S2, no m/r/g.   Gastrointestinal: +BS, soft NTND, no guarding or rebound tenderness, no palpable masses   Extremities: wwp; no cyanosis, clubbing or edema.   Vascular: Pulses equal and strong throughout.   Neurological: AAOx3, no CN deficits, strength and sensation intact throughout.   Skin: No gross skin abnormalities or rashes        LABS:                  RADIOLOGY & ADDITIONAL TESTS:    MEDICATIONS  (STANDING):  bacitracin   Ointment 1 Application(s) Topical every 24 hours  dolutegravir 50 milliGRAM(s) Oral daily  emtricitabine 200 mG/tenofovir alafenamide 25 mG (DESCOVY) Tablet 1 Tablet(s) Oral daily  ferrous    sulfate 325 milliGRAM(s) Oral every 24 hours  finasteride 5 milliGRAM(s) Oral at bedtime  heparin   Injectable 5000 Unit(s) SubCutaneous every 8 hours  lidocaine   4% Patch 1 Patch Transdermal every 24 hours  lidocaine 2% Injectable 5 milliLiter(s) Local Injection once  losartan 25 milliGRAM(s) Oral daily  tamsulosin 0.4 milliGRAM(s) Oral at bedtime    MEDICATIONS  (PRN):  acetaminophen     Tablet .. 975 milliGRAM(s) Oral every 8 hours PRN Temp greater or equal to 38C (100.4F), Mild Pain (1 - 3)  cyclobenzaprine 10 milliGRAM(s) Oral three times a day PRN Muscle Spasm  melatonin 3 milliGRAM(s) Oral at bedtime PRN Insomnia  zolpidem 10 milliGRAM(s) Oral at bedtime PRN Insomnia

## 2025-06-19 NOTE — PROGRESS NOTE ADULT - PROBLEM SELECTOR PROBLEM 1
Loosening of prosthetic hip

## 2025-06-19 NOTE — PROGRESS NOTE ADULT - PROBLEM SELECTOR PLAN 9
F: none   E: Replete as needed  N: Regular   DVT: Heparin SC   GI: Not indicated

## 2025-06-19 NOTE — PROGRESS NOTE ADULT - ASSESSMENT
I saw and evaluated this patient today.  I reviewed the x-rays as well as the CT scan.  He has an implant which is loose (femoral implant).  I do not see any new fracture related to this acute exacerbation of pain but I suspect it is related to the loosening.  I would recommend revision to address that.  We discussed this procedure at length.  It could involve an osteotomy even though the implant is loose and we discussed that.  We discussed the recovery and risks as well.  The risk discussed are shown below.    Category 1 includes risks that could occur in association with any operation. They include heart attack, stroke, blood clot and pulmonary embolism, wound infection, transfusion reaction, drug allergy, and complications related to anesthesia. This list is not intended to be complete but only to convey a broad range of general medical risks to be aware of.    Blood clots may lead to a block in circulation. A blood clot that completely blocks a large artery can lead to gangrene. If this happens an amputation may be required. Blood clots in leg veins lead to pain and swelling. If part of the clot breaks off it can travel to the brain and lead to a stroke. A clot can also travel to the lung, resulting in a pulmonary embolism. Medication after surgery will minimize but not eliminate these complications.    Category 2 is a list of risks and complications specifically related to total or partial joint replacement. This list is not complete but is intended to make the patient aware of the kinds of implant-related risks and complications that might occur.      1. If the device gets loose or wears out further surgery may be required to revise the prosthesis.  2. If an infection develops, further surgery to washout the joint, remove or replace parts, or insert an antibiotic spacer may be required  3. Muscle, Tendon, Nerve and blood vessel damage may result as a consequence of mobilization of the joint or dissection near these structures. These injuries can lead to weakness, numbness and paralysis. The damage may be temporary or permanent. The recovery process can be long and may require further procedures.    4. Dislocations and instability may also require further surgery.    5. Periprosthetic fracture requiring revision surgery.  6. Leg length discrepancy   7. Stiffness  8. Wound complications requiring either local wound care, revision surgery, or plastic surgery consultation   9. Chronic pain requiring pain management      We did also discussed the risk of nonoperative treatment including fracture I recommend protected weightbearing as this episode could portend imminent fracture although I cannot predict the exact timing of this and we discussed that at length.  He is interested in surgery but is not sure if he would actually like to proceed and wanted to consult with his family/sister first.  In the interim again I recommended protected weightbearing to avoid fracture which would make the reconstruction more challenging and with more risks.  He is instructed to contact my office if you would like to discuss further or would like to move forward and we will try to do so as expeditiously as possible.
I M    65 yo M with PMHx HIV, osteoarthritis, syphilis (treated, RPR 1:2), BPH, HTN, CKD, anemia, osteoarthritis, severe lumbar spondylosis s/p medial branch blocks, hip replacement (in 2007 for avascular necrosis, thought to be secondary to HIV and/or its meds)complicated by leg length descrepency, loosening of prosthetic hip, chronic pain and difficulty ambulating presenting with left hip pain and difficulty ambulating.       Problem/Plan - 1:  ·  Problem: Loosening of prosthetic hip.   ·  Plan: Chronic; Femur XR in 3/2024 revealed evidence of loosened implant. Per Dr. Diaz's (Ortho) OP note, the loose prosthesis has been impacting his function and ability to walk, he now requires assistive devices in addition to experiencing increasing pain, functional limitations and worsening limp. Patient was offered and elected for revision surgery. However, that has not yet occurred as he wanted to address his back issues first.. He is now presenting with progression of these symptoms. No periprosthetic fracture on XR/CT. Periprosthetic infection unlikely given negative CT findings, WBC and inflammatory markers being wnl.  Admitted to medicine for inability to ambulate,   - PT/OT -> no needs, rec walker and shower chair  - LLE WBAT   - Ortho consulted, no inpatient surgical intervention and will follow up outpatient. Possible steroid injectin L knee today.    Problem/Plan - 2:  ·  Problem: HIV disease.   ·  Plan: Home meds: Tivicay 50 mg QD, Descovy 200-25 mg QD for PrEP, Doxy PEP, Valtrex 1 mg BID for HSV infection   Follows with Dr. Oliver (PCP). VL undetectable, CD4 absolute 518, 43% on 5/22/2025 per MyChart.   - C/w  home meds.    Problem/Plan - 3:  ·  Problem: Hypertension.   ·  Plan: - C/w home Olmesartan 10 mg QD.    Problem/Plan - 4:  ·  Problem: BPH (benign prostatic hyperplasia).   ·  Plan: - C/w home Flomax 0.4 mg QHS and Finasteride 5 mg QHS.    Problem/Plan - 5:  ·  Problem: Stage 3 chronic kidney disease.   ·  Plan: Cr on admission 1.46, BUN 19, eGFR 53.  Cr on outpatient labs 5/22/25 1.49 and 1.63 2/20/25.   C/w CKD stage 3A.   No evidence of IRAIS on CKD.  - Renally dose meds   - Avoid nephrotoxic meds  - Continue to f/u closely with PCP.    Problem/Plan - 6:  ·  Problem: Anemia.   ·  Plan: Known history of JACKSON and Vit B12 deficiency. Hb 12.5, at baseline.   Patient takes PO iron supplementation and B12 injections.   - C/w PO iron supplementation 325 mg QD   - C/e B12 injections outpatient.    Problem/Plan - 7:  ·  Problem: Insomnia.   ·  Plan: - C/w home Ambien 10 mg QHS.    Problem/Plan - 8:  ·  Problem: Lumbar spondylosis.   ·  Plan: Patient follows with PM&R for severe lumbar spondylosis, currently s/p medial branch blocks.  - C/w home Flexaril 10 mg TID PRN for muscle spasms  - Continue to follow closely with PM&R.    Problem/Plan - 9:  ·  Problem: Prophylactic measure.   ·  Plan: F: none   E: Replete as needed  N: Regular   DVT: Heparin SC   GI: Not indicated.      
Patient is a 65 yo M with PMHx HIV, osteoarthritis, syphilis (treated, RPR 1:2), BPH, HTN, CKD, anemia, osteoarthritis, severe lumbar spondylosis s/p medial branch blocks, hip replacement (in 2007 for avascular necrosis, thought to be secondary to HIV and/or its meds)complicated by leg length descrepency, loosening of prosthetic hip, chronic pain and difficulty ambulating presenting with left hip pain and difficulty ambulating. 
Patient is a 63 yo M with PMHx HIV, osteoarthritis, syphilis (treated, RPR 1:2), BPH, HTN, CKD, anemia, osteoarthritis, severe lumbar spondylosis s/p medial branch blocks, hip replacement (in 2007 for avascular necrosis, thought to be secondary to HIV and/or its meds)complicated by leg length descrepency, loosening of prosthetic hip, chronic pain and difficulty ambulating presenting with left hip pain and difficulty ambulating. 
Patient is a 65 yo M with PMHx HIV, osteoarthritis, syphilis (treated, RPR 1:2), BPH, HTN, CKD, anemia, severe lumbar spondylosis s/p medial branch blocks, hip replacement (in 2007 for avascular necrosis, thought to be secondary to HIV and/or its meds)complicated by leg length discrepency, loosening of prosthetic hip, chronic pain and difficulty ambulating presenting with left hip pain and difficulty ambulating. 
Patient is a 65 yo M with PMHx HIV, osteoarthritis, syphilis (treated, RPR 1:2), BPH, HTN, CKD, anemia, osteoarthritis, severe lumbar spondylosis s/p medial branch blocks, hip replacement (in 2007 for avascular necrosis, thought to be secondary to HIV and/or its meds)complicated by leg length descrepency, loosening of prosthetic hip, chronic pain and difficulty ambulating presenting with left hip pain and difficulty ambulating.

## 2025-06-19 NOTE — DISCHARGE NOTE NURSING/CASE MANAGEMENT/SOCIAL WORK - PATIENT PORTAL LINK FT
You can access the FollowMyHealth Patient Portal offered by [facility] by registering at the following website: [email or web address]. By joining [employer]’s FollowMyHealth portal, you will also be able to view your health information using other applications (apps) compatible with our system.

## 2025-06-19 NOTE — PROGRESS NOTE ADULT - PROBLEM SELECTOR PLAN 7
- C/w home Ambien 10 mg QHS

## 2025-06-19 NOTE — PROGRESS NOTE ADULT - PROBLEM SELECTOR PLAN 8
Patient follows with PM&R for severe lumbar spondylosis, currently s/p medial branch blocks.  - C/w home Flexaril 10 mg TID PRN for muscle spasms  - Continue to follow closely with PM&R

## 2025-06-19 NOTE — DISCHARGE NOTE NURSING/CASE MANAGEMENT/SOCIAL WORK - FINANCIAL ASSISTANCE
[facility] provides services at a reduced cost to those who are determined to be eligible through [facility]’s financial assistance program. Information regarding [facility]’s financial assistance program can be found by going to [email or web address] or by calling [phone].

## 2025-06-19 NOTE — PROGRESS NOTE ADULT - PROBLEM SELECTOR PLAN 4
- C/w home Flomax 0.4 mg QHS and Finasteride 5 mg QHS
CT shows moderate to severe prostatomegaly. Pt reports no difficulties with urination.  - C/w home Flomax 0.4 mg QHS and Finasteride 5 mg QHS

## 2025-06-19 NOTE — PROGRESS NOTE ADULT - PROBLEM/PLAN-8
DISPLAY PLAN FREE TEXT
DISPLAY PLAN FREE TEXT
Color consistent with ethnicity/race, warm, dry intact, resilient.
DISPLAY PLAN FREE TEXT
DISPLAY PLAN FREE TEXT

## 2025-06-19 NOTE — PROGRESS NOTE ADULT - PROBLEM SELECTOR PROBLEM 5
Stage 3 chronic kidney disease
CKD (chronic kidney disease)
Stage 3 chronic kidney disease
Stage 3 chronic kidney disease

## 2025-06-19 NOTE — PROGRESS NOTE ADULT - PROVIDER SPECIALTY LIST ADULT
Internal Medicine
Orthopedics
Rehab Medicine
Orthopedics
Internal Medicine

## 2025-06-19 NOTE — PROGRESS NOTE ADULT - SUBJECTIVE AND OBJECTIVE BOX
SUBJECTIVE: Pt seen and examined on morning rounds. Pt denies cp/sob/n/v/ha    Vital Signs Last 24 Hrs  T(C): 36.8 (19 Jun 2025 06:05), Max: 36.8 (18 Jun 2025 12:38)  T(F): 98.3 (19 Jun 2025 06:05), Max: 98.3 (18 Jun 2025 12:38)  HR: 63 (19 Jun 2025 06:05) (63 - 69)  BP: 117/70 (19 Jun 2025 06:05) (117/70 - 135/90)  BP(mean): --  RR: 18 (19 Jun 2025 06:05) (18 - 18)  SpO2: 95% (19 Jun 2025 06:05) (95% - 95%)    Parameters below as of 19 Jun 2025 06:05  Patient On (Oxygen Delivery Method): room air        Physical Exam:  General: NAD, resting comfortably in bed  LLE:  SILT SPN/DPN/Saph/Sushil/Tib  Motor 5/5 Q/HS/TA/GS/EHL/FHL, able to straight leg raise  Pulses 2+ dp    Assessment/Plan:  64yM with L FRITZ place in 2007 now complicated by hardware loosening  - WBAT LLE  - pain control  - L knee cortisone injection 6/17  - DVT PPx per primary team  - continue to ambulate as tolerated with assistive device  - schedule outpatient revision FRITZ with Dr. Diaz

## 2025-06-23 ENCOUNTER — NON-APPOINTMENT (OUTPATIENT)
Age: 65
End: 2025-06-23

## 2025-06-23 ENCOUNTER — APPOINTMENT (OUTPATIENT)
Dept: ORTHOPEDIC SURGERY | Facility: CLINIC | Age: 65
End: 2025-06-23
Payer: MEDICARE

## 2025-06-23 VITALS
WEIGHT: 212 LBS | HEART RATE: 56 BPM | HEIGHT: 75 IN | OXYGEN SATURATION: 97 % | DIASTOLIC BLOOD PRESSURE: 85 MMHG | SYSTOLIC BLOOD PRESSURE: 130 MMHG | BODY MASS INDEX: 26.36 KG/M2

## 2025-06-23 PROBLEM — Z22.322 CARRIER OR SUSPECTED CARRIER OF METHICILLIN RESISTANT STAPHYLOCOCCUS AUREUS: Status: ACTIVE | Noted: 2025-06-23

## 2025-06-23 PROBLEM — Z01.818 ENCOUNTER FOR OTHER PREPROCEDURAL EXAMINATION: Status: ACTIVE | Noted: 2025-06-23 | Resolved: 2025-07-07

## 2025-06-23 PROBLEM — E55.9 VITAMIN D DEFICIENCY, UNSPECIFIED: Status: ACTIVE | Noted: 2025-06-23

## 2025-06-23 PROBLEM — E83.10 DISORDER OF IRON METABOLISM: Status: ACTIVE | Noted: 2025-06-23

## 2025-06-23 LAB
ALBUMIN SERPL ELPH-MCNC: 4.2 G/DL
ALP BLD-CCNC: 56 U/L
ALT SERPL-CCNC: 22 U/L
ANION GAP SERPL CALC-SCNC: 12 MMOL/L
AST SERPL-CCNC: 30 U/L
BILIRUB SERPL-MCNC: 1 MG/DL
BUN SERPL-MCNC: 23 MG/DL
CALCIUM SERPL-MCNC: 10.3 MG/DL
CHLORIDE SERPL-SCNC: 102 MMOL/L
CO2 SERPL-SCNC: 26 MMOL/L
CREAT SERPL-MCNC: 1.65 MG/DL
CRP SERPL-MCNC: <3 MG/L
EGFRCR SERPLBLD CKD-EPI 2021: 46 ML/MIN/1.73M2
GLUCOSE SERPL-MCNC: 85 MG/DL
MRSA SPEC QL CULT: NEGATIVE
POTASSIUM SERPL-SCNC: 4.6 MMOL/L
PREALB SERPL NEPH-MCNC: 25 MG/DL
PROT SERPL-MCNC: 6.5 G/DL
SODIUM SERPL-SCNC: 141 MMOL/L
STAPH AUREUS (SA): NEGATIVE
TRANSFERRIN SERPL-MCNC: 187 MG/DL

## 2025-06-23 PROCEDURE — 99214 OFFICE O/P EST MOD 30 MIN: CPT

## 2025-06-24 LAB
24R-OH-CALCIDIOL SERPL-MCNC: 54.9 PG/ML
BASOPHILS # BLD AUTO: 0.03 K/UL
BASOPHILS NFR BLD AUTO: 0.5 %
DEPRECATED D DIMER PPP IA-ACNC: 993 NG/ML DDU
EOSINOPHIL # BLD AUTO: 0.07 K/UL
EOSINOPHIL NFR BLD AUTO: 1.1 %
ERYTHROCYTE [SEDIMENTATION RATE] IN BLOOD BY WESTERGREN METHOD: 2 MM/HR
HCT VFR BLD CALC: 43.6 %
HGB BLD-MCNC: 13.1 G/DL
IMM GRANULOCYTES NFR BLD AUTO: 0.5 %
LYMPHOCYTES # BLD AUTO: 1.62 K/UL
LYMPHOCYTES NFR BLD AUTO: 25.8 %
MAN DIFF?: NORMAL
MCHC RBC-ENTMCNC: 30 G/DL
MCHC RBC-ENTMCNC: 30.5 PG
MCV RBC AUTO: 101.4 FL
MONOCYTES # BLD AUTO: 0.59 K/UL
MONOCYTES NFR BLD AUTO: 9.4 %
NEUTROPHILS # BLD AUTO: 3.93 K/UL
NEUTROPHILS NFR BLD AUTO: 62.7 %
PLATELET # BLD AUTO: 196 K/UL
RBC # BLD: 4.3 M/UL
RBC # FLD: 12.7 %
WBC # FLD AUTO: 6.27 K/UL

## 2025-06-25 ENCOUNTER — APPOINTMENT (OUTPATIENT)
Dept: PHYSICAL MEDICINE AND REHAB | Facility: CLINIC | Age: 65
End: 2025-06-25
Payer: MEDICARE

## 2025-06-25 VITALS
HEIGHT: 75 IN | OXYGEN SATURATION: 98 % | DIASTOLIC BLOOD PRESSURE: 74 MMHG | WEIGHT: 212 LBS | HEART RATE: 77 BPM | SYSTOLIC BLOOD PRESSURE: 111 MMHG | BODY MASS INDEX: 26.36 KG/M2
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
## 2025-06-25 DIAGNOSIS — B20 HUMAN IMMUNODEFICIENCY VIRUS [HIV] DISEASE: ICD-10-CM

## 2025-06-25 DIAGNOSIS — I12.9 HYPERTENSIVE CHRONIC KIDNEY DISEASE WITH STAGE 1 THROUGH STAGE 4 CHRONIC KIDNEY DISEASE, OR UNSPECIFIED CHRONIC KIDNEY DISEASE: ICD-10-CM

## 2025-06-25 DIAGNOSIS — N28.1 CYST OF KIDNEY, ACQUIRED: ICD-10-CM

## 2025-06-25 DIAGNOSIS — T84.031A MECHANICAL LOOSENING OF INTERNAL LEFT HIP PROSTHETIC JOINT, INITIAL ENCOUNTER: ICD-10-CM

## 2025-06-25 DIAGNOSIS — D63.1 ANEMIA IN CHRONIC KIDNEY DISEASE: ICD-10-CM

## 2025-06-25 DIAGNOSIS — N40.0 BENIGN PROSTATIC HYPERPLASIA WITHOUT LOWER URINARY TRACT SYMPTOMS: ICD-10-CM
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
## 2025-06-25 DIAGNOSIS — R26.2 DIFFICULTY IN WALKING, NOT ELSEWHERE CLASSIFIED: ICD-10-CM

## 2025-06-25 DIAGNOSIS — M17.12 UNILATERAL PRIMARY OSTEOARTHRITIS, LEFT KNEE: ICD-10-CM

## 2025-06-25 PROCEDURE — 99214 OFFICE O/P EST MOD 30 MIN: CPT

## 2025-06-25 RX ORDER — TRAMADOL HYDROCHLORIDE 50 MG/1
50 TABLET, COATED ORAL 4 TIMES DAILY
Qty: 120 | Refills: 0 | Status: ACTIVE | COMMUNITY
Start: 2025-06-25 | End: 1900-01-01

## 2025-06-26 ENCOUNTER — NON-APPOINTMENT (OUTPATIENT)
Age: 65
End: 2025-06-26

## 2025-07-02 ENCOUNTER — APPOINTMENT (OUTPATIENT)
Age: 65
End: 2025-07-02

## 2025-07-02 PROCEDURE — 64636 DESTROY L/S FACET JNT ADDL: CPT | Mod: RT

## 2025-07-02 PROCEDURE — 64635 DESTROY LUMB/SAC FACET JNT: CPT | Mod: 50

## 2025-07-09 ENCOUNTER — OUTPATIENT (OUTPATIENT)
Dept: OUTPATIENT SERVICES | Facility: HOSPITAL | Age: 65
LOS: 1 days | End: 2025-07-09
Payer: MEDICARE

## 2025-07-09 ENCOUNTER — RESULT REVIEW (OUTPATIENT)
Age: 65
End: 2025-07-09

## 2025-07-09 ENCOUNTER — APPOINTMENT (OUTPATIENT)
Dept: INTERVENTIONAL RADIOLOGY/VASCULAR | Facility: HOSPITAL | Age: 65
End: 2025-07-09

## 2025-07-09 PROCEDURE — 20611 DRAIN/INJ JOINT/BURSA W/US: CPT

## 2025-07-09 PROCEDURE — 89051 BODY FLUID CELL COUNT: CPT

## 2025-07-09 PROCEDURE — 87070 CULTURE OTHR SPECIMN AEROBIC: CPT

## 2025-07-09 PROCEDURE — 87999 UNLISTED MICROBIOLOGY PX: CPT

## 2025-07-09 PROCEDURE — 87015 SPECIMEN INFECT AGNT CONCNTJ: CPT

## 2025-07-09 PROCEDURE — 89060 EXAM SYNOVIAL FLUID CRYSTALS: CPT

## 2025-07-09 PROCEDURE — 87075 CULTR BACTERIA EXCEPT BLOOD: CPT

## 2025-07-09 PROCEDURE — 20611 DRAIN/INJ JOINT/BURSA W/US: CPT | Mod: LT

## 2025-07-09 PROCEDURE — 87205 SMEAR GRAM STAIN: CPT

## 2025-07-16 ENCOUNTER — APPOINTMENT (OUTPATIENT)
Dept: ORTHOPEDIC SURGERY | Facility: CLINIC | Age: 65
End: 2025-07-16
Payer: MEDICARE

## 2025-07-16 PROCEDURE — 20610 DRAIN/INJ JOINT/BURSA W/O US: CPT | Mod: LT

## 2025-07-17 RX ORDER — ONDANSETRON 4 MG/1
4 TABLET ORAL
Qty: 16 | Refills: 0 | Status: ACTIVE | COMMUNITY
Start: 2025-07-17 | End: 1900-01-01

## 2025-07-17 RX ORDER — ACETAMINOPHEN 500 MG/1
500 TABLET, COATED ORAL
Qty: 84 | Refills: 0 | Status: ACTIVE | COMMUNITY
Start: 2025-07-17 | End: 1900-01-01

## 2025-07-17 RX ORDER — POLYETHYLENE GLYCOL 3350 17 G/17G
17 POWDER, FOR SOLUTION ORAL
Qty: 14 | Refills: 0 | Status: ACTIVE | COMMUNITY
Start: 2025-07-17 | End: 1900-01-01

## 2025-07-17 RX ORDER — CELECOXIB 200 MG/1
200 CAPSULE ORAL TWICE DAILY
Qty: 60 | Refills: 0 | Status: ACTIVE | COMMUNITY
Start: 2025-07-17 | End: 1900-01-01

## 2025-07-17 RX ORDER — FAMOTIDINE 20 MG/1
20 TABLET, FILM COATED ORAL DAILY
Qty: 30 | Refills: 0 | Status: ACTIVE | COMMUNITY
Start: 2025-07-17 | End: 1900-01-01

## 2025-07-17 RX ORDER — ASPIRIN 81 MG/1
81 TABLET, DELAYED RELEASE ORAL
Qty: 60 | Refills: 0 | Status: ACTIVE | COMMUNITY
Start: 2025-07-17 | End: 1900-01-01

## 2025-07-17 RX ORDER — TRAMADOL HYDROCHLORIDE 50 MG/1
50 TABLET, COATED ORAL
Qty: 25 | Refills: 0 | Status: ACTIVE | COMMUNITY
Start: 2025-07-17 | End: 1900-01-01

## 2025-07-18 RX ORDER — HYALURONATE SODIUM, STABILIZED 88 MG/4 ML
88 SYRINGE (ML) INTRAARTICULAR
Qty: 1 | Refills: 0 | Status: ACTIVE | COMMUNITY
Start: 2025-07-18 | End: 1900-01-01

## 2025-07-21 ENCOUNTER — APPOINTMENT (OUTPATIENT)
Dept: PHYSICAL MEDICINE AND REHAB | Facility: CLINIC | Age: 65
End: 2025-07-21
Payer: MEDICARE

## 2025-07-21 VITALS
HEART RATE: 89 BPM | OXYGEN SATURATION: 94 % | SYSTOLIC BLOOD PRESSURE: 112 MMHG | HEIGHT: 75 IN | BODY MASS INDEX: 26.36 KG/M2 | DIASTOLIC BLOOD PRESSURE: 73 MMHG | WEIGHT: 212 LBS

## 2025-07-21 DIAGNOSIS — M47.816 SPONDYLOSIS W/OUT MYELOPATHY OR RADICULOPATHY, LUMBAR REGION: ICD-10-CM

## 2025-07-21 DIAGNOSIS — M17.12 UNILATERAL PRIMARY OSTEOARTHRITIS, LEFT KNEE: ICD-10-CM

## 2025-07-21 PROCEDURE — 99214 OFFICE O/P EST MOD 30 MIN: CPT

## 2025-07-22 RX ORDER — POVIDONE-IODINE 7.5 %
1 SOLUTION, NON-ORAL TOPICAL ONCE
Refills: 0 | Status: COMPLETED | OUTPATIENT
Start: 2025-08-19 | End: 2025-08-19

## 2025-07-23 ENCOUNTER — APPOINTMENT (OUTPATIENT)
Dept: ORTHOPEDIC SURGERY | Facility: CLINIC | Age: 65
End: 2025-07-23
Payer: MEDICARE

## 2025-07-23 DIAGNOSIS — Z96.642 PRESENCE OF LEFT ARTIFICIAL HIP JOINT: ICD-10-CM

## 2025-07-23 DIAGNOSIS — T84.031A: ICD-10-CM

## 2025-07-23 PROCEDURE — 99211 OFF/OP EST MAY X REQ PHY/QHP: CPT

## 2025-07-24 ENCOUNTER — APPOINTMENT (OUTPATIENT)
Dept: ORTHOPEDIC SURGERY | Facility: HOSPITAL | Age: 65
End: 2025-07-24

## 2025-08-01 ENCOUNTER — NON-APPOINTMENT (OUTPATIENT)
Age: 65
End: 2025-08-01

## 2025-08-01 PROBLEM — Z87.438 PERSONAL HISTORY OF OTHER DISEASES OF MALE GENITAL ORGANS: Chronic | Status: ACTIVE | Noted: 2025-07-23

## 2025-08-01 PROBLEM — I10 ESSENTIAL (PRIMARY) HYPERTENSION: Chronic | Status: ACTIVE | Noted: 2025-07-23

## 2025-08-01 PROBLEM — B20 HUMAN IMMUNODEFICIENCY VIRUS [HIV] DISEASE: Chronic | Status: ACTIVE | Noted: 2025-07-23

## 2025-08-01 PROBLEM — M19.90 UNSPECIFIED OSTEOARTHRITIS, UNSPECIFIED SITE: Chronic | Status: ACTIVE | Noted: 2025-07-23

## 2025-08-18 ENCOUNTER — APPOINTMENT (OUTPATIENT)
Dept: ORTHOPEDIC SURGERY | Facility: CLINIC | Age: 65
End: 2025-08-18
Payer: MEDICARE

## 2025-08-18 VITALS
DIASTOLIC BLOOD PRESSURE: 72 MMHG | HEART RATE: 61 BPM | RESPIRATION RATE: 16 BRPM | WEIGHT: 205.03 LBS | TEMPERATURE: 97 F | OXYGEN SATURATION: 97 % | HEIGHT: 75 IN | SYSTOLIC BLOOD PRESSURE: 121 MMHG

## 2025-08-18 VITALS — WEIGHT: 212 LBS | RESPIRATION RATE: 18 BRPM | BODY MASS INDEX: 26.36 KG/M2 | HEIGHT: 75 IN

## 2025-08-18 PROCEDURE — 20610 DRAIN/INJ JOINT/BURSA W/O US: CPT | Mod: LT

## 2025-08-18 RX ORDER — ASPIRIN 325 MG
1 TABLET ORAL
Refills: 0 | DISCHARGE

## 2025-08-18 RX ORDER — DOCUSATE SODIUM 100 MG
1 CAPSULE ORAL
Refills: 0 | DISCHARGE

## 2025-08-19 ENCOUNTER — RESULT REVIEW (OUTPATIENT)
Age: 65
End: 2025-08-19

## 2025-08-19 ENCOUNTER — INPATIENT (INPATIENT)
Facility: HOSPITAL | Age: 65
LOS: 14 days | Discharge: HOME | End: 2025-09-03
Attending: SPECIALIST | Admitting: SPECIALIST
Payer: MEDICARE

## 2025-08-19 ENCOUNTER — TRANSCRIPTION ENCOUNTER (OUTPATIENT)
Age: 65
End: 2025-08-19

## 2025-08-19 ENCOUNTER — APPOINTMENT (OUTPATIENT)
Dept: ORTHOPEDIC SURGERY | Facility: HOSPITAL | Age: 65
End: 2025-08-19

## 2025-08-19 DIAGNOSIS — Z96.649 PRESENCE OF UNSPECIFIED ARTIFICIAL HIP JOINT: Chronic | ICD-10-CM

## 2025-08-19 DIAGNOSIS — E78.5 HYPERLIPIDEMIA, UNSPECIFIED: ICD-10-CM

## 2025-08-19 DIAGNOSIS — I63.9 CEREBRAL INFARCTION, UNSPECIFIED: ICD-10-CM

## 2025-08-19 DIAGNOSIS — I10 ESSENTIAL (PRIMARY) HYPERTENSION: ICD-10-CM

## 2025-08-19 DIAGNOSIS — B20 HUMAN IMMUNODEFICIENCY VIRUS [HIV] DISEASE: ICD-10-CM

## 2025-08-19 DIAGNOSIS — M16.12 UNILATERAL PRIMARY OSTEOARTHRITIS, LEFT HIP: ICD-10-CM

## 2025-08-19 PROCEDURE — 82330 ASSAY OF CALCIUM: CPT

## 2025-08-19 PROCEDURE — 87075 CULTR BACTERIA EXCEPT BLOOD: CPT

## 2025-08-19 PROCEDURE — 82805 BLOOD GASES W/O2 SATURATION: CPT

## 2025-08-19 PROCEDURE — 82947 ASSAY GLUCOSE BLOOD QUANT: CPT

## 2025-08-19 PROCEDURE — 84132 ASSAY OF SERUM POTASSIUM: CPT

## 2025-08-19 PROCEDURE — 72170 X-RAY EXAM OF PELVIS: CPT | Mod: 26

## 2025-08-19 PROCEDURE — 88300 SURGICAL PATH GROSS: CPT | Mod: 26

## 2025-08-19 PROCEDURE — 27134 REVISE HIP JOINT REPLACEMENT: CPT | Mod: LT

## 2025-08-19 PROCEDURE — 87205 SMEAR GRAM STAIN: CPT

## 2025-08-19 PROCEDURE — 84295 ASSAY OF SERUM SODIUM: CPT

## 2025-08-19 PROCEDURE — 36415 COLL VENOUS BLD VENIPUNCTURE: CPT

## 2025-08-19 DEVICE — HEAD DELTA BIOLOX V40 28MM CERAMIC: Type: IMPLANTABLE DEVICE | Site: LEFT | Status: FUNCTIONAL

## 2025-08-19 DEVICE — IMPLANTABLE DEVICE: Type: IMPLANTABLE DEVICE | Site: LEFT | Status: FUNCTIONAL

## 2025-08-19 DEVICE — CABLE/SLV SET BEAD MED CABLE 2MM: Type: IMPLANTABLE DEVICE | Site: LEFT | Status: FUNCTIONAL

## 2025-08-19 DEVICE — GUID ROD 3X1000MM: Type: IMPLANTABLE DEVICE | Site: LEFT | Status: FUNCTIONAL

## 2025-08-19 DEVICE — SCREW CANC SPHER 6.5X30MM: Type: IMPLANTABLE DEVICE | Site: LEFT | Status: FUNCTIONAL

## 2025-08-19 DEVICE — SCREW CANC SPHER 6.5X20MM: Type: IMPLANTABLE DEVICE | Site: LEFT | Status: FUNCTIONAL

## 2025-08-19 DEVICE — BONE FILLER BIOCOMPOSITE STIMULAN RAPID CURE 10CC: Type: IMPLANTABLE DEVICE | Site: LEFT | Status: FUNCTIONAL

## 2025-08-19 RX ORDER — ASPIRIN 325 MG
81 TABLET ORAL
Refills: 0 | Status: DISCONTINUED | OUTPATIENT
Start: 2025-08-20 | End: 2025-08-22

## 2025-08-19 RX ORDER — HYDROMORPHONE/SOD CHLOR,ISO/PF 2 MG/10 ML
0.5 SYRINGE (ML) INJECTION ONCE
Refills: 0 | Status: DISCONTINUED | OUTPATIENT
Start: 2025-08-19 | End: 2025-08-20

## 2025-08-19 RX ORDER — POLYETHYLENE GLYCOL 3350 17 G/17G
17 POWDER, FOR SOLUTION ORAL AT BEDTIME
Refills: 0 | Status: DISCONTINUED | OUTPATIENT
Start: 2025-08-19 | End: 2025-09-03

## 2025-08-19 RX ORDER — HYDROMORPHONE/SOD CHLOR,ISO/PF 2 MG/10 ML
0.5 SYRINGE (ML) INJECTION EVERY 4 HOURS
Refills: 0 | Status: DISCONTINUED | OUTPATIENT
Start: 2025-08-19 | End: 2025-08-20

## 2025-08-19 RX ORDER — SENNA 187 MG
2 TABLET ORAL AT BEDTIME
Refills: 0 | Status: DISCONTINUED | OUTPATIENT
Start: 2025-08-19 | End: 2025-09-03

## 2025-08-19 RX ORDER — FERROUS SULFATE 137(45) MG
325 TABLET, EXTENDED RELEASE ORAL DAILY
Refills: 0 | Status: DISCONTINUED | OUTPATIENT
Start: 2025-08-20 | End: 2025-08-26

## 2025-08-19 RX ORDER — LAMIVUDINE 10 MG/ML
300 SOLUTION ORAL DAILY
Refills: 0 | Status: DISCONTINUED | OUTPATIENT
Start: 2025-08-19 | End: 2025-09-03

## 2025-08-19 RX ORDER — ACETAMINOPHEN 500 MG/5ML
1000 LIQUID (ML) ORAL EVERY 8 HOURS
Refills: 0 | Status: DISCONTINUED | OUTPATIENT
Start: 2025-08-19 | End: 2025-08-26

## 2025-08-19 RX ORDER — TRAMADOL HYDROCHLORIDE 50 MG/1
50 TABLET, FILM COATED ORAL EVERY 4 HOURS
Refills: 0 | Status: DISCONTINUED | OUTPATIENT
Start: 2025-08-19 | End: 2025-08-25

## 2025-08-19 RX ORDER — ACETAMINOPHEN 500 MG/5ML
1000 LIQUID (ML) ORAL ONCE
Refills: 0 | Status: COMPLETED | OUTPATIENT
Start: 2025-08-19 | End: 2025-08-19

## 2025-08-19 RX ORDER — ONDANSETRON HCL/PF 4 MG/2 ML
4 VIAL (ML) INJECTION EVERY 6 HOURS
Refills: 0 | Status: DISCONTINUED | OUTPATIENT
Start: 2025-08-19 | End: 2025-09-03

## 2025-08-19 RX ORDER — APREPITANT 40 MG/1
40 CAPSULE ORAL ONCE
Refills: 0 | Status: COMPLETED | OUTPATIENT
Start: 2025-08-19 | End: 2025-08-19

## 2025-08-19 RX ORDER — TRAMADOL HYDROCHLORIDE 50 MG/1
25 TABLET, FILM COATED ORAL EVERY 4 HOURS
Refills: 0 | Status: DISCONTINUED | OUTPATIENT
Start: 2025-08-19 | End: 2025-08-25

## 2025-08-19 RX ORDER — MAGNESIUM HYDROXIDE 400 MG/5ML
30 SUSPENSION ORAL DAILY
Refills: 0 | Status: DISCONTINUED | OUTPATIENT
Start: 2025-08-19 | End: 2025-09-03

## 2025-08-19 RX ORDER — FINASTERIDE 1 MG/1
5 TABLET, FILM COATED ORAL AT BEDTIME
Refills: 0 | Status: DISCONTINUED | OUTPATIENT
Start: 2025-08-19 | End: 2025-09-03

## 2025-08-19 RX ORDER — FLUOXETINE HYDROCHLORIDE 20 MG/1
10 CAPSULE ORAL DAILY
Refills: 0 | Status: DISCONTINUED | OUTPATIENT
Start: 2025-08-20 | End: 2025-09-03

## 2025-08-19 RX ORDER — DOLUTEGRAVIR SODIUM 5 MG/1
50 TABLET, FOR SUSPENSION ORAL DAILY
Refills: 0 | Status: DISCONTINUED | OUTPATIENT
Start: 2025-08-19 | End: 2025-09-03

## 2025-08-19 RX ORDER — TAMSULOSIN HYDROCHLORIDE 0.4 MG/1
0.4 CAPSULE ORAL AT BEDTIME
Refills: 0 | Status: DISCONTINUED | OUTPATIENT
Start: 2025-08-19 | End: 2025-09-03

## 2025-08-19 RX ORDER — SODIUM CHLORIDE 9 G/1000ML
1000 INJECTION, SOLUTION INTRAVENOUS
Refills: 0 | Status: DISCONTINUED | OUTPATIENT
Start: 2025-08-19 | End: 2025-08-25

## 2025-08-19 RX ORDER — CEFAZOLIN SODIUM IN 0.9 % NACL 3 G/100 ML
2000 INTRAVENOUS SOLUTION, PIGGYBACK (ML) INTRAVENOUS EVERY 8 HOURS
Refills: 0 | Status: COMPLETED | OUTPATIENT
Start: 2025-08-20 | End: 2025-08-20

## 2025-08-19 RX ADMIN — Medication 1000 MILLIGRAM(S): at 12:47

## 2025-08-19 RX ADMIN — Medication 1000 MILLIGRAM(S): at 12:49

## 2025-08-19 RX ADMIN — Medication 0.5 MILLIGRAM(S): at 23:07

## 2025-08-19 RX ADMIN — TRAMADOL HYDROCHLORIDE 50 MILLIGRAM(S): 50 TABLET, FILM COATED ORAL at 23:10

## 2025-08-19 RX ADMIN — Medication 1 APPLICATION(S): at 12:49

## 2025-08-19 RX ADMIN — Medication 0.5 MILLIGRAM(S): at 22:52

## 2025-08-19 RX ADMIN — APREPITANT 40 MILLIGRAM(S): 40 CAPSULE ORAL at 12:48

## 2025-08-20 LAB
4/8 RATIO: 1.13 RATIO — SIGNIFICANT CHANGE UP (ref 0.9–3.6)
ABS CD8: 438 CELLS/UL — SIGNIFICANT CHANGE UP (ref 142–740)
ANION GAP SERPL CALC-SCNC: 8 MMOL/L — SIGNIFICANT CHANGE UP (ref 5–17)
BUN SERPL-MCNC: 14 MG/DL — SIGNIFICANT CHANGE UP (ref 7–23)
CALCIUM SERPL-MCNC: 8.8 MG/DL — SIGNIFICANT CHANGE UP (ref 8.4–10.5)
CD16+CD56+ CELLS NFR BLD: 25 % — HIGH (ref 5–23)
CD16+CD56+ CELLS NFR SPEC: 348 CELLS/UL — SIGNIFICANT CHANGE UP (ref 71–410)
CD19 BLASTS SPEC-ACNC: 5 % — LOW (ref 6–24)
CD19 BLASTS SPEC-ACNC: 78 CELLS/UL — LOW (ref 84–469)
CD3 BLASTS SPEC-ACNC: 69 % — SIGNIFICANT CHANGE UP (ref 59–83)
CD3 BLASTS SPEC-ACNC: 994 CELLS/UL — SIGNIFICANT CHANGE UP (ref 672–1870)
CD4 %: 34 % — SIGNIFICANT CHANGE UP (ref 30–62)
CD8 %: 30 % — SIGNIFICANT CHANGE UP (ref 12–36)
CHLORIDE SERPL-SCNC: 104 MMOL/L — SIGNIFICANT CHANGE UP (ref 96–108)
CO2 SERPL-SCNC: 26 MMOL/L — SIGNIFICANT CHANGE UP (ref 22–31)
CREAT SERPL-MCNC: 1.38 MG/DL — HIGH (ref 0.5–1.3)
EGFR: 57 ML/MIN/1.73M2 — LOW
EGFR: 57 ML/MIN/1.73M2 — LOW
GLUCOSE SERPL-MCNC: 116 MG/DL — HIGH (ref 70–99)
HCT VFR BLD CALC: 34.3 % — LOW (ref 39–50)
HGB BLD-MCNC: 11.1 G/DL — LOW (ref 13–17)
MCHC RBC-ENTMCNC: 30.7 PG — SIGNIFICANT CHANGE UP (ref 27–34)
MCHC RBC-ENTMCNC: 32.4 G/DL — SIGNIFICANT CHANGE UP (ref 32–36)
MCV RBC AUTO: 95 FL — SIGNIFICANT CHANGE UP (ref 80–100)
NRBC # BLD AUTO: 0 K/UL — SIGNIFICANT CHANGE UP (ref 0–0)
NRBC # FLD: 0 K/UL — SIGNIFICANT CHANGE UP (ref 0–0)
NRBC BLD AUTO-RTO: 0 /100 WBCS — SIGNIFICANT CHANGE UP (ref 0–0)
PLATELET # BLD AUTO: 142 K/UL — LOW (ref 150–400)
PMV BLD: 10.8 FL — SIGNIFICANT CHANGE UP (ref 7–13)
POTASSIUM SERPL-MCNC: 4.4 MMOL/L — SIGNIFICANT CHANGE UP (ref 3.5–5.3)
POTASSIUM SERPL-SCNC: 4.4 MMOL/L — SIGNIFICANT CHANGE UP (ref 3.5–5.3)
RBC # BLD: 3.61 M/UL — LOW (ref 4.2–5.8)
RBC # FLD: 11 % — SIGNIFICANT CHANGE UP (ref 10.3–14.5)
SODIUM SERPL-SCNC: 138 MMOL/L — SIGNIFICANT CHANGE UP (ref 135–145)
T-CELL CD4 SUBSET PNL BLD: 495 CELLS/UL — SIGNIFICANT CHANGE UP (ref 489–1457)
VIABLE CELLS NFR SPEC: SIGNIFICANT CHANGE UP
WBC # BLD: 9.02 K/UL — SIGNIFICANT CHANGE UP (ref 3.8–10.5)
WBC # FLD AUTO: 9.02 K/UL — SIGNIFICANT CHANGE UP (ref 3.8–10.5)

## 2025-08-20 PROCEDURE — 86360 T CELL ABSOLUTE COUNT/RATIO: CPT

## 2025-08-20 PROCEDURE — 86355 B CELLS TOTAL COUNT: CPT

## 2025-08-20 PROCEDURE — 87116 MYCOBACTERIA CULTURE: CPT

## 2025-08-20 PROCEDURE — 86900 BLOOD TYPING SEROLOGIC ABO: CPT

## 2025-08-20 PROCEDURE — 84295 ASSAY OF SERUM SODIUM: CPT

## 2025-08-20 PROCEDURE — 86357 NK CELLS TOTAL COUNT: CPT

## 2025-08-20 PROCEDURE — 84132 ASSAY OF SERUM POTASSIUM: CPT

## 2025-08-20 PROCEDURE — 36415 COLL VENOUS BLD VENIPUNCTURE: CPT

## 2025-08-20 PROCEDURE — 82330 ASSAY OF CALCIUM: CPT

## 2025-08-20 PROCEDURE — 87075 CULTR BACTERIA EXCEPT BLOOD: CPT

## 2025-08-20 PROCEDURE — 82805 BLOOD GASES W/O2 SATURATION: CPT

## 2025-08-20 PROCEDURE — 80048 BASIC METABOLIC PNL TOTAL CA: CPT

## 2025-08-20 PROCEDURE — 87102 FUNGUS ISOLATION CULTURE: CPT

## 2025-08-20 PROCEDURE — 99223 1ST HOSP IP/OBS HIGH 75: CPT

## 2025-08-20 PROCEDURE — 87070 CULTURE OTHR SPECIMN AEROBIC: CPT

## 2025-08-20 PROCEDURE — 86359 T CELLS TOTAL COUNT: CPT

## 2025-08-20 PROCEDURE — 85027 COMPLETE CBC AUTOMATED: CPT

## 2025-08-20 PROCEDURE — 86901 BLOOD TYPING SEROLOGIC RH(D): CPT

## 2025-08-20 PROCEDURE — 82947 ASSAY GLUCOSE BLOOD QUANT: CPT

## 2025-08-20 PROCEDURE — 87205 SMEAR GRAM STAIN: CPT

## 2025-08-20 PROCEDURE — 86850 RBC ANTIBODY SCREEN: CPT

## 2025-08-20 RX ORDER — ACETAMINOPHEN 500 MG/5ML
1000 LIQUID (ML) ORAL ONCE
Refills: 0 | Status: COMPLETED | OUTPATIENT
Start: 2025-08-20 | End: 2025-08-20

## 2025-08-20 RX ORDER — CYCLOBENZAPRINE HYDROCHLORIDE 15 MG/1
5 CAPSULE, EXTENDED RELEASE ORAL EVERY 8 HOURS
Refills: 0 | Status: DISCONTINUED | OUTPATIENT
Start: 2025-08-20 | End: 2025-09-03

## 2025-08-20 RX ORDER — GABAPENTIN 400 MG/1
300 CAPSULE ORAL EVERY 6 HOURS
Refills: 0 | Status: DISCONTINUED | OUTPATIENT
Start: 2025-08-20 | End: 2025-08-26

## 2025-08-20 RX ORDER — HYDROMORPHONE/SOD CHLOR,ISO/PF 2 MG/10 ML
0.5 SYRINGE (ML) INJECTION EVERY 6 HOURS
Refills: 0 | Status: DISCONTINUED | OUTPATIENT
Start: 2025-08-20 | End: 2025-08-22

## 2025-08-20 RX ADMIN — TRAMADOL HYDROCHLORIDE 50 MILLIGRAM(S): 50 TABLET, FILM COATED ORAL at 22:18

## 2025-08-20 RX ADMIN — Medication 2 TABLET(S): at 21:18

## 2025-08-20 RX ADMIN — Medication 0.5 MILLIGRAM(S): at 06:49

## 2025-08-20 RX ADMIN — Medication 0.5 MILLIGRAM(S): at 07:10

## 2025-08-20 RX ADMIN — TRAMADOL HYDROCHLORIDE 50 MILLIGRAM(S): 50 TABLET, FILM COATED ORAL at 05:05

## 2025-08-20 RX ADMIN — Medication 0.5 MILLIGRAM(S): at 12:05

## 2025-08-20 RX ADMIN — Medication 81 MILLIGRAM(S): at 18:28

## 2025-08-20 RX ADMIN — Medication 400 MILLIGRAM(S): at 12:52

## 2025-08-20 RX ADMIN — TRAMADOL HYDROCHLORIDE 50 MILLIGRAM(S): 50 TABLET, FILM COATED ORAL at 17:04

## 2025-08-20 RX ADMIN — Medication 1000 MILLIGRAM(S): at 05:05

## 2025-08-20 RX ADMIN — Medication 100 MILLIGRAM(S): at 10:04

## 2025-08-20 RX ADMIN — TAMSULOSIN HYDROCHLORIDE 0.4 MILLIGRAM(S): 0.4 CAPSULE ORAL at 21:18

## 2025-08-20 RX ADMIN — SODIUM CHLORIDE 75 MILLILITER(S): 9 INJECTION, SOLUTION INTRAVENOUS at 12:52

## 2025-08-20 RX ADMIN — Medication 0.5 MILLIGRAM(S): at 15:23

## 2025-08-20 RX ADMIN — Medication 20 MILLIGRAM(S): at 11:07

## 2025-08-20 RX ADMIN — FINASTERIDE 5 MILLIGRAM(S): 1 TABLET, FILM COATED ORAL at 21:19

## 2025-08-20 RX ADMIN — SODIUM CHLORIDE 75 MILLILITER(S): 9 INJECTION, SOLUTION INTRAVENOUS at 11:07

## 2025-08-20 RX ADMIN — SODIUM CHLORIDE 75 MILLILITER(S): 9 INJECTION, SOLUTION INTRAVENOUS at 01:11

## 2025-08-20 RX ADMIN — Medication 400 MILLIGRAM(S): at 06:22

## 2025-08-20 RX ADMIN — TRAMADOL HYDROCHLORIDE 50 MILLIGRAM(S): 50 TABLET, FILM COATED ORAL at 21:21

## 2025-08-20 RX ADMIN — CYCLOBENZAPRINE HYDROCHLORIDE 5 MILLIGRAM(S): 15 CAPSULE, EXTENDED RELEASE ORAL at 21:18

## 2025-08-20 RX ADMIN — Medication 325 MILLIGRAM(S): at 11:07

## 2025-08-20 RX ADMIN — Medication 0.5 MILLIGRAM(S): at 10:04

## 2025-08-20 RX ADMIN — Medication 0.5 MILLIGRAM(S): at 03:30

## 2025-08-20 RX ADMIN — Medication 0.5 MILLIGRAM(S): at 02:51

## 2025-08-20 RX ADMIN — Medication 1000 MILLIGRAM(S): at 21:18

## 2025-08-20 RX ADMIN — TRAMADOL HYDROCHLORIDE 50 MILLIGRAM(S): 50 TABLET, FILM COATED ORAL at 06:00

## 2025-08-20 RX ADMIN — GABAPENTIN 300 MILLIGRAM(S): 400 CAPSULE ORAL at 21:18

## 2025-08-20 RX ADMIN — POLYETHYLENE GLYCOL 3350 17 GRAM(S): 17 POWDER, FOR SOLUTION ORAL at 21:18

## 2025-08-20 RX ADMIN — TRAMADOL HYDROCHLORIDE 50 MILLIGRAM(S): 50 TABLET, FILM COATED ORAL at 11:07

## 2025-08-20 RX ADMIN — Medication 100 MILLIGRAM(S): at 01:11

## 2025-08-20 RX ADMIN — TRAMADOL HYDROCHLORIDE 50 MILLIGRAM(S): 50 TABLET, FILM COATED ORAL at 00:52

## 2025-08-20 RX ADMIN — Medication 81 MILLIGRAM(S): at 05:07

## 2025-08-21 LAB
ANION GAP SERPL CALC-SCNC: 8 MMOL/L — SIGNIFICANT CHANGE UP (ref 5–17)
BUN SERPL-MCNC: 13 MG/DL — SIGNIFICANT CHANGE UP (ref 7–23)
CALCIUM SERPL-MCNC: 8.7 MG/DL — SIGNIFICANT CHANGE UP (ref 8.4–10.5)
CHLORIDE SERPL-SCNC: 99 MMOL/L — SIGNIFICANT CHANGE UP (ref 96–108)
CO2 SERPL-SCNC: 27 MMOL/L — SIGNIFICANT CHANGE UP (ref 22–31)
CREAT SERPL-MCNC: 1.19 MG/DL — SIGNIFICANT CHANGE UP (ref 0.5–1.3)
EGFR: 68 ML/MIN/1.73M2 — SIGNIFICANT CHANGE UP
EGFR: 68 ML/MIN/1.73M2 — SIGNIFICANT CHANGE UP
GLUCOSE SERPL-MCNC: 112 MG/DL — HIGH (ref 70–99)
HCT VFR BLD CALC: 33.9 % — LOW (ref 39–50)
HGB BLD-MCNC: 11 G/DL — LOW (ref 13–17)
MCHC RBC-ENTMCNC: 31 PG — SIGNIFICANT CHANGE UP (ref 27–34)
MCHC RBC-ENTMCNC: 32.4 G/DL — SIGNIFICANT CHANGE UP (ref 32–36)
MCV RBC AUTO: 95.5 FL — SIGNIFICANT CHANGE UP (ref 80–100)
NRBC # BLD AUTO: 0 K/UL — SIGNIFICANT CHANGE UP (ref 0–0)
NRBC # FLD: 0 K/UL — SIGNIFICANT CHANGE UP (ref 0–0)
NRBC BLD AUTO-RTO: 0 /100 WBCS — SIGNIFICANT CHANGE UP (ref 0–0)
PLATELET # BLD AUTO: 113 K/UL — LOW (ref 150–400)
PMV BLD: 10.9 FL — SIGNIFICANT CHANGE UP (ref 7–13)
POTASSIUM SERPL-MCNC: 4.2 MMOL/L — SIGNIFICANT CHANGE UP (ref 3.5–5.3)
POTASSIUM SERPL-SCNC: 4.2 MMOL/L — SIGNIFICANT CHANGE UP (ref 3.5–5.3)
RBC # BLD: 3.55 M/UL — LOW (ref 4.2–5.8)
RBC # FLD: 11 % — SIGNIFICANT CHANGE UP (ref 10.3–14.5)
SODIUM SERPL-SCNC: 134 MMOL/L — LOW (ref 135–145)
WBC # BLD: 7.86 K/UL — SIGNIFICANT CHANGE UP (ref 3.8–10.5)
WBC # FLD AUTO: 7.86 K/UL — SIGNIFICANT CHANGE UP (ref 3.8–10.5)

## 2025-08-21 PROCEDURE — 99233 SBSQ HOSP IP/OBS HIGH 50: CPT

## 2025-08-21 PROCEDURE — 84295 ASSAY OF SERUM SODIUM: CPT

## 2025-08-21 PROCEDURE — 36415 COLL VENOUS BLD VENIPUNCTURE: CPT

## 2025-08-21 PROCEDURE — 84132 ASSAY OF SERUM POTASSIUM: CPT

## 2025-08-21 PROCEDURE — 86357 NK CELLS TOTAL COUNT: CPT

## 2025-08-21 PROCEDURE — 87116 MYCOBACTERIA CULTURE: CPT

## 2025-08-21 PROCEDURE — 76000 FLUOROSCOPY <1 HR PHYS/QHP: CPT

## 2025-08-21 PROCEDURE — 82805 BLOOD GASES W/O2 SATURATION: CPT

## 2025-08-21 PROCEDURE — 86850 RBC ANTIBODY SCREEN: CPT

## 2025-08-21 PROCEDURE — 87205 SMEAR GRAM STAIN: CPT

## 2025-08-21 PROCEDURE — 86900 BLOOD TYPING SEROLOGIC ABO: CPT

## 2025-08-21 PROCEDURE — 86355 B CELLS TOTAL COUNT: CPT

## 2025-08-21 PROCEDURE — 87102 FUNGUS ISOLATION CULTURE: CPT

## 2025-08-21 PROCEDURE — 86359 T CELLS TOTAL COUNT: CPT

## 2025-08-21 PROCEDURE — 82330 ASSAY OF CALCIUM: CPT

## 2025-08-21 PROCEDURE — 82947 ASSAY GLUCOSE BLOOD QUANT: CPT

## 2025-08-21 PROCEDURE — 87075 CULTR BACTERIA EXCEPT BLOOD: CPT

## 2025-08-21 PROCEDURE — 85027 COMPLETE CBC AUTOMATED: CPT

## 2025-08-21 PROCEDURE — 80048 BASIC METABOLIC PNL TOTAL CA: CPT

## 2025-08-21 PROCEDURE — 86360 T CELL ABSOLUTE COUNT/RATIO: CPT

## 2025-08-21 PROCEDURE — 86901 BLOOD TYPING SEROLOGIC RH(D): CPT

## 2025-08-21 PROCEDURE — 87070 CULTURE OTHR SPECIMN AEROBIC: CPT

## 2025-08-21 RX ORDER — MAGNESIUM CITRATE
296 SOLUTION, ORAL ORAL EVERY 24 HOURS
Refills: 0 | Status: COMPLETED | OUTPATIENT
Start: 2025-08-21 | End: 2025-08-24

## 2025-08-21 RX ORDER — BISACODYL 5 MG
5 TABLET, DELAYED RELEASE (ENTERIC COATED) ORAL EVERY 12 HOURS
Refills: 0 | Status: DISCONTINUED | OUTPATIENT
Start: 2025-08-21 | End: 2025-09-03

## 2025-08-21 RX ADMIN — POLYETHYLENE GLYCOL 3350 17 GRAM(S): 17 POWDER, FOR SOLUTION ORAL at 21:59

## 2025-08-21 RX ADMIN — Medication 0.5 MILLIGRAM(S): at 13:03

## 2025-08-21 RX ADMIN — FLUOXETINE HYDROCHLORIDE 10 MILLIGRAM(S): 20 CAPSULE ORAL at 12:23

## 2025-08-21 RX ADMIN — Medication 1000 MILLIGRAM(S): at 05:51

## 2025-08-21 RX ADMIN — GABAPENTIN 300 MILLIGRAM(S): 400 CAPSULE ORAL at 05:51

## 2025-08-21 RX ADMIN — TRAMADOL HYDROCHLORIDE 50 MILLIGRAM(S): 50 TABLET, FILM COATED ORAL at 18:07

## 2025-08-21 RX ADMIN — Medication 2 TABLET(S): at 21:59

## 2025-08-21 RX ADMIN — Medication 0.5 MILLIGRAM(S): at 12:23

## 2025-08-21 RX ADMIN — GABAPENTIN 300 MILLIGRAM(S): 400 CAPSULE ORAL at 15:19

## 2025-08-21 RX ADMIN — Medication 20 MILLIGRAM(S): at 12:23

## 2025-08-21 RX ADMIN — TRAMADOL HYDROCHLORIDE 50 MILLIGRAM(S): 50 TABLET, FILM COATED ORAL at 09:12

## 2025-08-21 RX ADMIN — FINASTERIDE 5 MILLIGRAM(S): 1 TABLET, FILM COATED ORAL at 22:00

## 2025-08-21 RX ADMIN — Medication 1000 MILLIGRAM(S): at 15:19

## 2025-08-21 RX ADMIN — Medication 81 MILLIGRAM(S): at 19:13

## 2025-08-21 RX ADMIN — Medication 81 MILLIGRAM(S): at 05:51

## 2025-08-21 RX ADMIN — DOLUTEGRAVIR SODIUM 50 MILLIGRAM(S): 5 TABLET, FOR SUSPENSION ORAL at 12:22

## 2025-08-21 RX ADMIN — GABAPENTIN 300 MILLIGRAM(S): 400 CAPSULE ORAL at 21:59

## 2025-08-21 RX ADMIN — CYCLOBENZAPRINE HYDROCHLORIDE 5 MILLIGRAM(S): 15 CAPSULE, EXTENDED RELEASE ORAL at 05:50

## 2025-08-21 RX ADMIN — Medication 1000 MILLIGRAM(S): at 18:07

## 2025-08-21 RX ADMIN — Medication 0.5 MILLIGRAM(S): at 18:07

## 2025-08-21 RX ADMIN — CYCLOBENZAPRINE HYDROCHLORIDE 5 MILLIGRAM(S): 15 CAPSULE, EXTENDED RELEASE ORAL at 21:58

## 2025-08-21 RX ADMIN — TAMSULOSIN HYDROCHLORIDE 0.4 MILLIGRAM(S): 0.4 CAPSULE ORAL at 21:59

## 2025-08-21 RX ADMIN — Medication 325 MILLIGRAM(S): at 12:23

## 2025-08-21 RX ADMIN — GABAPENTIN 300 MILLIGRAM(S): 400 CAPSULE ORAL at 09:12

## 2025-08-21 RX ADMIN — Medication 5 MILLIGRAM(S): at 21:59

## 2025-08-21 RX ADMIN — CYCLOBENZAPRINE HYDROCHLORIDE 5 MILLIGRAM(S): 15 CAPSULE, EXTENDED RELEASE ORAL at 15:19

## 2025-08-21 RX ADMIN — Medication 1000 MILLIGRAM(S): at 21:58

## 2025-08-21 RX ADMIN — Medication 1000 MILLIGRAM(S): at 15:59

## 2025-08-22 LAB
ANION GAP SERPL CALC-SCNC: 11 MMOL/L — SIGNIFICANT CHANGE UP (ref 5–17)
BLD GP AB SCN SERPL QL: NEGATIVE — SIGNIFICANT CHANGE UP
BUN SERPL-MCNC: 13 MG/DL — SIGNIFICANT CHANGE UP (ref 7–23)
CALCIUM SERPL-MCNC: 9 MG/DL — SIGNIFICANT CHANGE UP (ref 8.4–10.5)
CHLORIDE SERPL-SCNC: 101 MMOL/L — SIGNIFICANT CHANGE UP (ref 96–108)
CO2 SERPL-SCNC: 27 MMOL/L — SIGNIFICANT CHANGE UP (ref 22–31)
CREAT SERPL-MCNC: 1.38 MG/DL — HIGH (ref 0.5–1.3)
D DIMER BLD IA.RAPID-MCNC: 2597 NG/ML DDU — HIGH
EGFR: 57 ML/MIN/1.73M2 — LOW
EGFR: 57 ML/MIN/1.73M2 — LOW
GLUCOSE SERPL-MCNC: 117 MG/DL — HIGH (ref 70–99)
HCT VFR BLD CALC: 33.6 % — LOW (ref 39–50)
HGB BLD-MCNC: 10.8 G/DL — LOW (ref 13–17)
MCHC RBC-ENTMCNC: 30.9 PG — SIGNIFICANT CHANGE UP (ref 27–34)
MCHC RBC-ENTMCNC: 32.1 G/DL — SIGNIFICANT CHANGE UP (ref 32–36)
MCV RBC AUTO: 96 FL — SIGNIFICANT CHANGE UP (ref 80–100)
NRBC # BLD AUTO: 0 K/UL — SIGNIFICANT CHANGE UP (ref 0–0)
NRBC # FLD: 0 K/UL — SIGNIFICANT CHANGE UP (ref 0–0)
NRBC BLD AUTO-RTO: 0 /100 WBCS — SIGNIFICANT CHANGE UP (ref 0–0)
PLATELET # BLD AUTO: 127 K/UL — LOW (ref 150–400)
PMV BLD: 11.1 FL — SIGNIFICANT CHANGE UP (ref 7–13)
POTASSIUM SERPL-MCNC: 4 MMOL/L — SIGNIFICANT CHANGE UP (ref 3.5–5.3)
POTASSIUM SERPL-SCNC: 4 MMOL/L — SIGNIFICANT CHANGE UP (ref 3.5–5.3)
RBC # BLD: 3.5 M/UL — LOW (ref 4.2–5.8)
RBC # FLD: 11.4 % — SIGNIFICANT CHANGE UP (ref 10.3–14.5)
RH IG SCN BLD-IMP: POSITIVE — SIGNIFICANT CHANGE UP
SODIUM SERPL-SCNC: 139 MMOL/L — SIGNIFICANT CHANGE UP (ref 135–145)
TROPONIN T, HIGH SENSITIVITY RESULT: 13 NG/L — SIGNIFICANT CHANGE UP
WBC # BLD: 9.19 K/UL — SIGNIFICANT CHANGE UP (ref 3.8–10.5)
WBC # FLD AUTO: 9.19 K/UL — SIGNIFICANT CHANGE UP (ref 3.8–10.5)

## 2025-08-22 PROCEDURE — 85027 COMPLETE CBC AUTOMATED: CPT

## 2025-08-22 PROCEDURE — 87075 CULTR BACTERIA EXCEPT BLOOD: CPT

## 2025-08-22 PROCEDURE — 87116 MYCOBACTERIA CULTURE: CPT

## 2025-08-22 PROCEDURE — 86357 NK CELLS TOTAL COUNT: CPT

## 2025-08-22 PROCEDURE — 93970 EXTREMITY STUDY: CPT | Mod: 26

## 2025-08-22 PROCEDURE — 85379 FIBRIN DEGRADATION QUANT: CPT

## 2025-08-22 PROCEDURE — 86355 B CELLS TOTAL COUNT: CPT

## 2025-08-22 PROCEDURE — 84132 ASSAY OF SERUM POTASSIUM: CPT

## 2025-08-22 PROCEDURE — 86900 BLOOD TYPING SEROLOGIC ABO: CPT

## 2025-08-22 PROCEDURE — 93010 ELECTROCARDIOGRAM REPORT: CPT

## 2025-08-22 PROCEDURE — 84484 ASSAY OF TROPONIN QUANT: CPT

## 2025-08-22 PROCEDURE — 99233 SBSQ HOSP IP/OBS HIGH 50: CPT

## 2025-08-22 PROCEDURE — 86360 T CELL ABSOLUTE COUNT/RATIO: CPT

## 2025-08-22 PROCEDURE — 87070 CULTURE OTHR SPECIMN AEROBIC: CPT

## 2025-08-22 PROCEDURE — 82947 ASSAY GLUCOSE BLOOD QUANT: CPT

## 2025-08-22 PROCEDURE — 72170 X-RAY EXAM OF PELVIS: CPT

## 2025-08-22 PROCEDURE — 86359 T CELLS TOTAL COUNT: CPT

## 2025-08-22 PROCEDURE — 71045 X-RAY EXAM CHEST 1 VIEW: CPT | Mod: 26

## 2025-08-22 PROCEDURE — 36415 COLL VENOUS BLD VENIPUNCTURE: CPT

## 2025-08-22 PROCEDURE — 82805 BLOOD GASES W/O2 SATURATION: CPT

## 2025-08-22 PROCEDURE — 86901 BLOOD TYPING SEROLOGIC RH(D): CPT

## 2025-08-22 PROCEDURE — 71275 CT ANGIOGRAPHY CHEST: CPT | Mod: 26

## 2025-08-22 PROCEDURE — 84295 ASSAY OF SERUM SODIUM: CPT

## 2025-08-22 PROCEDURE — 87102 FUNGUS ISOLATION CULTURE: CPT

## 2025-08-22 PROCEDURE — 76000 FLUOROSCOPY <1 HR PHYS/QHP: CPT

## 2025-08-22 PROCEDURE — 82330 ASSAY OF CALCIUM: CPT

## 2025-08-22 PROCEDURE — 86850 RBC ANTIBODY SCREEN: CPT

## 2025-08-22 PROCEDURE — 87205 SMEAR GRAM STAIN: CPT

## 2025-08-22 PROCEDURE — 80048 BASIC METABOLIC PNL TOTAL CA: CPT

## 2025-08-22 PROCEDURE — 83880 ASSAY OF NATRIURETIC PEPTIDE: CPT

## 2025-08-22 RX ORDER — TRAMADOL HYDROCHLORIDE 50 MG/1
1 TABLET, FILM COATED ORAL
Qty: 0 | Refills: 0 | DISCHARGE
Start: 2025-08-22

## 2025-08-22 RX ORDER — ASPIRIN 325 MG
1 TABLET ORAL
Qty: 60 | Refills: 0
Start: 2025-08-22 | End: 2025-09-20

## 2025-08-22 RX ORDER — POLYETHYLENE GLYCOL 3350 17 G/17G
17 POWDER, FOR SOLUTION ORAL
Qty: 0 | Refills: 0 | DISCHARGE
Start: 2025-08-22

## 2025-08-22 RX ORDER — NALOXONE HYDROCHLORIDE 0.4 MG/ML
1 INJECTION, SOLUTION INTRAMUSCULAR; INTRAVENOUS; SUBCUTANEOUS
Qty: 1 | Refills: 0
Start: 2025-08-22

## 2025-08-22 RX ORDER — ACETAMINOPHEN 500 MG/5ML
2 LIQUID (ML) ORAL
Qty: 0 | Refills: 0 | DISCHARGE
Start: 2025-08-22

## 2025-08-22 RX ORDER — HYDROMORPHONE/SOD CHLOR,ISO/PF 2 MG/10 ML
0.5 SYRINGE (ML) INJECTION EVERY 8 HOURS
Refills: 0 | Status: DISCONTINUED | OUTPATIENT
Start: 2025-08-22 | End: 2025-08-25

## 2025-08-22 RX ORDER — CYCLOBENZAPRINE HYDROCHLORIDE 15 MG/1
1 CAPSULE, EXTENDED RELEASE ORAL
Qty: 15 | Refills: 0
Start: 2025-08-22 | End: 2025-08-26

## 2025-08-22 RX ORDER — ENOXAPARIN SODIUM 100 MG/ML
90 INJECTION SUBCUTANEOUS EVERY 12 HOURS
Refills: 0 | Status: DISCONTINUED | OUTPATIENT
Start: 2025-08-22 | End: 2025-08-25

## 2025-08-22 RX ORDER — GABAPENTIN 400 MG/1
1 CAPSULE ORAL
Qty: 28 | Refills: 0
Start: 2025-08-22 | End: 2025-08-28

## 2025-08-22 RX ORDER — LAMIVUDINE 10 MG/ML
1 SOLUTION ORAL
Qty: 0 | Refills: 0 | DISCHARGE
Start: 2025-08-22

## 2025-08-22 RX ORDER — TRAMADOL HYDROCHLORIDE 50 MG/1
0.5 TABLET, FILM COATED ORAL
Qty: 0 | Refills: 0 | DISCHARGE
Start: 2025-08-22

## 2025-08-22 RX ORDER — DIAZEPAM 5 MG/1
5 TABLET ORAL ONCE
Refills: 0 | Status: DISCONTINUED | OUTPATIENT
Start: 2025-08-22 | End: 2025-08-22

## 2025-08-22 RX ORDER — BISACODYL 5 MG
10 TABLET, DELAYED RELEASE (ENTERIC COATED) ORAL DAILY
Refills: 0 | Status: DISCONTINUED | OUTPATIENT
Start: 2025-08-22 | End: 2025-09-03

## 2025-08-22 RX ADMIN — GABAPENTIN 300 MILLIGRAM(S): 400 CAPSULE ORAL at 12:20

## 2025-08-22 RX ADMIN — GABAPENTIN 300 MILLIGRAM(S): 400 CAPSULE ORAL at 17:10

## 2025-08-22 RX ADMIN — ENOXAPARIN SODIUM 90 MILLIGRAM(S): 100 INJECTION SUBCUTANEOUS at 16:29

## 2025-08-22 RX ADMIN — CYCLOBENZAPRINE HYDROCHLORIDE 5 MILLIGRAM(S): 15 CAPSULE, EXTENDED RELEASE ORAL at 05:32

## 2025-08-22 RX ADMIN — CYCLOBENZAPRINE HYDROCHLORIDE 5 MILLIGRAM(S): 15 CAPSULE, EXTENDED RELEASE ORAL at 22:05

## 2025-08-22 RX ADMIN — Medication 1000 MILLIGRAM(S): at 22:04

## 2025-08-22 RX ADMIN — TRAMADOL HYDROCHLORIDE 50 MILLIGRAM(S): 50 TABLET, FILM COATED ORAL at 23:00

## 2025-08-22 RX ADMIN — CYCLOBENZAPRINE HYDROCHLORIDE 5 MILLIGRAM(S): 15 CAPSULE, EXTENDED RELEASE ORAL at 12:20

## 2025-08-22 RX ADMIN — LAMIVUDINE 300 MILLIGRAM(S): 10 SOLUTION ORAL at 12:19

## 2025-08-22 RX ADMIN — Medication 325 MILLIGRAM(S): at 12:20

## 2025-08-22 RX ADMIN — DOLUTEGRAVIR SODIUM 50 MILLIGRAM(S): 5 TABLET, FOR SUSPENSION ORAL at 12:19

## 2025-08-22 RX ADMIN — Medication 1000 MILLIGRAM(S): at 05:33

## 2025-08-22 RX ADMIN — FLUOXETINE HYDROCHLORIDE 10 MILLIGRAM(S): 20 CAPSULE ORAL at 12:20

## 2025-08-22 RX ADMIN — Medication 296 MILLILITER(S): at 05:34

## 2025-08-22 RX ADMIN — GABAPENTIN 300 MILLIGRAM(S): 400 CAPSULE ORAL at 22:05

## 2025-08-22 RX ADMIN — TAMSULOSIN HYDROCHLORIDE 0.4 MILLIGRAM(S): 0.4 CAPSULE ORAL at 22:04

## 2025-08-22 RX ADMIN — POLYETHYLENE GLYCOL 3350 17 GRAM(S): 17 POWDER, FOR SOLUTION ORAL at 22:04

## 2025-08-22 RX ADMIN — Medication 1000 MILLIGRAM(S): at 12:20

## 2025-08-22 RX ADMIN — GABAPENTIN 300 MILLIGRAM(S): 400 CAPSULE ORAL at 05:33

## 2025-08-22 RX ADMIN — Medication 81 MILLIGRAM(S): at 05:32

## 2025-08-22 RX ADMIN — DIAZEPAM 5 MILLIGRAM(S): 5 TABLET ORAL at 14:08

## 2025-08-22 RX ADMIN — TRAMADOL HYDROCHLORIDE 50 MILLIGRAM(S): 50 TABLET, FILM COATED ORAL at 22:04

## 2025-08-22 RX ADMIN — Medication 5 MILLIGRAM(S): at 17:10

## 2025-08-22 RX ADMIN — FINASTERIDE 5 MILLIGRAM(S): 1 TABLET, FILM COATED ORAL at 22:05

## 2025-08-22 RX ADMIN — Medication 2 TABLET(S): at 22:04

## 2025-08-22 RX ADMIN — Medication 20 MILLIGRAM(S): at 12:20

## 2025-08-23 LAB
ANION GAP SERPL CALC-SCNC: 5 MMOL/L — SIGNIFICANT CHANGE UP (ref 5–17)
BUN SERPL-MCNC: 16 MG/DL — SIGNIFICANT CHANGE UP (ref 7–23)
CALCIUM SERPL-MCNC: 8.7 MG/DL — SIGNIFICANT CHANGE UP (ref 8.4–10.5)
CHLORIDE SERPL-SCNC: 100 MMOL/L — SIGNIFICANT CHANGE UP (ref 96–108)
CO2 SERPL-SCNC: 30 MMOL/L — SIGNIFICANT CHANGE UP (ref 22–31)
CREAT SERPL-MCNC: 1.47 MG/DL — HIGH (ref 0.5–1.3)
EGFR: 53 ML/MIN/1.73M2 — LOW
EGFR: 53 ML/MIN/1.73M2 — LOW
GLUCOSE SERPL-MCNC: 109 MG/DL — HIGH (ref 70–99)
HCT VFR BLD CALC: 31.8 % — LOW (ref 39–50)
HGB BLD-MCNC: 9.8 G/DL — LOW (ref 13–17)
MCHC RBC-ENTMCNC: 29.7 PG — SIGNIFICANT CHANGE UP (ref 27–34)
MCHC RBC-ENTMCNC: 30.8 G/DL — LOW (ref 32–36)
MCV RBC AUTO: 96.4 FL — SIGNIFICANT CHANGE UP (ref 80–100)
NRBC # BLD AUTO: 0 K/UL — SIGNIFICANT CHANGE UP (ref 0–0)
NRBC # FLD: 0 K/UL — SIGNIFICANT CHANGE UP (ref 0–0)
NRBC BLD AUTO-RTO: 0 /100 WBCS — SIGNIFICANT CHANGE UP (ref 0–0)
PLATELET # BLD AUTO: 133 K/UL — LOW (ref 150–400)
PMV BLD: 11 FL — SIGNIFICANT CHANGE UP (ref 7–13)
POTASSIUM SERPL-MCNC: 4.5 MMOL/L — SIGNIFICANT CHANGE UP (ref 3.5–5.3)
POTASSIUM SERPL-SCNC: 4.5 MMOL/L — SIGNIFICANT CHANGE UP (ref 3.5–5.3)
RBC # BLD: 3.3 M/UL — LOW (ref 4.2–5.8)
RBC # FLD: 11.1 % — SIGNIFICANT CHANGE UP (ref 10.3–14.5)
SODIUM SERPL-SCNC: 135 MMOL/L — SIGNIFICANT CHANGE UP (ref 135–145)
WBC # BLD: 7.69 K/UL — SIGNIFICANT CHANGE UP (ref 3.8–10.5)
WBC # FLD AUTO: 7.69 K/UL — SIGNIFICANT CHANGE UP (ref 3.8–10.5)

## 2025-08-23 PROCEDURE — 82805 BLOOD GASES W/O2 SATURATION: CPT

## 2025-08-23 PROCEDURE — 36415 COLL VENOUS BLD VENIPUNCTURE: CPT

## 2025-08-23 PROCEDURE — 87116 MYCOBACTERIA CULTURE: CPT

## 2025-08-23 PROCEDURE — 99232 SBSQ HOSP IP/OBS MODERATE 35: CPT

## 2025-08-23 PROCEDURE — 93970 EXTREMITY STUDY: CPT

## 2025-08-23 PROCEDURE — 86357 NK CELLS TOTAL COUNT: CPT

## 2025-08-23 PROCEDURE — 93005 ELECTROCARDIOGRAM TRACING: CPT

## 2025-08-23 PROCEDURE — 85027 COMPLETE CBC AUTOMATED: CPT

## 2025-08-23 PROCEDURE — 87075 CULTR BACTERIA EXCEPT BLOOD: CPT

## 2025-08-23 PROCEDURE — 86355 B CELLS TOTAL COUNT: CPT

## 2025-08-23 PROCEDURE — 80048 BASIC METABOLIC PNL TOTAL CA: CPT

## 2025-08-23 PROCEDURE — 87070 CULTURE OTHR SPECIMN AEROBIC: CPT

## 2025-08-23 PROCEDURE — 76000 FLUOROSCOPY <1 HR PHYS/QHP: CPT

## 2025-08-23 PROCEDURE — 84132 ASSAY OF SERUM POTASSIUM: CPT

## 2025-08-23 PROCEDURE — 86360 T CELL ABSOLUTE COUNT/RATIO: CPT

## 2025-08-23 PROCEDURE — 82330 ASSAY OF CALCIUM: CPT

## 2025-08-23 PROCEDURE — 86850 RBC ANTIBODY SCREEN: CPT

## 2025-08-23 PROCEDURE — 86359 T CELLS TOTAL COUNT: CPT

## 2025-08-23 PROCEDURE — 87205 SMEAR GRAM STAIN: CPT

## 2025-08-23 PROCEDURE — 71275 CT ANGIOGRAPHY CHEST: CPT

## 2025-08-23 PROCEDURE — 86901 BLOOD TYPING SEROLOGIC RH(D): CPT

## 2025-08-23 PROCEDURE — 85379 FIBRIN DEGRADATION QUANT: CPT

## 2025-08-23 PROCEDURE — 71045 X-RAY EXAM CHEST 1 VIEW: CPT

## 2025-08-23 PROCEDURE — 86900 BLOOD TYPING SEROLOGIC ABO: CPT

## 2025-08-23 PROCEDURE — 84484 ASSAY OF TROPONIN QUANT: CPT

## 2025-08-23 PROCEDURE — 87102 FUNGUS ISOLATION CULTURE: CPT

## 2025-08-23 PROCEDURE — 83880 ASSAY OF NATRIURETIC PEPTIDE: CPT

## 2025-08-23 PROCEDURE — 82947 ASSAY GLUCOSE BLOOD QUANT: CPT

## 2025-08-23 PROCEDURE — 84295 ASSAY OF SERUM SODIUM: CPT

## 2025-08-23 PROCEDURE — 72170 X-RAY EXAM OF PELVIS: CPT

## 2025-08-23 RX ORDER — ASPIRIN 325 MG
81 TABLET ORAL DAILY
Refills: 0 | Status: DISCONTINUED | OUTPATIENT
Start: 2025-08-23 | End: 2025-08-25

## 2025-08-23 RX ADMIN — Medication 1000 MILLIGRAM(S): at 22:00

## 2025-08-23 RX ADMIN — Medication 325 MILLIGRAM(S): at 11:54

## 2025-08-23 RX ADMIN — LAMIVUDINE 300 MILLIGRAM(S): 10 SOLUTION ORAL at 11:53

## 2025-08-23 RX ADMIN — FLUOXETINE HYDROCHLORIDE 10 MILLIGRAM(S): 20 CAPSULE ORAL at 11:53

## 2025-08-23 RX ADMIN — Medication 1000 MILLIGRAM(S): at 21:24

## 2025-08-23 RX ADMIN — GABAPENTIN 300 MILLIGRAM(S): 400 CAPSULE ORAL at 11:54

## 2025-08-23 RX ADMIN — TRAMADOL HYDROCHLORIDE 50 MILLIGRAM(S): 50 TABLET, FILM COATED ORAL at 18:40

## 2025-08-23 RX ADMIN — TAMSULOSIN HYDROCHLORIDE 0.4 MILLIGRAM(S): 0.4 CAPSULE ORAL at 21:23

## 2025-08-23 RX ADMIN — DOLUTEGRAVIR SODIUM 50 MILLIGRAM(S): 5 TABLET, FOR SUSPENSION ORAL at 11:53

## 2025-08-23 RX ADMIN — TRAMADOL HYDROCHLORIDE 50 MILLIGRAM(S): 50 TABLET, FILM COATED ORAL at 11:30

## 2025-08-23 RX ADMIN — ENOXAPARIN SODIUM 90 MILLIGRAM(S): 100 INJECTION SUBCUTANEOUS at 05:02

## 2025-08-23 RX ADMIN — Medication 1000 MILLIGRAM(S): at 05:54

## 2025-08-23 RX ADMIN — FINASTERIDE 5 MILLIGRAM(S): 1 TABLET, FILM COATED ORAL at 21:23

## 2025-08-23 RX ADMIN — Medication 5 MILLIGRAM(S): at 06:44

## 2025-08-23 RX ADMIN — Medication 0.5 MILLIGRAM(S): at 21:24

## 2025-08-23 RX ADMIN — GABAPENTIN 300 MILLIGRAM(S): 400 CAPSULE ORAL at 23:04

## 2025-08-23 RX ADMIN — Medication 20 MILLIGRAM(S): at 11:54

## 2025-08-23 RX ADMIN — TRAMADOL HYDROCHLORIDE 50 MILLIGRAM(S): 50 TABLET, FILM COATED ORAL at 17:44

## 2025-08-23 RX ADMIN — TRAMADOL HYDROCHLORIDE 50 MILLIGRAM(S): 50 TABLET, FILM COATED ORAL at 10:36

## 2025-08-23 RX ADMIN — GABAPENTIN 300 MILLIGRAM(S): 400 CAPSULE ORAL at 05:55

## 2025-08-23 RX ADMIN — Medication 0.5 MILLIGRAM(S): at 22:00

## 2025-08-23 RX ADMIN — CYCLOBENZAPRINE HYDROCHLORIDE 5 MILLIGRAM(S): 15 CAPSULE, EXTENDED RELEASE ORAL at 05:55

## 2025-08-23 RX ADMIN — Medication 1000 MILLIGRAM(S): at 14:04

## 2025-08-23 RX ADMIN — GABAPENTIN 300 MILLIGRAM(S): 400 CAPSULE ORAL at 17:42

## 2025-08-23 RX ADMIN — CYCLOBENZAPRINE HYDROCHLORIDE 5 MILLIGRAM(S): 15 CAPSULE, EXTENDED RELEASE ORAL at 14:05

## 2025-08-23 RX ADMIN — CYCLOBENZAPRINE HYDROCHLORIDE 5 MILLIGRAM(S): 15 CAPSULE, EXTENDED RELEASE ORAL at 21:23

## 2025-08-23 RX ADMIN — ENOXAPARIN SODIUM 90 MILLIGRAM(S): 100 INJECTION SUBCUTANEOUS at 16:16

## 2025-08-24 LAB
ANION GAP SERPL CALC-SCNC: 5 MMOL/L — SIGNIFICANT CHANGE UP (ref 5–17)
BUN SERPL-MCNC: 20 MG/DL — SIGNIFICANT CHANGE UP (ref 7–23)
CALCIUM SERPL-MCNC: 8.8 MG/DL — SIGNIFICANT CHANGE UP (ref 8.4–10.5)
CHLORIDE SERPL-SCNC: 102 MMOL/L — SIGNIFICANT CHANGE UP (ref 96–108)
CO2 SERPL-SCNC: 30 MMOL/L — SIGNIFICANT CHANGE UP (ref 22–31)
CREAT SERPL-MCNC: 1.51 MG/DL — HIGH (ref 0.5–1.3)
EGFR: 51 ML/MIN/1.73M2 — LOW
EGFR: 51 ML/MIN/1.73M2 — LOW
GLUCOSE SERPL-MCNC: 108 MG/DL — HIGH (ref 70–99)
HCT VFR BLD CALC: 29.1 % — LOW (ref 39–50)
HGB BLD-MCNC: 9.1 G/DL — LOW (ref 13–17)
MCHC RBC-ENTMCNC: 30.4 PG — SIGNIFICANT CHANGE UP (ref 27–34)
MCHC RBC-ENTMCNC: 31.3 G/DL — LOW (ref 32–36)
MCV RBC AUTO: 97.3 FL — SIGNIFICANT CHANGE UP (ref 80–100)
NRBC # BLD AUTO: 0 K/UL — SIGNIFICANT CHANGE UP (ref 0–0)
NRBC # FLD: 0 K/UL — SIGNIFICANT CHANGE UP (ref 0–0)
NRBC BLD AUTO-RTO: 0 /100 WBCS — SIGNIFICANT CHANGE UP (ref 0–0)
PLATELET # BLD AUTO: 161 K/UL — SIGNIFICANT CHANGE UP (ref 150–400)
PMV BLD: 10.6 FL — SIGNIFICANT CHANGE UP (ref 7–13)
POTASSIUM SERPL-MCNC: 4.3 MMOL/L — SIGNIFICANT CHANGE UP (ref 3.5–5.3)
POTASSIUM SERPL-SCNC: 4.3 MMOL/L — SIGNIFICANT CHANGE UP (ref 3.5–5.3)
RBC # BLD: 2.99 M/UL — LOW (ref 4.2–5.8)
RBC # FLD: 11.1 % — SIGNIFICANT CHANGE UP (ref 10.3–14.5)
SODIUM SERPL-SCNC: 137 MMOL/L — SIGNIFICANT CHANGE UP (ref 135–145)
WBC # BLD: 6.2 K/UL — SIGNIFICANT CHANGE UP (ref 3.8–10.5)
WBC # FLD AUTO: 6.2 K/UL — SIGNIFICANT CHANGE UP (ref 3.8–10.5)

## 2025-08-24 PROCEDURE — 99232 SBSQ HOSP IP/OBS MODERATE 35: CPT

## 2025-08-24 RX ADMIN — ENOXAPARIN SODIUM 90 MILLIGRAM(S): 100 INJECTION SUBCUTANEOUS at 16:22

## 2025-08-24 RX ADMIN — FLUOXETINE HYDROCHLORIDE 10 MILLIGRAM(S): 20 CAPSULE ORAL at 11:05

## 2025-08-24 RX ADMIN — GABAPENTIN 300 MILLIGRAM(S): 400 CAPSULE ORAL at 17:22

## 2025-08-24 RX ADMIN — GABAPENTIN 300 MILLIGRAM(S): 400 CAPSULE ORAL at 23:16

## 2025-08-24 RX ADMIN — TRAMADOL HYDROCHLORIDE 50 MILLIGRAM(S): 50 TABLET, FILM COATED ORAL at 11:05

## 2025-08-24 RX ADMIN — TRAMADOL HYDROCHLORIDE 50 MILLIGRAM(S): 50 TABLET, FILM COATED ORAL at 12:00

## 2025-08-24 RX ADMIN — FINASTERIDE 5 MILLIGRAM(S): 1 TABLET, FILM COATED ORAL at 21:39

## 2025-08-24 RX ADMIN — CYCLOBENZAPRINE HYDROCHLORIDE 5 MILLIGRAM(S): 15 CAPSULE, EXTENDED RELEASE ORAL at 14:31

## 2025-08-24 RX ADMIN — Medication 325 MILLIGRAM(S): at 11:06

## 2025-08-24 RX ADMIN — CYCLOBENZAPRINE HYDROCHLORIDE 5 MILLIGRAM(S): 15 CAPSULE, EXTENDED RELEASE ORAL at 05:45

## 2025-08-24 RX ADMIN — Medication 5 MILLIGRAM(S): at 17:22

## 2025-08-24 RX ADMIN — Medication 20 MILLIGRAM(S): at 11:06

## 2025-08-24 RX ADMIN — GABAPENTIN 300 MILLIGRAM(S): 400 CAPSULE ORAL at 11:05

## 2025-08-24 RX ADMIN — Medication 2 TABLET(S): at 21:39

## 2025-08-24 RX ADMIN — DOLUTEGRAVIR SODIUM 50 MILLIGRAM(S): 5 TABLET, FOR SUSPENSION ORAL at 11:06

## 2025-08-24 RX ADMIN — GABAPENTIN 300 MILLIGRAM(S): 400 CAPSULE ORAL at 05:44

## 2025-08-24 RX ADMIN — LAMIVUDINE 300 MILLIGRAM(S): 10 SOLUTION ORAL at 11:06

## 2025-08-24 RX ADMIN — TAMSULOSIN HYDROCHLORIDE 0.4 MILLIGRAM(S): 0.4 CAPSULE ORAL at 21:39

## 2025-08-24 RX ADMIN — Medication 1000 MILLIGRAM(S): at 14:32

## 2025-08-24 RX ADMIN — ENOXAPARIN SODIUM 90 MILLIGRAM(S): 100 INJECTION SUBCUTANEOUS at 05:44

## 2025-08-24 RX ADMIN — Medication 1000 MILLIGRAM(S): at 21:39

## 2025-08-24 RX ADMIN — Medication 81 MILLIGRAM(S): at 11:05

## 2025-08-24 RX ADMIN — Medication 1000 MILLIGRAM(S): at 05:45

## 2025-08-24 RX ADMIN — CYCLOBENZAPRINE HYDROCHLORIDE 5 MILLIGRAM(S): 15 CAPSULE, EXTENDED RELEASE ORAL at 21:39

## 2025-08-25 ENCOUNTER — RESULT REVIEW (OUTPATIENT)
Age: 65
End: 2025-08-25

## 2025-08-25 DIAGNOSIS — I26.99 OTHER PULMONARY EMBOLISM WITHOUT ACUTE COR PULMONALE: ICD-10-CM

## 2025-08-25 LAB
ANION GAP SERPL CALC-SCNC: 7 MMOL/L — SIGNIFICANT CHANGE UP (ref 5–17)
BUN SERPL-MCNC: 18 MG/DL — SIGNIFICANT CHANGE UP (ref 7–23)
CALCIUM SERPL-MCNC: 8.9 MG/DL — SIGNIFICANT CHANGE UP (ref 8.4–10.5)
CHLORIDE SERPL-SCNC: 100 MMOL/L — SIGNIFICANT CHANGE UP (ref 96–108)
CO2 SERPL-SCNC: 29 MMOL/L — SIGNIFICANT CHANGE UP (ref 22–31)
CREAT SERPL-MCNC: 1.47 MG/DL — HIGH (ref 0.5–1.3)
EGFR: 53 ML/MIN/1.73M2 — LOW
EGFR: 53 ML/MIN/1.73M2 — LOW
GLUCOSE SERPL-MCNC: 98 MG/DL — SIGNIFICANT CHANGE UP (ref 70–99)
HCT VFR BLD CALC: 29.1 % — LOW (ref 39–50)
HGB BLD-MCNC: 9.1 G/DL — LOW (ref 13–17)
MCHC RBC-ENTMCNC: 30.1 PG — SIGNIFICANT CHANGE UP (ref 27–34)
MCHC RBC-ENTMCNC: 31.3 G/DL — LOW (ref 32–36)
MCV RBC AUTO: 96.4 FL — SIGNIFICANT CHANGE UP (ref 80–100)
NRBC # BLD AUTO: 0 K/UL — SIGNIFICANT CHANGE UP (ref 0–0)
NRBC # FLD: 0 K/UL — SIGNIFICANT CHANGE UP (ref 0–0)
NRBC BLD AUTO-RTO: 0 /100 WBCS — SIGNIFICANT CHANGE UP (ref 0–0)
PLATELET # BLD AUTO: 175 K/UL — SIGNIFICANT CHANGE UP (ref 150–400)
PMV BLD: 10.5 FL — SIGNIFICANT CHANGE UP (ref 7–13)
POTASSIUM SERPL-MCNC: 4.5 MMOL/L — SIGNIFICANT CHANGE UP (ref 3.5–5.3)
POTASSIUM SERPL-SCNC: 4.5 MMOL/L — SIGNIFICANT CHANGE UP (ref 3.5–5.3)
RBC # BLD: 3.02 M/UL — LOW (ref 4.2–5.8)
RBC # FLD: 11.2 % — SIGNIFICANT CHANGE UP (ref 10.3–14.5)
SODIUM SERPL-SCNC: 136 MMOL/L — SIGNIFICANT CHANGE UP (ref 135–145)
WBC # BLD: 7.2 K/UL — SIGNIFICANT CHANGE UP (ref 3.8–10.5)
WBC # FLD AUTO: 7.2 K/UL — SIGNIFICANT CHANGE UP (ref 3.8–10.5)

## 2025-08-25 PROCEDURE — 99233 SBSQ HOSP IP/OBS HIGH 50: CPT

## 2025-08-25 PROCEDURE — 93306 TTE W/DOPPLER COMPLETE: CPT | Mod: 26

## 2025-08-25 RX ORDER — TRAMADOL HYDROCHLORIDE 50 MG/1
25 TABLET, FILM COATED ORAL EVERY 4 HOURS
Refills: 0 | Status: DISCONTINUED | OUTPATIENT
Start: 2025-08-25 | End: 2025-08-29

## 2025-08-25 RX ORDER — APIXABAN 2.5 MG/1
10 TABLET, FILM COATED ORAL EVERY 12 HOURS
Refills: 0 | Status: COMPLETED | OUTPATIENT
Start: 2025-08-25 | End: 2025-09-01

## 2025-08-25 RX ORDER — HYDROMORPHONE/SOD CHLOR,ISO/PF 2 MG/10 ML
0.2 SYRINGE (ML) INJECTION ONCE
Refills: 0 | Status: DISCONTINUED | OUTPATIENT
Start: 2025-08-25 | End: 2025-08-25

## 2025-08-25 RX ORDER — TRAMADOL HYDROCHLORIDE 50 MG/1
50 TABLET, FILM COATED ORAL EVERY 4 HOURS
Refills: 0 | Status: DISCONTINUED | OUTPATIENT
Start: 2025-08-25 | End: 2025-08-29

## 2025-08-25 RX ADMIN — Medication 1000 MILLIGRAM(S): at 05:10

## 2025-08-25 RX ADMIN — Medication 5 MILLIGRAM(S): at 05:10

## 2025-08-25 RX ADMIN — Medication 0.5 MILLIGRAM(S): at 05:00

## 2025-08-25 RX ADMIN — Medication 2 TABLET(S): at 21:29

## 2025-08-25 RX ADMIN — TRAMADOL HYDROCHLORIDE 50 MILLIGRAM(S): 50 TABLET, FILM COATED ORAL at 17:07

## 2025-08-25 RX ADMIN — Medication 1000 MILLIGRAM(S): at 13:07

## 2025-08-25 RX ADMIN — GABAPENTIN 300 MILLIGRAM(S): 400 CAPSULE ORAL at 17:36

## 2025-08-25 RX ADMIN — Medication 1000 MILLIGRAM(S): at 21:28

## 2025-08-25 RX ADMIN — CYCLOBENZAPRINE HYDROCHLORIDE 5 MILLIGRAM(S): 15 CAPSULE, EXTENDED RELEASE ORAL at 05:10

## 2025-08-25 RX ADMIN — Medication 0.2 MILLIGRAM(S): at 23:11

## 2025-08-25 RX ADMIN — FINASTERIDE 5 MILLIGRAM(S): 1 TABLET, FILM COATED ORAL at 21:29

## 2025-08-25 RX ADMIN — POLYETHYLENE GLYCOL 3350 17 GRAM(S): 17 POWDER, FOR SOLUTION ORAL at 21:28

## 2025-08-25 RX ADMIN — GABAPENTIN 300 MILLIGRAM(S): 400 CAPSULE ORAL at 05:10

## 2025-08-25 RX ADMIN — FLUOXETINE HYDROCHLORIDE 10 MILLIGRAM(S): 20 CAPSULE ORAL at 13:08

## 2025-08-25 RX ADMIN — DOLUTEGRAVIR SODIUM 50 MILLIGRAM(S): 5 TABLET, FOR SUSPENSION ORAL at 13:08

## 2025-08-25 RX ADMIN — CYCLOBENZAPRINE HYDROCHLORIDE 5 MILLIGRAM(S): 15 CAPSULE, EXTENDED RELEASE ORAL at 21:28

## 2025-08-25 RX ADMIN — LAMIVUDINE 300 MILLIGRAM(S): 10 SOLUTION ORAL at 13:08

## 2025-08-25 RX ADMIN — GABAPENTIN 300 MILLIGRAM(S): 400 CAPSULE ORAL at 22:52

## 2025-08-25 RX ADMIN — TRAMADOL HYDROCHLORIDE 50 MILLIGRAM(S): 50 TABLET, FILM COATED ORAL at 22:27

## 2025-08-25 RX ADMIN — Medication 81 MILLIGRAM(S): at 13:07

## 2025-08-25 RX ADMIN — TRAMADOL HYDROCHLORIDE 50 MILLIGRAM(S): 50 TABLET, FILM COATED ORAL at 18:05

## 2025-08-25 RX ADMIN — APIXABAN 10 MILLIGRAM(S): 2.5 TABLET, FILM COATED ORAL at 17:36

## 2025-08-25 RX ADMIN — GABAPENTIN 300 MILLIGRAM(S): 400 CAPSULE ORAL at 13:07

## 2025-08-25 RX ADMIN — CYCLOBENZAPRINE HYDROCHLORIDE 5 MILLIGRAM(S): 15 CAPSULE, EXTENDED RELEASE ORAL at 13:07

## 2025-08-25 RX ADMIN — Medication 5 MILLIGRAM(S): at 17:36

## 2025-08-25 RX ADMIN — TAMSULOSIN HYDROCHLORIDE 0.4 MILLIGRAM(S): 0.4 CAPSULE ORAL at 21:29

## 2025-08-25 RX ADMIN — Medication 20 MILLIGRAM(S): at 13:08

## 2025-08-25 RX ADMIN — Medication 0.5 MILLIGRAM(S): at 04:28

## 2025-08-25 RX ADMIN — Medication 325 MILLIGRAM(S): at 13:08

## 2025-08-25 RX ADMIN — TRAMADOL HYDROCHLORIDE 50 MILLIGRAM(S): 50 TABLET, FILM COATED ORAL at 21:29

## 2025-08-25 RX ADMIN — ENOXAPARIN SODIUM 90 MILLIGRAM(S): 100 INJECTION SUBCUTANEOUS at 05:10

## 2025-08-25 RX ADMIN — Medication 0.2 MILLIGRAM(S): at 23:34

## 2025-08-26 DIAGNOSIS — D62 ACUTE POSTHEMORRHAGIC ANEMIA: ICD-10-CM

## 2025-08-26 LAB
ANION GAP SERPL CALC-SCNC: 7 MMOL/L — SIGNIFICANT CHANGE UP (ref 5–17)
ANION GAP SERPL CALC-SCNC: 8 MMOL/L — SIGNIFICANT CHANGE UP (ref 5–17)
BLD GP AB SCN SERPL QL: NEGATIVE — SIGNIFICANT CHANGE UP
BUN SERPL-MCNC: 18 MG/DL — SIGNIFICANT CHANGE UP (ref 7–23)
BUN SERPL-MCNC: 20 MG/DL — SIGNIFICANT CHANGE UP (ref 7–23)
CALCIUM SERPL-MCNC: 8.6 MG/DL — SIGNIFICANT CHANGE UP (ref 8.4–10.5)
CALCIUM SERPL-MCNC: 9.2 MG/DL — SIGNIFICANT CHANGE UP (ref 8.4–10.5)
CHLORIDE SERPL-SCNC: 100 MMOL/L — SIGNIFICANT CHANGE UP (ref 96–108)
CHLORIDE SERPL-SCNC: 99 MMOL/L — SIGNIFICANT CHANGE UP (ref 96–108)
CO2 SERPL-SCNC: 29 MMOL/L — SIGNIFICANT CHANGE UP (ref 22–31)
CO2 SERPL-SCNC: 29 MMOL/L — SIGNIFICANT CHANGE UP (ref 22–31)
CREAT SERPL-MCNC: 1.51 MG/DL — HIGH (ref 0.5–1.3)
CREAT SERPL-MCNC: 1.61 MG/DL — HIGH (ref 0.5–1.3)
EGFR: 47 ML/MIN/1.73M2 — LOW
EGFR: 47 ML/MIN/1.73M2 — LOW
EGFR: 51 ML/MIN/1.73M2 — LOW
EGFR: 51 ML/MIN/1.73M2 — LOW
FERRITIN SERPL-MCNC: 317 NG/ML — SIGNIFICANT CHANGE UP (ref 30–400)
GLUCOSE SERPL-MCNC: 108 MG/DL — HIGH (ref 70–99)
GLUCOSE SERPL-MCNC: 117 MG/DL — HIGH (ref 70–99)
HCT VFR BLD CALC: 23.1 % — LOW (ref 39–50)
HCT VFR BLD CALC: 24.8 % — LOW (ref 39–50)
HCT VFR BLD CALC: 25.7 % — LOW (ref 39–50)
HGB BLD-MCNC: 7.5 G/DL — LOW (ref 13–17)
HGB BLD-MCNC: 8 G/DL — LOW (ref 13–17)
HGB BLD-MCNC: 8.2 G/DL — LOW (ref 13–17)
IMMATURE RETICULOCYTE FRACTION %: 24.3 % — SIGNIFICANT CHANGE UP
IRON SATN MFR SERPL: 15 % — LOW (ref 16–55)
IRON SATN MFR SERPL: 25 UG/DL — LOW (ref 45–165)
MCHC RBC-ENTMCNC: 30.6 PG — SIGNIFICANT CHANGE UP (ref 27–34)
MCHC RBC-ENTMCNC: 30.8 PG — SIGNIFICANT CHANGE UP (ref 27–34)
MCHC RBC-ENTMCNC: 31.3 PG — SIGNIFICANT CHANGE UP (ref 27–34)
MCHC RBC-ENTMCNC: 31.9 G/DL — LOW (ref 32–36)
MCHC RBC-ENTMCNC: 32.3 G/DL — SIGNIFICANT CHANGE UP (ref 32–36)
MCHC RBC-ENTMCNC: 32.5 G/DL — SIGNIFICANT CHANGE UP (ref 32–36)
MCV RBC AUTO: 95.4 FL — SIGNIFICANT CHANGE UP (ref 80–100)
MCV RBC AUTO: 95.9 FL — SIGNIFICANT CHANGE UP (ref 80–100)
MCV RBC AUTO: 96.3 FL — SIGNIFICANT CHANGE UP (ref 80–100)
NRBC # BLD AUTO: 0 K/UL — SIGNIFICANT CHANGE UP (ref 0–0)
NRBC # FLD: 0 K/UL — SIGNIFICANT CHANGE UP (ref 0–0)
NRBC BLD AUTO-RTO: 0 /100 WBCS — SIGNIFICANT CHANGE UP (ref 0–0)
PLATELET # BLD AUTO: 189 K/UL — SIGNIFICANT CHANGE UP (ref 150–400)
PLATELET # BLD AUTO: 194 K/UL — SIGNIFICANT CHANGE UP (ref 150–400)
PLATELET # BLD AUTO: 214 K/UL — SIGNIFICANT CHANGE UP (ref 150–400)
PMV BLD: 10.2 FL — SIGNIFICANT CHANGE UP (ref 7–13)
PMV BLD: 10.3 FL — SIGNIFICANT CHANGE UP (ref 7–13)
PMV BLD: 9.7 FL — SIGNIFICANT CHANGE UP (ref 7–13)
POTASSIUM SERPL-MCNC: 4.6 MMOL/L — SIGNIFICANT CHANGE UP (ref 3.5–5.3)
POTASSIUM SERPL-MCNC: 4.6 MMOL/L — SIGNIFICANT CHANGE UP (ref 3.5–5.3)
POTASSIUM SERPL-SCNC: 4.6 MMOL/L — SIGNIFICANT CHANGE UP (ref 3.5–5.3)
POTASSIUM SERPL-SCNC: 4.6 MMOL/L — SIGNIFICANT CHANGE UP (ref 3.5–5.3)
RBC # BLD: 2.4 M/UL — LOW (ref 4.2–5.8)
RBC # BLD: 2.6 M/UL — LOW (ref 4.2–5.8)
RBC # BLD: 2.68 M/UL — LOW (ref 4.2–5.8)
RBC # BLD: 2.68 M/UL — LOW (ref 4.2–5.8)
RBC # FLD: 11 % — SIGNIFICANT CHANGE UP (ref 10.3–14.5)
RBC # FLD: 11.3 % — SIGNIFICANT CHANGE UP (ref 10.3–14.5)
RBC # FLD: 11.4 % — SIGNIFICANT CHANGE UP (ref 10.3–14.5)
RETICS #: 96.5 K/UL — SIGNIFICANT CHANGE UP (ref 25–125)
RETICS/RBC NFR: 3.6 % — HIGH (ref 0.5–2.5)
RETICULOCYTE HEMOGLOBIN EQUIVALENT: 29.1 PG — LOW (ref 36–38.6)
RH IG SCN BLD-IMP: POSITIVE — SIGNIFICANT CHANGE UP
SODIUM SERPL-SCNC: 136 MMOL/L — SIGNIFICANT CHANGE UP (ref 135–145)
SODIUM SERPL-SCNC: 136 MMOL/L — SIGNIFICANT CHANGE UP (ref 135–145)
TIBC SERPL-MCNC: 163 UG/DL — LOW (ref 220–430)
TRANSFERRIN SERPL-MCNC: 136 MG/DL — LOW (ref 200–360)
UIBC SERPL-MCNC: 138 UG/DL — SIGNIFICANT CHANGE UP (ref 110–370)
WBC # BLD: 7.57 K/UL — SIGNIFICANT CHANGE UP (ref 3.8–10.5)
WBC # BLD: 7.95 K/UL — SIGNIFICANT CHANGE UP (ref 3.8–10.5)
WBC # BLD: 9.26 K/UL — SIGNIFICANT CHANGE UP (ref 3.8–10.5)
WBC # FLD AUTO: 7.57 K/UL — SIGNIFICANT CHANGE UP (ref 3.8–10.5)
WBC # FLD AUTO: 7.95 K/UL — SIGNIFICANT CHANGE UP (ref 3.8–10.5)
WBC # FLD AUTO: 9.26 K/UL — SIGNIFICANT CHANGE UP (ref 3.8–10.5)

## 2025-08-26 PROCEDURE — 87075 CULTR BACTERIA EXCEPT BLOOD: CPT

## 2025-08-26 PROCEDURE — 97110 THERAPEUTIC EXERCISES: CPT

## 2025-08-26 PROCEDURE — 76000 FLUOROSCOPY <1 HR PHYS/QHP: CPT

## 2025-08-26 PROCEDURE — 97530 THERAPEUTIC ACTIVITIES: CPT

## 2025-08-26 PROCEDURE — 97165 OT EVAL LOW COMPLEX 30 MIN: CPT

## 2025-08-26 PROCEDURE — 84466 ASSAY OF TRANSFERRIN: CPT

## 2025-08-26 PROCEDURE — 86355 B CELLS TOTAL COUNT: CPT

## 2025-08-26 PROCEDURE — 85379 FIBRIN DEGRADATION QUANT: CPT

## 2025-08-26 PROCEDURE — 71275 CT ANGIOGRAPHY CHEST: CPT

## 2025-08-26 PROCEDURE — 87205 SMEAR GRAM STAIN: CPT

## 2025-08-26 PROCEDURE — 84295 ASSAY OF SERUM SODIUM: CPT

## 2025-08-26 PROCEDURE — 72170 X-RAY EXAM OF PELVIS: CPT

## 2025-08-26 PROCEDURE — 86901 BLOOD TYPING SEROLOGIC RH(D): CPT

## 2025-08-26 PROCEDURE — 86900 BLOOD TYPING SEROLOGIC ABO: CPT

## 2025-08-26 PROCEDURE — 86359 T CELLS TOTAL COUNT: CPT

## 2025-08-26 PROCEDURE — 82805 BLOOD GASES W/O2 SATURATION: CPT

## 2025-08-26 PROCEDURE — 82947 ASSAY GLUCOSE BLOOD QUANT: CPT

## 2025-08-26 PROCEDURE — C9399: CPT

## 2025-08-26 PROCEDURE — 83880 ASSAY OF NATRIURETIC PEPTIDE: CPT

## 2025-08-26 PROCEDURE — 86357 NK CELLS TOTAL COUNT: CPT

## 2025-08-26 PROCEDURE — 93306 TTE W/DOPPLER COMPLETE: CPT

## 2025-08-26 PROCEDURE — 86850 RBC ANTIBODY SCREEN: CPT

## 2025-08-26 PROCEDURE — 87116 MYCOBACTERIA CULTURE: CPT

## 2025-08-26 PROCEDURE — 84132 ASSAY OF SERUM POTASSIUM: CPT

## 2025-08-26 PROCEDURE — 85027 COMPLETE CBC AUTOMATED: CPT

## 2025-08-26 PROCEDURE — 87070 CULTURE OTHR SPECIMN AEROBIC: CPT

## 2025-08-26 PROCEDURE — 99233 SBSQ HOSP IP/OBS HIGH 50: CPT

## 2025-08-26 PROCEDURE — 83550 IRON BINDING TEST: CPT

## 2025-08-26 PROCEDURE — 82728 ASSAY OF FERRITIN: CPT

## 2025-08-26 PROCEDURE — 93970 EXTREMITY STUDY: CPT

## 2025-08-26 PROCEDURE — 80048 BASIC METABOLIC PNL TOTAL CA: CPT

## 2025-08-26 PROCEDURE — 97161 PT EVAL LOW COMPLEX 20 MIN: CPT

## 2025-08-26 PROCEDURE — 82330 ASSAY OF CALCIUM: CPT

## 2025-08-26 PROCEDURE — 36415 COLL VENOUS BLD VENIPUNCTURE: CPT

## 2025-08-26 PROCEDURE — 71045 X-RAY EXAM CHEST 1 VIEW: CPT

## 2025-08-26 PROCEDURE — 86360 T CELL ABSOLUTE COUNT/RATIO: CPT

## 2025-08-26 PROCEDURE — 97116 GAIT TRAINING THERAPY: CPT

## 2025-08-26 PROCEDURE — 83540 ASSAY OF IRON: CPT

## 2025-08-26 PROCEDURE — 85045 AUTOMATED RETICULOCYTE COUNT: CPT

## 2025-08-26 PROCEDURE — 84484 ASSAY OF TROPONIN QUANT: CPT

## 2025-08-26 PROCEDURE — 93005 ELECTROCARDIOGRAM TRACING: CPT

## 2025-08-26 PROCEDURE — 87102 FUNGUS ISOLATION CULTURE: CPT

## 2025-08-26 RX ORDER — IRON SUCROSE 20 MG/ML
200 INJECTION, SOLUTION INTRAVENOUS EVERY 24 HOURS
Refills: 0 | Status: COMPLETED | OUTPATIENT
Start: 2025-08-26 | End: 2025-08-28

## 2025-08-26 RX ORDER — ACETAMINOPHEN 500 MG/5ML
1000 LIQUID (ML) ORAL ONCE
Refills: 0 | Status: COMPLETED | OUTPATIENT
Start: 2025-08-26 | End: 2025-08-26

## 2025-08-26 RX ORDER — GABAPENTIN 400 MG/1
300 CAPSULE ORAL EVERY 12 HOURS
Refills: 0 | Status: DISCONTINUED | OUTPATIENT
Start: 2025-08-26 | End: 2025-09-03

## 2025-08-26 RX ORDER — ACETAMINOPHEN 500 MG/5ML
1000 LIQUID (ML) ORAL EVERY 8 HOURS
Refills: 0 | Status: DISCONTINUED | OUTPATIENT
Start: 2025-08-27 | End: 2025-08-27

## 2025-08-26 RX ORDER — HYDROMORPHONE/SOD CHLOR,ISO/PF 2 MG/10 ML
0.2 SYRINGE (ML) INJECTION EVERY 12 HOURS
Refills: 0 | Status: DISCONTINUED | OUTPATIENT
Start: 2025-08-26 | End: 2025-09-02

## 2025-08-26 RX ADMIN — LAMIVUDINE 300 MILLIGRAM(S): 10 SOLUTION ORAL at 11:15

## 2025-08-26 RX ADMIN — TRAMADOL HYDROCHLORIDE 50 MILLIGRAM(S): 50 TABLET, FILM COATED ORAL at 09:14

## 2025-08-26 RX ADMIN — Medication 5 MILLIGRAM(S): at 17:45

## 2025-08-26 RX ADMIN — Medication 325 MILLIGRAM(S): at 11:16

## 2025-08-26 RX ADMIN — IRON SUCROSE 110 MILLIGRAM(S): 20 INJECTION, SOLUTION INTRAVENOUS at 13:41

## 2025-08-26 RX ADMIN — GABAPENTIN 300 MILLIGRAM(S): 400 CAPSULE ORAL at 05:27

## 2025-08-26 RX ADMIN — Medication 1000 MILLILITER(S): at 12:19

## 2025-08-26 RX ADMIN — TRAMADOL HYDROCHLORIDE 50 MILLIGRAM(S): 50 TABLET, FILM COATED ORAL at 13:42

## 2025-08-26 RX ADMIN — GABAPENTIN 300 MILLIGRAM(S): 400 CAPSULE ORAL at 17:45

## 2025-08-26 RX ADMIN — Medication 1000 MILLIGRAM(S): at 14:41

## 2025-08-26 RX ADMIN — MAGNESIUM HYDROXIDE 30 MILLILITER(S): 400 SUSPENSION ORAL at 09:14

## 2025-08-26 RX ADMIN — Medication 5 MILLIGRAM(S): at 05:27

## 2025-08-26 RX ADMIN — DOLUTEGRAVIR SODIUM 50 MILLIGRAM(S): 5 TABLET, FOR SUSPENSION ORAL at 11:15

## 2025-08-26 RX ADMIN — CYCLOBENZAPRINE HYDROCHLORIDE 5 MILLIGRAM(S): 15 CAPSULE, EXTENDED RELEASE ORAL at 13:41

## 2025-08-26 RX ADMIN — TRAMADOL HYDROCHLORIDE 50 MILLIGRAM(S): 50 TABLET, FILM COATED ORAL at 21:50

## 2025-08-26 RX ADMIN — APIXABAN 10 MILLIGRAM(S): 2.5 TABLET, FILM COATED ORAL at 17:45

## 2025-08-26 RX ADMIN — APIXABAN 10 MILLIGRAM(S): 2.5 TABLET, FILM COATED ORAL at 05:27

## 2025-08-26 RX ADMIN — CYCLOBENZAPRINE HYDROCHLORIDE 5 MILLIGRAM(S): 15 CAPSULE, EXTENDED RELEASE ORAL at 05:26

## 2025-08-26 RX ADMIN — Medication 1000 MILLIGRAM(S): at 05:26

## 2025-08-26 RX ADMIN — TRAMADOL HYDROCHLORIDE 50 MILLIGRAM(S): 50 TABLET, FILM COATED ORAL at 18:45

## 2025-08-26 RX ADMIN — FINASTERIDE 5 MILLIGRAM(S): 1 TABLET, FILM COATED ORAL at 22:35

## 2025-08-26 RX ADMIN — TRAMADOL HYDROCHLORIDE 50 MILLIGRAM(S): 50 TABLET, FILM COATED ORAL at 22:50

## 2025-08-26 RX ADMIN — TRAMADOL HYDROCHLORIDE 50 MILLIGRAM(S): 50 TABLET, FILM COATED ORAL at 10:14

## 2025-08-26 RX ADMIN — CYCLOBENZAPRINE HYDROCHLORIDE 5 MILLIGRAM(S): 15 CAPSULE, EXTENDED RELEASE ORAL at 22:35

## 2025-08-26 RX ADMIN — TRAMADOL HYDROCHLORIDE 50 MILLIGRAM(S): 50 TABLET, FILM COATED ORAL at 17:45

## 2025-08-26 RX ADMIN — TAMSULOSIN HYDROCHLORIDE 0.4 MILLIGRAM(S): 0.4 CAPSULE ORAL at 22:35

## 2025-08-26 RX ADMIN — Medication 400 MILLIGRAM(S): at 22:34

## 2025-08-26 RX ADMIN — Medication 20 MILLIGRAM(S): at 11:16

## 2025-08-26 RX ADMIN — FLUOXETINE HYDROCHLORIDE 10 MILLIGRAM(S): 20 CAPSULE ORAL at 11:15

## 2025-08-26 RX ADMIN — Medication 1000 MILLIGRAM(S): at 13:41

## 2025-08-26 RX ADMIN — TRAMADOL HYDROCHLORIDE 50 MILLIGRAM(S): 50 TABLET, FILM COATED ORAL at 14:41

## 2025-08-26 RX ADMIN — Medication 2 TABLET(S): at 22:34

## 2025-08-26 RX ADMIN — POLYETHYLENE GLYCOL 3350 17 GRAM(S): 17 POWDER, FOR SOLUTION ORAL at 22:34

## 2025-08-27 LAB
ANION GAP SERPL CALC-SCNC: 7 MMOL/L — SIGNIFICANT CHANGE UP (ref 5–17)
BUN SERPL-MCNC: 20 MG/DL — SIGNIFICANT CHANGE UP (ref 7–23)
CALCIUM SERPL-MCNC: 8.9 MG/DL — SIGNIFICANT CHANGE UP (ref 8.4–10.5)
CHLORIDE SERPL-SCNC: 98 MMOL/L — SIGNIFICANT CHANGE UP (ref 96–108)
CO2 SERPL-SCNC: 29 MMOL/L — SIGNIFICANT CHANGE UP (ref 22–31)
CREAT SERPL-MCNC: 1.64 MG/DL — HIGH (ref 0.5–1.3)
EGFR: 46 ML/MIN/1.73M2 — LOW
EGFR: 46 ML/MIN/1.73M2 — LOW
GLUCOSE SERPL-MCNC: 95 MG/DL — SIGNIFICANT CHANGE UP (ref 70–99)
HCT VFR BLD CALC: 25.1 % — LOW (ref 39–50)
HGB BLD-MCNC: 8.2 G/DL — LOW (ref 13–17)
MCHC RBC-ENTMCNC: 31.1 PG — SIGNIFICANT CHANGE UP (ref 27–34)
MCHC RBC-ENTMCNC: 32.7 G/DL — SIGNIFICANT CHANGE UP (ref 32–36)
MCV RBC AUTO: 95.1 FL — SIGNIFICANT CHANGE UP (ref 80–100)
NRBC # BLD AUTO: 0 K/UL — SIGNIFICANT CHANGE UP (ref 0–0)
NRBC # FLD: 0 K/UL — SIGNIFICANT CHANGE UP (ref 0–0)
NRBC BLD AUTO-RTO: 0 /100 WBCS — SIGNIFICANT CHANGE UP (ref 0–0)
PLATELET # BLD AUTO: 204 K/UL — SIGNIFICANT CHANGE UP (ref 150–400)
PMV BLD: 10.1 FL — SIGNIFICANT CHANGE UP (ref 7–13)
POTASSIUM SERPL-MCNC: 5 MMOL/L — SIGNIFICANT CHANGE UP (ref 3.5–5.3)
POTASSIUM SERPL-SCNC: 5 MMOL/L — SIGNIFICANT CHANGE UP (ref 3.5–5.3)
RBC # BLD: 2.64 M/UL — LOW (ref 4.2–5.8)
RBC # FLD: 11.7 % — SIGNIFICANT CHANGE UP (ref 10.3–14.5)
SODIUM SERPL-SCNC: 134 MMOL/L — LOW (ref 135–145)
WBC # BLD: 7.23 K/UL — SIGNIFICANT CHANGE UP (ref 3.8–10.5)
WBC # FLD AUTO: 7.23 K/UL — SIGNIFICANT CHANGE UP (ref 3.8–10.5)

## 2025-08-27 PROCEDURE — 86923 COMPATIBILITY TEST ELECTRIC: CPT

## 2025-08-27 PROCEDURE — 82805 BLOOD GASES W/O2 SATURATION: CPT

## 2025-08-27 PROCEDURE — 86357 NK CELLS TOTAL COUNT: CPT

## 2025-08-27 PROCEDURE — 84132 ASSAY OF SERUM POTASSIUM: CPT

## 2025-08-27 PROCEDURE — 71275 CT ANGIOGRAPHY CHEST: CPT

## 2025-08-27 PROCEDURE — 86355 B CELLS TOTAL COUNT: CPT

## 2025-08-27 PROCEDURE — 88300 SURGICAL PATH GROSS: CPT

## 2025-08-27 PROCEDURE — 86850 RBC ANTIBODY SCREEN: CPT

## 2025-08-27 PROCEDURE — 84466 ASSAY OF TRANSFERRIN: CPT

## 2025-08-27 PROCEDURE — 97110 THERAPEUTIC EXERCISES: CPT

## 2025-08-27 PROCEDURE — 83540 ASSAY OF IRON: CPT

## 2025-08-27 PROCEDURE — 97161 PT EVAL LOW COMPLEX 20 MIN: CPT

## 2025-08-27 PROCEDURE — 86900 BLOOD TYPING SEROLOGIC ABO: CPT

## 2025-08-27 PROCEDURE — 97116 GAIT TRAINING THERAPY: CPT

## 2025-08-27 PROCEDURE — 93306 TTE W/DOPPLER COMPLETE: CPT

## 2025-08-27 PROCEDURE — 87075 CULTR BACTERIA EXCEPT BLOOD: CPT

## 2025-08-27 PROCEDURE — 87102 FUNGUS ISOLATION CULTURE: CPT

## 2025-08-27 PROCEDURE — 87070 CULTURE OTHR SPECIMN AEROBIC: CPT

## 2025-08-27 PROCEDURE — 86901 BLOOD TYPING SEROLOGIC RH(D): CPT

## 2025-08-27 PROCEDURE — 71045 X-RAY EXAM CHEST 1 VIEW: CPT

## 2025-08-27 PROCEDURE — 87116 MYCOBACTERIA CULTURE: CPT

## 2025-08-27 PROCEDURE — 80048 BASIC METABOLIC PNL TOTAL CA: CPT

## 2025-08-27 PROCEDURE — 86359 T CELLS TOTAL COUNT: CPT

## 2025-08-27 PROCEDURE — 97165 OT EVAL LOW COMPLEX 30 MIN: CPT

## 2025-08-27 PROCEDURE — 84484 ASSAY OF TROPONIN QUANT: CPT

## 2025-08-27 PROCEDURE — 85045 AUTOMATED RETICULOCYTE COUNT: CPT

## 2025-08-27 PROCEDURE — 97530 THERAPEUTIC ACTIVITIES: CPT

## 2025-08-27 PROCEDURE — 84295 ASSAY OF SERUM SODIUM: CPT

## 2025-08-27 PROCEDURE — 87205 SMEAR GRAM STAIN: CPT

## 2025-08-27 PROCEDURE — 86360 T CELL ABSOLUTE COUNT/RATIO: CPT

## 2025-08-27 PROCEDURE — 85027 COMPLETE CBC AUTOMATED: CPT

## 2025-08-27 PROCEDURE — 83880 ASSAY OF NATRIURETIC PEPTIDE: CPT

## 2025-08-27 PROCEDURE — 99233 SBSQ HOSP IP/OBS HIGH 50: CPT

## 2025-08-27 PROCEDURE — 72170 X-RAY EXAM OF PELVIS: CPT

## 2025-08-27 PROCEDURE — 82330 ASSAY OF CALCIUM: CPT

## 2025-08-27 PROCEDURE — C9399: CPT

## 2025-08-27 PROCEDURE — 36415 COLL VENOUS BLD VENIPUNCTURE: CPT

## 2025-08-27 PROCEDURE — 82728 ASSAY OF FERRITIN: CPT

## 2025-08-27 PROCEDURE — 93005 ELECTROCARDIOGRAM TRACING: CPT

## 2025-08-27 PROCEDURE — 82947 ASSAY GLUCOSE BLOOD QUANT: CPT

## 2025-08-27 PROCEDURE — 83550 IRON BINDING TEST: CPT

## 2025-08-27 PROCEDURE — 76000 FLUOROSCOPY <1 HR PHYS/QHP: CPT

## 2025-08-27 PROCEDURE — 85379 FIBRIN DEGRADATION QUANT: CPT

## 2025-08-27 PROCEDURE — 93970 EXTREMITY STUDY: CPT

## 2025-08-27 RX ORDER — ACETAMINOPHEN 500 MG/5ML
975 LIQUID (ML) ORAL ONCE
Refills: 0 | Status: COMPLETED | OUTPATIENT
Start: 2025-08-27 | End: 2025-08-27

## 2025-08-27 RX ORDER — ACETAMINOPHEN 500 MG/5ML
1000 LIQUID (ML) ORAL ONCE
Refills: 0 | Status: COMPLETED | OUTPATIENT
Start: 2025-08-27 | End: 2025-08-27

## 2025-08-27 RX ORDER — ACETAMINOPHEN 500 MG/5ML
975 LIQUID (ML) ORAL EVERY 8 HOURS
Refills: 0 | Status: DISCONTINUED | OUTPATIENT
Start: 2025-08-28 | End: 2025-09-03

## 2025-08-27 RX ORDER — ACETAMINOPHEN 500 MG/5ML
1000 LIQUID (ML) ORAL ONCE
Refills: 0 | Status: DISCONTINUED | OUTPATIENT
Start: 2025-08-27 | End: 2025-08-27

## 2025-08-27 RX ADMIN — LAMIVUDINE 300 MILLIGRAM(S): 10 SOLUTION ORAL at 13:42

## 2025-08-27 RX ADMIN — TRAMADOL HYDROCHLORIDE 50 MILLIGRAM(S): 50 TABLET, FILM COATED ORAL at 15:49

## 2025-08-27 RX ADMIN — Medication 975 MILLIGRAM(S): at 14:20

## 2025-08-27 RX ADMIN — DOLUTEGRAVIR SODIUM 50 MILLIGRAM(S): 5 TABLET, FOR SUSPENSION ORAL at 13:42

## 2025-08-27 RX ADMIN — Medication 1000 MILLIGRAM(S): at 06:03

## 2025-08-27 RX ADMIN — TRAMADOL HYDROCHLORIDE 50 MILLIGRAM(S): 50 TABLET, FILM COATED ORAL at 16:45

## 2025-08-27 RX ADMIN — CYCLOBENZAPRINE HYDROCHLORIDE 5 MILLIGRAM(S): 15 CAPSULE, EXTENDED RELEASE ORAL at 13:43

## 2025-08-27 RX ADMIN — TRAMADOL HYDROCHLORIDE 50 MILLIGRAM(S): 50 TABLET, FILM COATED ORAL at 05:15

## 2025-08-27 RX ADMIN — TRAMADOL HYDROCHLORIDE 50 MILLIGRAM(S): 50 TABLET, FILM COATED ORAL at 21:50

## 2025-08-27 RX ADMIN — TAMSULOSIN HYDROCHLORIDE 0.4 MILLIGRAM(S): 0.4 CAPSULE ORAL at 21:48

## 2025-08-27 RX ADMIN — Medication 400 MILLIGRAM(S): at 21:47

## 2025-08-27 RX ADMIN — TRAMADOL HYDROCHLORIDE 50 MILLIGRAM(S): 50 TABLET, FILM COATED ORAL at 04:17

## 2025-08-27 RX ADMIN — GABAPENTIN 300 MILLIGRAM(S): 400 CAPSULE ORAL at 06:03

## 2025-08-27 RX ADMIN — Medication 10 MILLIGRAM(S): at 17:52

## 2025-08-27 RX ADMIN — IRON SUCROSE 110 MILLIGRAM(S): 20 INJECTION, SOLUTION INTRAVENOUS at 15:22

## 2025-08-27 RX ADMIN — FLUOXETINE HYDROCHLORIDE 10 MILLIGRAM(S): 20 CAPSULE ORAL at 13:42

## 2025-08-27 RX ADMIN — TRAMADOL HYDROCHLORIDE 50 MILLIGRAM(S): 50 TABLET, FILM COATED ORAL at 20:49

## 2025-08-27 RX ADMIN — Medication 5 MILLIGRAM(S): at 06:03

## 2025-08-27 RX ADMIN — FINASTERIDE 5 MILLIGRAM(S): 1 TABLET, FILM COATED ORAL at 21:47

## 2025-08-27 RX ADMIN — GABAPENTIN 300 MILLIGRAM(S): 400 CAPSULE ORAL at 17:51

## 2025-08-27 RX ADMIN — APIXABAN 10 MILLIGRAM(S): 2.5 TABLET, FILM COATED ORAL at 05:23

## 2025-08-27 RX ADMIN — CYCLOBENZAPRINE HYDROCHLORIDE 5 MILLIGRAM(S): 15 CAPSULE, EXTENDED RELEASE ORAL at 21:47

## 2025-08-27 RX ADMIN — CYCLOBENZAPRINE HYDROCHLORIDE 5 MILLIGRAM(S): 15 CAPSULE, EXTENDED RELEASE ORAL at 06:03

## 2025-08-27 RX ADMIN — Medication 20 MILLIGRAM(S): at 13:42

## 2025-08-27 RX ADMIN — Medication 5 MILLIGRAM(S): at 17:51

## 2025-08-27 RX ADMIN — APIXABAN 10 MILLIGRAM(S): 2.5 TABLET, FILM COATED ORAL at 17:51

## 2025-08-28 LAB
ANION GAP SERPL CALC-SCNC: 7 MMOL/L — SIGNIFICANT CHANGE UP (ref 5–17)
BUN SERPL-MCNC: 20 MG/DL — SIGNIFICANT CHANGE UP (ref 7–23)
CALCIUM SERPL-MCNC: 8.9 MG/DL — SIGNIFICANT CHANGE UP (ref 8.4–10.5)
CHLORIDE SERPL-SCNC: 103 MMOL/L — SIGNIFICANT CHANGE UP (ref 96–108)
CO2 SERPL-SCNC: 28 MMOL/L — SIGNIFICANT CHANGE UP (ref 22–31)
CREAT SERPL-MCNC: 1.6 MG/DL — HIGH (ref 0.5–1.3)
EGFR: 48 ML/MIN/1.73M2 — LOW
EGFR: 48 ML/MIN/1.73M2 — LOW
GLUCOSE SERPL-MCNC: 93 MG/DL — SIGNIFICANT CHANGE UP (ref 70–99)
HCT VFR BLD CALC: 25.5 % — LOW (ref 39–50)
HCT VFR BLD CALC: 30.1 % — LOW (ref 39–50)
HGB BLD-MCNC: 8.2 G/DL — LOW (ref 13–17)
HGB BLD-MCNC: 9.5 G/DL — LOW (ref 13–17)
INR BLD: 1.38 — HIGH (ref 0.85–1.16)
MCHC RBC-ENTMCNC: 30.4 PG — SIGNIFICANT CHANGE UP (ref 27–34)
MCHC RBC-ENTMCNC: 30.7 PG — SIGNIFICANT CHANGE UP (ref 27–34)
MCHC RBC-ENTMCNC: 31.6 G/DL — LOW (ref 32–36)
MCHC RBC-ENTMCNC: 32.2 G/DL — SIGNIFICANT CHANGE UP (ref 32–36)
MCV RBC AUTO: 95.5 FL — SIGNIFICANT CHANGE UP (ref 80–100)
MCV RBC AUTO: 96.5 FL — SIGNIFICANT CHANGE UP (ref 80–100)
NRBC # BLD AUTO: 0 K/UL — SIGNIFICANT CHANGE UP (ref 0–0)
NRBC # BLD AUTO: 0 K/UL — SIGNIFICANT CHANGE UP (ref 0–0)
NRBC # FLD: 0 K/UL — SIGNIFICANT CHANGE UP (ref 0–0)
NRBC # FLD: 0 K/UL — SIGNIFICANT CHANGE UP (ref 0–0)
NRBC BLD AUTO-RTO: 0 /100 WBCS — SIGNIFICANT CHANGE UP (ref 0–0)
NRBC BLD AUTO-RTO: 0 /100 WBCS — SIGNIFICANT CHANGE UP (ref 0–0)
PLATELET # BLD AUTO: 243 K/UL — SIGNIFICANT CHANGE UP (ref 150–400)
PLATELET # BLD AUTO: 287 K/UL — SIGNIFICANT CHANGE UP (ref 150–400)
PMV BLD: 9.9 FL — SIGNIFICANT CHANGE UP (ref 7–13)
PMV BLD: 9.9 FL — SIGNIFICANT CHANGE UP (ref 7–13)
POTASSIUM SERPL-MCNC: 4.6 MMOL/L — SIGNIFICANT CHANGE UP (ref 3.5–5.3)
POTASSIUM SERPL-SCNC: 4.6 MMOL/L — SIGNIFICANT CHANGE UP (ref 3.5–5.3)
PROTHROM AB SERPL-ACNC: 15.9 SEC — HIGH (ref 9.9–13.4)
RBC # BLD: 2.67 M/UL — LOW (ref 4.2–5.8)
RBC # BLD: 3.12 M/UL — LOW (ref 4.2–5.8)
RBC # FLD: 12 % — SIGNIFICANT CHANGE UP (ref 10.3–14.5)
RBC # FLD: 12.1 % — SIGNIFICANT CHANGE UP (ref 10.3–14.5)
SODIUM SERPL-SCNC: 138 MMOL/L — SIGNIFICANT CHANGE UP (ref 135–145)
WBC # BLD: 6.66 K/UL — SIGNIFICANT CHANGE UP (ref 3.8–10.5)
WBC # BLD: 7.54 K/UL — SIGNIFICANT CHANGE UP (ref 3.8–10.5)
WBC # FLD AUTO: 6.66 K/UL — SIGNIFICANT CHANGE UP (ref 3.8–10.5)
WBC # FLD AUTO: 7.54 K/UL — SIGNIFICANT CHANGE UP (ref 3.8–10.5)

## 2025-08-28 PROCEDURE — C9399: CPT

## 2025-08-28 PROCEDURE — 97161 PT EVAL LOW COMPLEX 20 MIN: CPT

## 2025-08-28 PROCEDURE — 93970 EXTREMITY STUDY: CPT

## 2025-08-28 PROCEDURE — 99233 SBSQ HOSP IP/OBS HIGH 50: CPT

## 2025-08-28 PROCEDURE — 87075 CULTR BACTERIA EXCEPT BLOOD: CPT

## 2025-08-28 PROCEDURE — 80048 BASIC METABOLIC PNL TOTAL CA: CPT

## 2025-08-28 PROCEDURE — 72170 X-RAY EXAM OF PELVIS: CPT

## 2025-08-28 PROCEDURE — 71275 CT ANGIOGRAPHY CHEST: CPT

## 2025-08-28 PROCEDURE — 84132 ASSAY OF SERUM POTASSIUM: CPT

## 2025-08-28 PROCEDURE — 87070 CULTURE OTHR SPECIMN AEROBIC: CPT

## 2025-08-28 PROCEDURE — 85610 PROTHROMBIN TIME: CPT

## 2025-08-28 PROCEDURE — 85027 COMPLETE CBC AUTOMATED: CPT

## 2025-08-28 PROCEDURE — 36415 COLL VENOUS BLD VENIPUNCTURE: CPT

## 2025-08-28 PROCEDURE — 84295 ASSAY OF SERUM SODIUM: CPT

## 2025-08-28 PROCEDURE — 97530 THERAPEUTIC ACTIVITIES: CPT

## 2025-08-28 PROCEDURE — 83880 ASSAY OF NATRIURETIC PEPTIDE: CPT

## 2025-08-28 PROCEDURE — 86357 NK CELLS TOTAL COUNT: CPT

## 2025-08-28 PROCEDURE — 82330 ASSAY OF CALCIUM: CPT

## 2025-08-28 PROCEDURE — 97110 THERAPEUTIC EXERCISES: CPT

## 2025-08-28 PROCEDURE — 97165 OT EVAL LOW COMPLEX 30 MIN: CPT

## 2025-08-28 PROCEDURE — 87116 MYCOBACTERIA CULTURE: CPT

## 2025-08-28 PROCEDURE — 86850 RBC ANTIBODY SCREEN: CPT

## 2025-08-28 PROCEDURE — 87205 SMEAR GRAM STAIN: CPT

## 2025-08-28 PROCEDURE — 86355 B CELLS TOTAL COUNT: CPT

## 2025-08-28 PROCEDURE — 86359 T CELLS TOTAL COUNT: CPT

## 2025-08-28 PROCEDURE — 93005 ELECTROCARDIOGRAM TRACING: CPT

## 2025-08-28 PROCEDURE — 87102 FUNGUS ISOLATION CULTURE: CPT

## 2025-08-28 PROCEDURE — 85379 FIBRIN DEGRADATION QUANT: CPT

## 2025-08-28 PROCEDURE — P9016: CPT

## 2025-08-28 PROCEDURE — 83540 ASSAY OF IRON: CPT

## 2025-08-28 PROCEDURE — 88300 SURGICAL PATH GROSS: CPT

## 2025-08-28 PROCEDURE — 86360 T CELL ABSOLUTE COUNT/RATIO: CPT

## 2025-08-28 PROCEDURE — 71045 X-RAY EXAM CHEST 1 VIEW: CPT

## 2025-08-28 PROCEDURE — 86923 COMPATIBILITY TEST ELECTRIC: CPT

## 2025-08-28 PROCEDURE — 85045 AUTOMATED RETICULOCYTE COUNT: CPT

## 2025-08-28 PROCEDURE — 82805 BLOOD GASES W/O2 SATURATION: CPT

## 2025-08-28 PROCEDURE — 82947 ASSAY GLUCOSE BLOOD QUANT: CPT

## 2025-08-28 PROCEDURE — 93306 TTE W/DOPPLER COMPLETE: CPT

## 2025-08-28 PROCEDURE — 76000 FLUOROSCOPY <1 HR PHYS/QHP: CPT

## 2025-08-28 PROCEDURE — 97116 GAIT TRAINING THERAPY: CPT

## 2025-08-28 PROCEDURE — 84484 ASSAY OF TROPONIN QUANT: CPT

## 2025-08-28 PROCEDURE — 82728 ASSAY OF FERRITIN: CPT

## 2025-08-28 PROCEDURE — 83550 IRON BINDING TEST: CPT

## 2025-08-28 PROCEDURE — 84466 ASSAY OF TRANSFERRIN: CPT

## 2025-08-28 PROCEDURE — 86900 BLOOD TYPING SEROLOGIC ABO: CPT

## 2025-08-28 PROCEDURE — 86901 BLOOD TYPING SEROLOGIC RH(D): CPT

## 2025-08-28 RX ADMIN — TRAMADOL HYDROCHLORIDE 50 MILLIGRAM(S): 50 TABLET, FILM COATED ORAL at 04:09

## 2025-08-28 RX ADMIN — APIXABAN 10 MILLIGRAM(S): 2.5 TABLET, FILM COATED ORAL at 05:04

## 2025-08-28 RX ADMIN — APIXABAN 10 MILLIGRAM(S): 2.5 TABLET, FILM COATED ORAL at 17:39

## 2025-08-28 RX ADMIN — GABAPENTIN 300 MILLIGRAM(S): 400 CAPSULE ORAL at 17:39

## 2025-08-28 RX ADMIN — GABAPENTIN 300 MILLIGRAM(S): 400 CAPSULE ORAL at 05:30

## 2025-08-28 RX ADMIN — Medication 975 MILLIGRAM(S): at 05:29

## 2025-08-28 RX ADMIN — TRAMADOL HYDROCHLORIDE 50 MILLIGRAM(S): 50 TABLET, FILM COATED ORAL at 18:40

## 2025-08-28 RX ADMIN — Medication 975 MILLIGRAM(S): at 13:36

## 2025-08-28 RX ADMIN — DOLUTEGRAVIR SODIUM 50 MILLIGRAM(S): 5 TABLET, FOR SUSPENSION ORAL at 13:36

## 2025-08-28 RX ADMIN — CYCLOBENZAPRINE HYDROCHLORIDE 5 MILLIGRAM(S): 15 CAPSULE, EXTENDED RELEASE ORAL at 21:59

## 2025-08-28 RX ADMIN — IRON SUCROSE 110 MILLIGRAM(S): 20 INJECTION, SOLUTION INTRAVENOUS at 13:36

## 2025-08-28 RX ADMIN — TRAMADOL HYDROCHLORIDE 50 MILLIGRAM(S): 50 TABLET, FILM COATED ORAL at 17:43

## 2025-08-28 RX ADMIN — TRAMADOL HYDROCHLORIDE 50 MILLIGRAM(S): 50 TABLET, FILM COATED ORAL at 21:58

## 2025-08-28 RX ADMIN — Medication 40 MILLIGRAM(S): at 17:38

## 2025-08-28 RX ADMIN — TRAMADOL HYDROCHLORIDE 50 MILLIGRAM(S): 50 TABLET, FILM COATED ORAL at 05:10

## 2025-08-28 RX ADMIN — TRAMADOL HYDROCHLORIDE 50 MILLIGRAM(S): 50 TABLET, FILM COATED ORAL at 22:45

## 2025-08-28 RX ADMIN — TRAMADOL HYDROCHLORIDE 50 MILLIGRAM(S): 50 TABLET, FILM COATED ORAL at 09:50

## 2025-08-28 RX ADMIN — POLYETHYLENE GLYCOL 3350 17 GRAM(S): 17 POWDER, FOR SOLUTION ORAL at 21:59

## 2025-08-28 RX ADMIN — TRAMADOL HYDROCHLORIDE 50 MILLIGRAM(S): 50 TABLET, FILM COATED ORAL at 08:53

## 2025-08-28 RX ADMIN — Medication 975 MILLIGRAM(S): at 21:59

## 2025-08-28 RX ADMIN — FINASTERIDE 5 MILLIGRAM(S): 1 TABLET, FILM COATED ORAL at 21:59

## 2025-08-28 RX ADMIN — TAMSULOSIN HYDROCHLORIDE 0.4 MILLIGRAM(S): 0.4 CAPSULE ORAL at 21:59

## 2025-08-28 RX ADMIN — FLUOXETINE HYDROCHLORIDE 10 MILLIGRAM(S): 20 CAPSULE ORAL at 13:37

## 2025-08-28 RX ADMIN — CYCLOBENZAPRINE HYDROCHLORIDE 5 MILLIGRAM(S): 15 CAPSULE, EXTENDED RELEASE ORAL at 05:30

## 2025-08-28 RX ADMIN — CYCLOBENZAPRINE HYDROCHLORIDE 5 MILLIGRAM(S): 15 CAPSULE, EXTENDED RELEASE ORAL at 13:37

## 2025-08-28 RX ADMIN — Medication 975 MILLIGRAM(S): at 22:43

## 2025-08-28 RX ADMIN — Medication 2 TABLET(S): at 21:58

## 2025-08-28 RX ADMIN — LAMIVUDINE 300 MILLIGRAM(S): 10 SOLUTION ORAL at 13:37

## 2025-08-29 LAB
ANION GAP SERPL CALC-SCNC: 6 MMOL/L — SIGNIFICANT CHANGE UP (ref 5–17)
BLD GP AB SCN SERPL QL: NEGATIVE — SIGNIFICANT CHANGE UP
BUN SERPL-MCNC: 19 MG/DL — SIGNIFICANT CHANGE UP (ref 7–23)
CALCIUM SERPL-MCNC: 8.8 MG/DL — SIGNIFICANT CHANGE UP (ref 8.4–10.5)
CHLORIDE SERPL-SCNC: 105 MMOL/L — SIGNIFICANT CHANGE UP (ref 96–108)
CO2 SERPL-SCNC: 28 MMOL/L — SIGNIFICANT CHANGE UP (ref 22–31)
CREAT SERPL-MCNC: 1.56 MG/DL — HIGH (ref 0.5–1.3)
EGFR: 49 ML/MIN/1.73M2 — LOW
EGFR: 49 ML/MIN/1.73M2 — LOW
GLUCOSE SERPL-MCNC: 86 MG/DL — SIGNIFICANT CHANGE UP (ref 70–99)
HCT VFR BLD CALC: 26.8 % — LOW (ref 39–50)
HCT VFR BLD CALC: 28.6 % — LOW (ref 39–50)
HGB BLD-MCNC: 8.5 G/DL — LOW (ref 13–17)
HGB BLD-MCNC: 9.1 G/DL — LOW (ref 13–17)
INR BLD: 1.21 — HIGH (ref 0.85–1.16)
MCHC RBC-ENTMCNC: 30.5 PG — SIGNIFICANT CHANGE UP (ref 27–34)
MCHC RBC-ENTMCNC: 31.2 PG — SIGNIFICANT CHANGE UP (ref 27–34)
MCHC RBC-ENTMCNC: 31.7 G/DL — LOW (ref 32–36)
MCHC RBC-ENTMCNC: 31.8 G/DL — LOW (ref 32–36)
MCV RBC AUTO: 96.1 FL — SIGNIFICANT CHANGE UP (ref 80–100)
MCV RBC AUTO: 97.9 FL — SIGNIFICANT CHANGE UP (ref 80–100)
NRBC # BLD AUTO: 0 K/UL — SIGNIFICANT CHANGE UP (ref 0–0)
NRBC # BLD AUTO: 0 K/UL — SIGNIFICANT CHANGE UP (ref 0–0)
NRBC # FLD: 0 K/UL — SIGNIFICANT CHANGE UP (ref 0–0)
NRBC # FLD: 0 K/UL — SIGNIFICANT CHANGE UP (ref 0–0)
NRBC BLD AUTO-RTO: 0 /100 WBCS — SIGNIFICANT CHANGE UP (ref 0–0)
NRBC BLD AUTO-RTO: 0 /100 WBCS — SIGNIFICANT CHANGE UP (ref 0–0)
PLATELET # BLD AUTO: 282 K/UL — SIGNIFICANT CHANGE UP (ref 150–400)
PLATELET # BLD AUTO: 300 K/UL — SIGNIFICANT CHANGE UP (ref 150–400)
PMV BLD: 9.4 FL — SIGNIFICANT CHANGE UP (ref 7–13)
PMV BLD: 9.5 FL — SIGNIFICANT CHANGE UP (ref 7–13)
POTASSIUM SERPL-MCNC: 4.4 MMOL/L — SIGNIFICANT CHANGE UP (ref 3.5–5.3)
POTASSIUM SERPL-SCNC: 4.4 MMOL/L — SIGNIFICANT CHANGE UP (ref 3.5–5.3)
PROTHROM AB SERPL-ACNC: 14 SEC — HIGH (ref 9.9–13.4)
RBC # BLD: 2.79 M/UL — LOW (ref 4.2–5.8)
RBC # BLD: 2.92 M/UL — LOW (ref 4.2–5.8)
RBC # FLD: 11.9 % — SIGNIFICANT CHANGE UP (ref 10.3–14.5)
RBC # FLD: 12.2 % — SIGNIFICANT CHANGE UP (ref 10.3–14.5)
RH IG SCN BLD-IMP: POSITIVE — SIGNIFICANT CHANGE UP
SODIUM SERPL-SCNC: 139 MMOL/L — SIGNIFICANT CHANGE UP (ref 135–145)
WBC # BLD: 5.97 K/UL — SIGNIFICANT CHANGE UP (ref 3.8–10.5)
WBC # BLD: 6.46 K/UL — SIGNIFICANT CHANGE UP (ref 3.8–10.5)
WBC # FLD AUTO: 5.97 K/UL — SIGNIFICANT CHANGE UP (ref 3.8–10.5)
WBC # FLD AUTO: 6.46 K/UL — SIGNIFICANT CHANGE UP (ref 3.8–10.5)

## 2025-08-29 PROCEDURE — 84295 ASSAY OF SERUM SODIUM: CPT

## 2025-08-29 PROCEDURE — 83550 IRON BINDING TEST: CPT

## 2025-08-29 PROCEDURE — 85045 AUTOMATED RETICULOCYTE COUNT: CPT

## 2025-08-29 PROCEDURE — 71045 X-RAY EXAM CHEST 1 VIEW: CPT

## 2025-08-29 PROCEDURE — 87205 SMEAR GRAM STAIN: CPT

## 2025-08-29 PROCEDURE — 93005 ELECTROCARDIOGRAM TRACING: CPT

## 2025-08-29 PROCEDURE — 80048 BASIC METABOLIC PNL TOTAL CA: CPT

## 2025-08-29 PROCEDURE — 82947 ASSAY GLUCOSE BLOOD QUANT: CPT

## 2025-08-29 PROCEDURE — 71275 CT ANGIOGRAPHY CHEST: CPT

## 2025-08-29 PROCEDURE — 86850 RBC ANTIBODY SCREEN: CPT

## 2025-08-29 PROCEDURE — 88300 SURGICAL PATH GROSS: CPT

## 2025-08-29 PROCEDURE — 97530 THERAPEUTIC ACTIVITIES: CPT

## 2025-08-29 PROCEDURE — 86355 B CELLS TOTAL COUNT: CPT

## 2025-08-29 PROCEDURE — 87116 MYCOBACTERIA CULTURE: CPT

## 2025-08-29 PROCEDURE — 86359 T CELLS TOTAL COUNT: CPT

## 2025-08-29 PROCEDURE — 85027 COMPLETE CBC AUTOMATED: CPT

## 2025-08-29 PROCEDURE — 97110 THERAPEUTIC EXERCISES: CPT

## 2025-08-29 PROCEDURE — 82728 ASSAY OF FERRITIN: CPT

## 2025-08-29 PROCEDURE — 85379 FIBRIN DEGRADATION QUANT: CPT

## 2025-08-29 PROCEDURE — 97161 PT EVAL LOW COMPLEX 20 MIN: CPT

## 2025-08-29 PROCEDURE — 83540 ASSAY OF IRON: CPT

## 2025-08-29 PROCEDURE — 84466 ASSAY OF TRANSFERRIN: CPT

## 2025-08-29 PROCEDURE — 87070 CULTURE OTHR SPECIMN AEROBIC: CPT

## 2025-08-29 PROCEDURE — 87075 CULTR BACTERIA EXCEPT BLOOD: CPT

## 2025-08-29 PROCEDURE — 87102 FUNGUS ISOLATION CULTURE: CPT

## 2025-08-29 PROCEDURE — 84132 ASSAY OF SERUM POTASSIUM: CPT

## 2025-08-29 PROCEDURE — P9016: CPT

## 2025-08-29 PROCEDURE — 93306 TTE W/DOPPLER COMPLETE: CPT

## 2025-08-29 PROCEDURE — 97165 OT EVAL LOW COMPLEX 30 MIN: CPT

## 2025-08-29 PROCEDURE — 82805 BLOOD GASES W/O2 SATURATION: CPT

## 2025-08-29 PROCEDURE — 86901 BLOOD TYPING SEROLOGIC RH(D): CPT

## 2025-08-29 PROCEDURE — 84484 ASSAY OF TROPONIN QUANT: CPT

## 2025-08-29 PROCEDURE — 86360 T CELL ABSOLUTE COUNT/RATIO: CPT

## 2025-08-29 PROCEDURE — 76000 FLUOROSCOPY <1 HR PHYS/QHP: CPT

## 2025-08-29 PROCEDURE — 86357 NK CELLS TOTAL COUNT: CPT

## 2025-08-29 PROCEDURE — 83880 ASSAY OF NATRIURETIC PEPTIDE: CPT

## 2025-08-29 PROCEDURE — 86923 COMPATIBILITY TEST ELECTRIC: CPT

## 2025-08-29 PROCEDURE — 36430 TRANSFUSION BLD/BLD COMPNT: CPT

## 2025-08-29 PROCEDURE — 85610 PROTHROMBIN TIME: CPT

## 2025-08-29 PROCEDURE — 93970 EXTREMITY STUDY: CPT

## 2025-08-29 PROCEDURE — C9399: CPT

## 2025-08-29 PROCEDURE — 97116 GAIT TRAINING THERAPY: CPT

## 2025-08-29 PROCEDURE — 72170 X-RAY EXAM OF PELVIS: CPT

## 2025-08-29 PROCEDURE — 82330 ASSAY OF CALCIUM: CPT

## 2025-08-29 PROCEDURE — 99232 SBSQ HOSP IP/OBS MODERATE 35: CPT

## 2025-08-29 PROCEDURE — 86900 BLOOD TYPING SEROLOGIC ABO: CPT

## 2025-08-29 PROCEDURE — 36415 COLL VENOUS BLD VENIPUNCTURE: CPT

## 2025-08-29 PROCEDURE — P9040: CPT

## 2025-08-29 RX ORDER — TRAMADOL HYDROCHLORIDE 50 MG/1
50 TABLET, FILM COATED ORAL EVERY 4 HOURS
Refills: 0 | Status: DISCONTINUED | OUTPATIENT
Start: 2025-08-29 | End: 2025-09-03

## 2025-08-29 RX ORDER — TRAMADOL HYDROCHLORIDE 50 MG/1
25 TABLET, FILM COATED ORAL EVERY 4 HOURS
Refills: 0 | Status: DISCONTINUED | OUTPATIENT
Start: 2025-08-29 | End: 2025-09-03

## 2025-08-29 RX ORDER — FERROUS SULFATE 137(45) MG
325 TABLET, EXTENDED RELEASE ORAL
Refills: 0 | Status: DISCONTINUED | OUTPATIENT
Start: 2025-08-30 | End: 2025-09-03

## 2025-08-29 RX ORDER — APIXABAN 2.5 MG/1
5 TABLET, FILM COATED ORAL EVERY 12 HOURS
Refills: 0 | Status: DISCONTINUED | OUTPATIENT
Start: 2025-09-01 | End: 2025-09-03

## 2025-08-29 RX ADMIN — POLYETHYLENE GLYCOL 3350 17 GRAM(S): 17 POWDER, FOR SOLUTION ORAL at 22:23

## 2025-08-29 RX ADMIN — APIXABAN 10 MILLIGRAM(S): 2.5 TABLET, FILM COATED ORAL at 17:40

## 2025-08-29 RX ADMIN — TRAMADOL HYDROCHLORIDE 50 MILLIGRAM(S): 50 TABLET, FILM COATED ORAL at 07:19

## 2025-08-29 RX ADMIN — GABAPENTIN 300 MILLIGRAM(S): 400 CAPSULE ORAL at 06:09

## 2025-08-29 RX ADMIN — Medication 2 TABLET(S): at 22:24

## 2025-08-29 RX ADMIN — TRAMADOL HYDROCHLORIDE 50 MILLIGRAM(S): 50 TABLET, FILM COATED ORAL at 13:16

## 2025-08-29 RX ADMIN — Medication 975 MILLIGRAM(S): at 07:19

## 2025-08-29 RX ADMIN — Medication 975 MILLIGRAM(S): at 06:10

## 2025-08-29 RX ADMIN — TAMSULOSIN HYDROCHLORIDE 0.4 MILLIGRAM(S): 0.4 CAPSULE ORAL at 22:24

## 2025-08-29 RX ADMIN — Medication 5 MILLIGRAM(S): at 06:09

## 2025-08-29 RX ADMIN — FINASTERIDE 5 MILLIGRAM(S): 1 TABLET, FILM COATED ORAL at 22:25

## 2025-08-29 RX ADMIN — TRAMADOL HYDROCHLORIDE 50 MILLIGRAM(S): 50 TABLET, FILM COATED ORAL at 12:16

## 2025-08-29 RX ADMIN — CYCLOBENZAPRINE HYDROCHLORIDE 5 MILLIGRAM(S): 15 CAPSULE, EXTENDED RELEASE ORAL at 22:26

## 2025-08-29 RX ADMIN — LAMIVUDINE 300 MILLIGRAM(S): 10 SOLUTION ORAL at 12:14

## 2025-08-29 RX ADMIN — CYCLOBENZAPRINE HYDROCHLORIDE 5 MILLIGRAM(S): 15 CAPSULE, EXTENDED RELEASE ORAL at 06:09

## 2025-08-29 RX ADMIN — Medication 40 MILLIGRAM(S): at 06:08

## 2025-08-29 RX ADMIN — FLUOXETINE HYDROCHLORIDE 10 MILLIGRAM(S): 20 CAPSULE ORAL at 12:14

## 2025-08-29 RX ADMIN — Medication 5 MILLIGRAM(S): at 17:39

## 2025-08-29 RX ADMIN — APIXABAN 10 MILLIGRAM(S): 2.5 TABLET, FILM COATED ORAL at 06:09

## 2025-08-29 RX ADMIN — Medication 975 MILLIGRAM(S): at 15:23

## 2025-08-29 RX ADMIN — GABAPENTIN 300 MILLIGRAM(S): 400 CAPSULE ORAL at 17:40

## 2025-08-29 RX ADMIN — CYCLOBENZAPRINE HYDROCHLORIDE 5 MILLIGRAM(S): 15 CAPSULE, EXTENDED RELEASE ORAL at 15:23

## 2025-08-29 RX ADMIN — Medication 975 MILLIGRAM(S): at 22:24

## 2025-08-29 RX ADMIN — TRAMADOL HYDROCHLORIDE 50 MILLIGRAM(S): 50 TABLET, FILM COATED ORAL at 06:09

## 2025-08-29 RX ADMIN — DOLUTEGRAVIR SODIUM 50 MILLIGRAM(S): 5 TABLET, FOR SUSPENSION ORAL at 12:14

## 2025-08-29 RX ADMIN — Medication 10 MILLIGRAM(S): at 15:43

## 2025-08-30 LAB
ANION GAP SERPL CALC-SCNC: 7 MMOL/L — SIGNIFICANT CHANGE UP (ref 5–17)
BUN SERPL-MCNC: 18 MG/DL — SIGNIFICANT CHANGE UP (ref 7–23)
CALCIUM SERPL-MCNC: 9.1 MG/DL — SIGNIFICANT CHANGE UP (ref 8.4–10.5)
CHLORIDE SERPL-SCNC: 102 MMOL/L — SIGNIFICANT CHANGE UP (ref 96–108)
CO2 SERPL-SCNC: 27 MMOL/L — SIGNIFICANT CHANGE UP (ref 22–31)
CREAT SERPL-MCNC: 1.53 MG/DL — HIGH (ref 0.5–1.3)
EGFR: 50 ML/MIN/1.73M2 — LOW
EGFR: 50 ML/MIN/1.73M2 — LOW
GLUCOSE SERPL-MCNC: 89 MG/DL — SIGNIFICANT CHANGE UP (ref 70–99)
HCT VFR BLD CALC: 26.9 % — LOW (ref 39–50)
HGB BLD-MCNC: 8.6 G/DL — LOW (ref 13–17)
MCHC RBC-ENTMCNC: 30.5 PG — SIGNIFICANT CHANGE UP (ref 27–34)
MCHC RBC-ENTMCNC: 32 G/DL — SIGNIFICANT CHANGE UP (ref 32–36)
MCV RBC AUTO: 95.4 FL — SIGNIFICANT CHANGE UP (ref 80–100)
NRBC # BLD AUTO: 0 K/UL — SIGNIFICANT CHANGE UP (ref 0–0)
NRBC # FLD: 0 K/UL — SIGNIFICANT CHANGE UP (ref 0–0)
NRBC BLD AUTO-RTO: 0 /100 WBCS — SIGNIFICANT CHANGE UP (ref 0–0)
PLATELET # BLD AUTO: 339 K/UL — SIGNIFICANT CHANGE UP (ref 150–400)
PMV BLD: 9.5 FL — SIGNIFICANT CHANGE UP (ref 7–13)
POTASSIUM SERPL-MCNC: 4 MMOL/L — SIGNIFICANT CHANGE UP (ref 3.5–5.3)
POTASSIUM SERPL-SCNC: 4 MMOL/L — SIGNIFICANT CHANGE UP (ref 3.5–5.3)
RBC # BLD: 2.82 M/UL — LOW (ref 4.2–5.8)
RBC # FLD: 12.2 % — SIGNIFICANT CHANGE UP (ref 10.3–14.5)
SODIUM SERPL-SCNC: 136 MMOL/L — SIGNIFICANT CHANGE UP (ref 135–145)
WBC # BLD: 6.45 K/UL — SIGNIFICANT CHANGE UP (ref 3.8–10.5)
WBC # FLD AUTO: 6.45 K/UL — SIGNIFICANT CHANGE UP (ref 3.8–10.5)

## 2025-08-30 PROCEDURE — 36430 TRANSFUSION BLD/BLD COMPNT: CPT

## 2025-08-30 PROCEDURE — 97116 GAIT TRAINING THERAPY: CPT

## 2025-08-30 PROCEDURE — 86359 T CELLS TOTAL COUNT: CPT

## 2025-08-30 PROCEDURE — 85610 PROTHROMBIN TIME: CPT

## 2025-08-30 PROCEDURE — 87205 SMEAR GRAM STAIN: CPT

## 2025-08-30 PROCEDURE — 82805 BLOOD GASES W/O2 SATURATION: CPT

## 2025-08-30 PROCEDURE — 84466 ASSAY OF TRANSFERRIN: CPT

## 2025-08-30 PROCEDURE — 82330 ASSAY OF CALCIUM: CPT

## 2025-08-30 PROCEDURE — 71045 X-RAY EXAM CHEST 1 VIEW: CPT

## 2025-08-30 PROCEDURE — 93005 ELECTROCARDIOGRAM TRACING: CPT

## 2025-08-30 PROCEDURE — 84295 ASSAY OF SERUM SODIUM: CPT

## 2025-08-30 PROCEDURE — 99232 SBSQ HOSP IP/OBS MODERATE 35: CPT

## 2025-08-30 PROCEDURE — 87075 CULTR BACTERIA EXCEPT BLOOD: CPT

## 2025-08-30 PROCEDURE — 86923 COMPATIBILITY TEST ELECTRIC: CPT

## 2025-08-30 PROCEDURE — 87116 MYCOBACTERIA CULTURE: CPT

## 2025-08-30 PROCEDURE — 82728 ASSAY OF FERRITIN: CPT

## 2025-08-30 PROCEDURE — 36415 COLL VENOUS BLD VENIPUNCTURE: CPT

## 2025-08-30 PROCEDURE — 86360 T CELL ABSOLUTE COUNT/RATIO: CPT

## 2025-08-30 PROCEDURE — 97530 THERAPEUTIC ACTIVITIES: CPT

## 2025-08-30 PROCEDURE — 86357 NK CELLS TOTAL COUNT: CPT

## 2025-08-30 PROCEDURE — 85379 FIBRIN DEGRADATION QUANT: CPT

## 2025-08-30 PROCEDURE — 76000 FLUOROSCOPY <1 HR PHYS/QHP: CPT

## 2025-08-30 PROCEDURE — 83550 IRON BINDING TEST: CPT

## 2025-08-30 PROCEDURE — 85045 AUTOMATED RETICULOCYTE COUNT: CPT

## 2025-08-30 PROCEDURE — 84484 ASSAY OF TROPONIN QUANT: CPT

## 2025-08-30 PROCEDURE — 80048 BASIC METABOLIC PNL TOTAL CA: CPT

## 2025-08-30 PROCEDURE — 86901 BLOOD TYPING SEROLOGIC RH(D): CPT

## 2025-08-30 PROCEDURE — 83880 ASSAY OF NATRIURETIC PEPTIDE: CPT

## 2025-08-30 PROCEDURE — P9040: CPT

## 2025-08-30 PROCEDURE — 82947 ASSAY GLUCOSE BLOOD QUANT: CPT

## 2025-08-30 PROCEDURE — 97161 PT EVAL LOW COMPLEX 20 MIN: CPT

## 2025-08-30 PROCEDURE — 85027 COMPLETE CBC AUTOMATED: CPT

## 2025-08-30 PROCEDURE — 93306 TTE W/DOPPLER COMPLETE: CPT

## 2025-08-30 PROCEDURE — 86850 RBC ANTIBODY SCREEN: CPT

## 2025-08-30 PROCEDURE — C9399: CPT

## 2025-08-30 PROCEDURE — 87070 CULTURE OTHR SPECIMN AEROBIC: CPT

## 2025-08-30 PROCEDURE — 86355 B CELLS TOTAL COUNT: CPT

## 2025-08-30 PROCEDURE — 72170 X-RAY EXAM OF PELVIS: CPT

## 2025-08-30 PROCEDURE — 97165 OT EVAL LOW COMPLEX 30 MIN: CPT

## 2025-08-30 PROCEDURE — P9016: CPT

## 2025-08-30 PROCEDURE — 87102 FUNGUS ISOLATION CULTURE: CPT

## 2025-08-30 PROCEDURE — 83540 ASSAY OF IRON: CPT

## 2025-08-30 PROCEDURE — 71275 CT ANGIOGRAPHY CHEST: CPT

## 2025-08-30 PROCEDURE — 84132 ASSAY OF SERUM POTASSIUM: CPT

## 2025-08-30 PROCEDURE — 97110 THERAPEUTIC EXERCISES: CPT

## 2025-08-30 PROCEDURE — 88300 SURGICAL PATH GROSS: CPT

## 2025-08-30 PROCEDURE — 86900 BLOOD TYPING SEROLOGIC ABO: CPT

## 2025-08-30 PROCEDURE — 93970 EXTREMITY STUDY: CPT

## 2025-08-30 RX ORDER — TRAZODONE HCL 100 MG
50 TABLET ORAL AT BEDTIME
Refills: 0 | Status: DISCONTINUED | OUTPATIENT
Start: 2025-08-30 | End: 2025-09-03

## 2025-08-30 RX ADMIN — Medication 975 MILLIGRAM(S): at 05:35

## 2025-08-30 RX ADMIN — CYCLOBENZAPRINE HYDROCHLORIDE 5 MILLIGRAM(S): 15 CAPSULE, EXTENDED RELEASE ORAL at 15:31

## 2025-08-30 RX ADMIN — APIXABAN 10 MILLIGRAM(S): 2.5 TABLET, FILM COATED ORAL at 05:36

## 2025-08-30 RX ADMIN — CYCLOBENZAPRINE HYDROCHLORIDE 5 MILLIGRAM(S): 15 CAPSULE, EXTENDED RELEASE ORAL at 21:15

## 2025-08-30 RX ADMIN — Medication 5 MILLIGRAM(S): at 05:36

## 2025-08-30 RX ADMIN — FLUOXETINE HYDROCHLORIDE 10 MILLIGRAM(S): 20 CAPSULE ORAL at 12:43

## 2025-08-30 RX ADMIN — CYCLOBENZAPRINE HYDROCHLORIDE 5 MILLIGRAM(S): 15 CAPSULE, EXTENDED RELEASE ORAL at 05:35

## 2025-08-30 RX ADMIN — GABAPENTIN 300 MILLIGRAM(S): 400 CAPSULE ORAL at 17:13

## 2025-08-30 RX ADMIN — FINASTERIDE 5 MILLIGRAM(S): 1 TABLET, FILM COATED ORAL at 21:15

## 2025-08-30 RX ADMIN — APIXABAN 10 MILLIGRAM(S): 2.5 TABLET, FILM COATED ORAL at 17:13

## 2025-08-30 RX ADMIN — Medication 40 MILLIGRAM(S): at 07:02

## 2025-08-30 RX ADMIN — Medication 975 MILLIGRAM(S): at 15:31

## 2025-08-30 RX ADMIN — GABAPENTIN 300 MILLIGRAM(S): 400 CAPSULE ORAL at 05:37

## 2025-08-30 RX ADMIN — DOLUTEGRAVIR SODIUM 50 MILLIGRAM(S): 5 TABLET, FOR SUSPENSION ORAL at 12:42

## 2025-08-30 RX ADMIN — TAMSULOSIN HYDROCHLORIDE 0.4 MILLIGRAM(S): 0.4 CAPSULE ORAL at 21:15

## 2025-08-30 RX ADMIN — LAMIVUDINE 300 MILLIGRAM(S): 10 SOLUTION ORAL at 12:42

## 2025-08-30 RX ADMIN — Medication 325 MILLIGRAM(S): at 12:43

## 2025-08-30 RX ADMIN — Medication 975 MILLIGRAM(S): at 21:15

## 2025-08-30 RX ADMIN — Medication 5 MILLIGRAM(S): at 17:13

## 2025-08-31 LAB
ANION GAP SERPL CALC-SCNC: 10 MMOL/L — SIGNIFICANT CHANGE UP (ref 5–17)
BUN SERPL-MCNC: 17 MG/DL — SIGNIFICANT CHANGE UP (ref 7–23)
CALCIUM SERPL-MCNC: 9.4 MG/DL — SIGNIFICANT CHANGE UP (ref 8.4–10.5)
CHLORIDE SERPL-SCNC: 102 MMOL/L — SIGNIFICANT CHANGE UP (ref 96–108)
CO2 SERPL-SCNC: 26 MMOL/L — SIGNIFICANT CHANGE UP (ref 22–31)
CREAT SERPL-MCNC: 1.61 MG/DL — HIGH (ref 0.5–1.3)
EGFR: 47 ML/MIN/1.73M2 — LOW
EGFR: 47 ML/MIN/1.73M2 — LOW
GLUCOSE SERPL-MCNC: 97 MG/DL — SIGNIFICANT CHANGE UP (ref 70–99)
HCT VFR BLD CALC: 29.7 % — LOW (ref 39–50)
HGB BLD-MCNC: 9.4 G/DL — LOW (ref 13–17)
MCHC RBC-ENTMCNC: 30.4 PG — SIGNIFICANT CHANGE UP (ref 27–34)
MCHC RBC-ENTMCNC: 31.6 G/DL — LOW (ref 32–36)
MCV RBC AUTO: 96.1 FL — SIGNIFICANT CHANGE UP (ref 80–100)
NRBC # BLD AUTO: 0 K/UL — SIGNIFICANT CHANGE UP (ref 0–0)
NRBC # FLD: 0 K/UL — SIGNIFICANT CHANGE UP (ref 0–0)
NRBC BLD AUTO-RTO: 0 /100 WBCS — SIGNIFICANT CHANGE UP (ref 0–0)
PLATELET # BLD AUTO: 373 K/UL — SIGNIFICANT CHANGE UP (ref 150–400)
PMV BLD: 9.3 FL — SIGNIFICANT CHANGE UP (ref 7–13)
POTASSIUM SERPL-MCNC: 4 MMOL/L — SIGNIFICANT CHANGE UP (ref 3.5–5.3)
POTASSIUM SERPL-SCNC: 4 MMOL/L — SIGNIFICANT CHANGE UP (ref 3.5–5.3)
RBC # BLD: 3.09 M/UL — LOW (ref 4.2–5.8)
RBC # FLD: 12.5 % — SIGNIFICANT CHANGE UP (ref 10.3–14.5)
SODIUM SERPL-SCNC: 138 MMOL/L — SIGNIFICANT CHANGE UP (ref 135–145)
WBC # BLD: 5.94 K/UL — SIGNIFICANT CHANGE UP (ref 3.8–10.5)
WBC # FLD AUTO: 5.94 K/UL — SIGNIFICANT CHANGE UP (ref 3.8–10.5)

## 2025-08-31 PROCEDURE — 84466 ASSAY OF TRANSFERRIN: CPT

## 2025-08-31 PROCEDURE — 76000 FLUOROSCOPY <1 HR PHYS/QHP: CPT

## 2025-08-31 PROCEDURE — 83550 IRON BINDING TEST: CPT

## 2025-08-31 PROCEDURE — 87116 MYCOBACTERIA CULTURE: CPT

## 2025-08-31 PROCEDURE — 97116 GAIT TRAINING THERAPY: CPT

## 2025-08-31 PROCEDURE — C9399: CPT

## 2025-08-31 PROCEDURE — 72170 X-RAY EXAM OF PELVIS: CPT

## 2025-08-31 PROCEDURE — 84295 ASSAY OF SERUM SODIUM: CPT

## 2025-08-31 PROCEDURE — 99232 SBSQ HOSP IP/OBS MODERATE 35: CPT

## 2025-08-31 PROCEDURE — 93970 EXTREMITY STUDY: CPT

## 2025-08-31 PROCEDURE — 87102 FUNGUS ISOLATION CULTURE: CPT

## 2025-08-31 PROCEDURE — 86901 BLOOD TYPING SEROLOGIC RH(D): CPT

## 2025-08-31 PROCEDURE — 97161 PT EVAL LOW COMPLEX 20 MIN: CPT

## 2025-08-31 PROCEDURE — 85610 PROTHROMBIN TIME: CPT

## 2025-08-31 PROCEDURE — 97530 THERAPEUTIC ACTIVITIES: CPT

## 2025-08-31 PROCEDURE — P9040: CPT

## 2025-08-31 PROCEDURE — 85045 AUTOMATED RETICULOCYTE COUNT: CPT

## 2025-08-31 PROCEDURE — 36430 TRANSFUSION BLD/BLD COMPNT: CPT

## 2025-08-31 PROCEDURE — 83540 ASSAY OF IRON: CPT

## 2025-08-31 PROCEDURE — 93306 TTE W/DOPPLER COMPLETE: CPT

## 2025-08-31 PROCEDURE — 86355 B CELLS TOTAL COUNT: CPT

## 2025-08-31 PROCEDURE — 86850 RBC ANTIBODY SCREEN: CPT

## 2025-08-31 PROCEDURE — 97165 OT EVAL LOW COMPLEX 30 MIN: CPT

## 2025-08-31 PROCEDURE — 83880 ASSAY OF NATRIURETIC PEPTIDE: CPT

## 2025-08-31 PROCEDURE — 86359 T CELLS TOTAL COUNT: CPT

## 2025-08-31 PROCEDURE — 82947 ASSAY GLUCOSE BLOOD QUANT: CPT

## 2025-08-31 PROCEDURE — C1776: CPT

## 2025-08-31 PROCEDURE — C1713: CPT

## 2025-08-31 PROCEDURE — C1769: CPT

## 2025-08-31 PROCEDURE — 93005 ELECTROCARDIOGRAM TRACING: CPT

## 2025-08-31 PROCEDURE — 86357 NK CELLS TOTAL COUNT: CPT

## 2025-08-31 PROCEDURE — 85379 FIBRIN DEGRADATION QUANT: CPT

## 2025-08-31 PROCEDURE — 82330 ASSAY OF CALCIUM: CPT

## 2025-08-31 PROCEDURE — 86923 COMPATIBILITY TEST ELECTRIC: CPT

## 2025-08-31 PROCEDURE — 87075 CULTR BACTERIA EXCEPT BLOOD: CPT

## 2025-08-31 PROCEDURE — 36415 COLL VENOUS BLD VENIPUNCTURE: CPT

## 2025-08-31 PROCEDURE — 87205 SMEAR GRAM STAIN: CPT

## 2025-08-31 PROCEDURE — 86360 T CELL ABSOLUTE COUNT/RATIO: CPT

## 2025-08-31 PROCEDURE — 84132 ASSAY OF SERUM POTASSIUM: CPT

## 2025-08-31 PROCEDURE — 85027 COMPLETE CBC AUTOMATED: CPT

## 2025-08-31 PROCEDURE — P9016: CPT

## 2025-08-31 PROCEDURE — C1889: CPT

## 2025-08-31 PROCEDURE — 80048 BASIC METABOLIC PNL TOTAL CA: CPT

## 2025-08-31 PROCEDURE — 71275 CT ANGIOGRAPHY CHEST: CPT

## 2025-08-31 PROCEDURE — 84484 ASSAY OF TROPONIN QUANT: CPT

## 2025-08-31 PROCEDURE — 82728 ASSAY OF FERRITIN: CPT

## 2025-08-31 PROCEDURE — 82805 BLOOD GASES W/O2 SATURATION: CPT

## 2025-08-31 PROCEDURE — 87070 CULTURE OTHR SPECIMN AEROBIC: CPT

## 2025-08-31 PROCEDURE — 97110 THERAPEUTIC EXERCISES: CPT

## 2025-08-31 PROCEDURE — 71045 X-RAY EXAM CHEST 1 VIEW: CPT

## 2025-08-31 PROCEDURE — 88300 SURGICAL PATH GROSS: CPT

## 2025-08-31 PROCEDURE — 86900 BLOOD TYPING SEROLOGIC ABO: CPT

## 2025-08-31 RX ADMIN — GABAPENTIN 300 MILLIGRAM(S): 400 CAPSULE ORAL at 05:08

## 2025-08-31 RX ADMIN — Medication 975 MILLIGRAM(S): at 21:15

## 2025-08-31 RX ADMIN — GABAPENTIN 300 MILLIGRAM(S): 400 CAPSULE ORAL at 18:04

## 2025-08-31 RX ADMIN — APIXABAN 10 MILLIGRAM(S): 2.5 TABLET, FILM COATED ORAL at 18:05

## 2025-08-31 RX ADMIN — Medication 975 MILLIGRAM(S): at 14:39

## 2025-08-31 RX ADMIN — TAMSULOSIN HYDROCHLORIDE 0.4 MILLIGRAM(S): 0.4 CAPSULE ORAL at 21:15

## 2025-08-31 RX ADMIN — CYCLOBENZAPRINE HYDROCHLORIDE 5 MILLIGRAM(S): 15 CAPSULE, EXTENDED RELEASE ORAL at 14:40

## 2025-08-31 RX ADMIN — FINASTERIDE 5 MILLIGRAM(S): 1 TABLET, FILM COATED ORAL at 21:15

## 2025-08-31 RX ADMIN — Medication 975 MILLIGRAM(S): at 05:08

## 2025-08-31 RX ADMIN — Medication 40 MILLIGRAM(S): at 05:09

## 2025-08-31 RX ADMIN — LAMIVUDINE 300 MILLIGRAM(S): 10 SOLUTION ORAL at 12:21

## 2025-08-31 RX ADMIN — DOLUTEGRAVIR SODIUM 50 MILLIGRAM(S): 5 TABLET, FOR SUSPENSION ORAL at 12:21

## 2025-08-31 RX ADMIN — CYCLOBENZAPRINE HYDROCHLORIDE 5 MILLIGRAM(S): 15 CAPSULE, EXTENDED RELEASE ORAL at 21:15

## 2025-08-31 RX ADMIN — CYCLOBENZAPRINE HYDROCHLORIDE 5 MILLIGRAM(S): 15 CAPSULE, EXTENDED RELEASE ORAL at 05:08

## 2025-08-31 RX ADMIN — APIXABAN 10 MILLIGRAM(S): 2.5 TABLET, FILM COATED ORAL at 05:09

## 2025-08-31 RX ADMIN — FLUOXETINE HYDROCHLORIDE 10 MILLIGRAM(S): 20 CAPSULE ORAL at 12:20

## 2025-09-01 LAB
ANION GAP SERPL CALC-SCNC: 8 MMOL/L — SIGNIFICANT CHANGE UP (ref 5–17)
BLD GP AB SCN SERPL QL: NEGATIVE — SIGNIFICANT CHANGE UP
BUN SERPL-MCNC: 16 MG/DL — SIGNIFICANT CHANGE UP (ref 7–23)
CALCIUM SERPL-MCNC: 9.3 MG/DL — SIGNIFICANT CHANGE UP (ref 8.4–10.5)
CHLORIDE SERPL-SCNC: 103 MMOL/L — SIGNIFICANT CHANGE UP (ref 96–108)
CO2 SERPL-SCNC: 26 MMOL/L — SIGNIFICANT CHANGE UP (ref 22–31)
CREAT SERPL-MCNC: 1.63 MG/DL — HIGH (ref 0.5–1.3)
EGFR: 46 ML/MIN/1.73M2 — LOW
EGFR: 46 ML/MIN/1.73M2 — LOW
GLUCOSE SERPL-MCNC: 92 MG/DL — SIGNIFICANT CHANGE UP (ref 70–99)
HCT VFR BLD CALC: 29.4 % — LOW (ref 39–50)
HGB BLD-MCNC: 9.3 G/DL — LOW (ref 13–17)
MCHC RBC-ENTMCNC: 30.5 PG — SIGNIFICANT CHANGE UP (ref 27–34)
MCHC RBC-ENTMCNC: 31.6 G/DL — LOW (ref 32–36)
MCV RBC AUTO: 96.4 FL — SIGNIFICANT CHANGE UP (ref 80–100)
NRBC # BLD AUTO: 0 K/UL — SIGNIFICANT CHANGE UP (ref 0–0)
NRBC # FLD: 0 K/UL — SIGNIFICANT CHANGE UP (ref 0–0)
NRBC BLD AUTO-RTO: 0 /100 WBCS — SIGNIFICANT CHANGE UP (ref 0–0)
PLATELET # BLD AUTO: 389 K/UL — SIGNIFICANT CHANGE UP (ref 150–400)
PMV BLD: 9.3 FL — SIGNIFICANT CHANGE UP (ref 7–13)
POTASSIUM SERPL-MCNC: 4.4 MMOL/L — SIGNIFICANT CHANGE UP (ref 3.5–5.3)
POTASSIUM SERPL-SCNC: 4.4 MMOL/L — SIGNIFICANT CHANGE UP (ref 3.5–5.3)
RBC # BLD: 3.05 M/UL — LOW (ref 4.2–5.8)
RBC # FLD: 12.8 % — SIGNIFICANT CHANGE UP (ref 10.3–14.5)
RH IG SCN BLD-IMP: POSITIVE — SIGNIFICANT CHANGE UP
SODIUM SERPL-SCNC: 137 MMOL/L — SIGNIFICANT CHANGE UP (ref 135–145)
WBC # BLD: 6.66 K/UL — SIGNIFICANT CHANGE UP (ref 3.8–10.5)
WBC # FLD AUTO: 6.66 K/UL — SIGNIFICANT CHANGE UP (ref 3.8–10.5)

## 2025-09-01 PROCEDURE — 86359 T CELLS TOTAL COUNT: CPT

## 2025-09-01 PROCEDURE — C1713: CPT

## 2025-09-01 PROCEDURE — 82947 ASSAY GLUCOSE BLOOD QUANT: CPT

## 2025-09-01 PROCEDURE — 86360 T CELL ABSOLUTE COUNT/RATIO: CPT

## 2025-09-01 PROCEDURE — 97110 THERAPEUTIC EXERCISES: CPT

## 2025-09-01 PROCEDURE — 82728 ASSAY OF FERRITIN: CPT

## 2025-09-01 PROCEDURE — 36415 COLL VENOUS BLD VENIPUNCTURE: CPT

## 2025-09-01 PROCEDURE — 83540 ASSAY OF IRON: CPT

## 2025-09-01 PROCEDURE — 71045 X-RAY EXAM CHEST 1 VIEW: CPT

## 2025-09-01 PROCEDURE — 85045 AUTOMATED RETICULOCYTE COUNT: CPT

## 2025-09-01 PROCEDURE — 86923 COMPATIBILITY TEST ELECTRIC: CPT

## 2025-09-01 PROCEDURE — P9040: CPT

## 2025-09-01 PROCEDURE — 76000 FLUOROSCOPY <1 HR PHYS/QHP: CPT

## 2025-09-01 PROCEDURE — 84132 ASSAY OF SERUM POTASSIUM: CPT

## 2025-09-01 PROCEDURE — 86357 NK CELLS TOTAL COUNT: CPT

## 2025-09-01 PROCEDURE — C1769: CPT

## 2025-09-01 PROCEDURE — 87116 MYCOBACTERIA CULTURE: CPT

## 2025-09-01 PROCEDURE — C1776: CPT

## 2025-09-01 PROCEDURE — 85027 COMPLETE CBC AUTOMATED: CPT

## 2025-09-01 PROCEDURE — 72170 X-RAY EXAM OF PELVIS: CPT

## 2025-09-01 PROCEDURE — 97161 PT EVAL LOW COMPLEX 20 MIN: CPT

## 2025-09-01 PROCEDURE — 87075 CULTR BACTERIA EXCEPT BLOOD: CPT

## 2025-09-01 PROCEDURE — 82805 BLOOD GASES W/O2 SATURATION: CPT

## 2025-09-01 PROCEDURE — 82330 ASSAY OF CALCIUM: CPT

## 2025-09-01 PROCEDURE — 87070 CULTURE OTHR SPECIMN AEROBIC: CPT

## 2025-09-01 PROCEDURE — 99232 SBSQ HOSP IP/OBS MODERATE 35: CPT

## 2025-09-01 PROCEDURE — 85610 PROTHROMBIN TIME: CPT

## 2025-09-01 PROCEDURE — 83880 ASSAY OF NATRIURETIC PEPTIDE: CPT

## 2025-09-01 PROCEDURE — 83550 IRON BINDING TEST: CPT

## 2025-09-01 PROCEDURE — 85379 FIBRIN DEGRADATION QUANT: CPT

## 2025-09-01 PROCEDURE — P9016: CPT

## 2025-09-01 PROCEDURE — C1889: CPT

## 2025-09-01 PROCEDURE — 73502 X-RAY EXAM HIP UNI 2-3 VIEWS: CPT

## 2025-09-01 PROCEDURE — 84466 ASSAY OF TRANSFERRIN: CPT

## 2025-09-01 PROCEDURE — 71275 CT ANGIOGRAPHY CHEST: CPT

## 2025-09-01 PROCEDURE — 93005 ELECTROCARDIOGRAM TRACING: CPT

## 2025-09-01 PROCEDURE — 86901 BLOOD TYPING SEROLOGIC RH(D): CPT

## 2025-09-01 PROCEDURE — 36430 TRANSFUSION BLD/BLD COMPNT: CPT

## 2025-09-01 PROCEDURE — 86900 BLOOD TYPING SEROLOGIC ABO: CPT

## 2025-09-01 PROCEDURE — 84295 ASSAY OF SERUM SODIUM: CPT

## 2025-09-01 PROCEDURE — 97530 THERAPEUTIC ACTIVITIES: CPT

## 2025-09-01 PROCEDURE — 86850 RBC ANTIBODY SCREEN: CPT

## 2025-09-01 PROCEDURE — 97165 OT EVAL LOW COMPLEX 30 MIN: CPT

## 2025-09-01 PROCEDURE — 80048 BASIC METABOLIC PNL TOTAL CA: CPT

## 2025-09-01 PROCEDURE — 93970 EXTREMITY STUDY: CPT

## 2025-09-01 PROCEDURE — 88300 SURGICAL PATH GROSS: CPT

## 2025-09-01 PROCEDURE — 93306 TTE W/DOPPLER COMPLETE: CPT

## 2025-09-01 PROCEDURE — 84484 ASSAY OF TROPONIN QUANT: CPT

## 2025-09-01 PROCEDURE — 87102 FUNGUS ISOLATION CULTURE: CPT

## 2025-09-01 PROCEDURE — 87205 SMEAR GRAM STAIN: CPT

## 2025-09-01 PROCEDURE — 73552 X-RAY EXAM OF FEMUR 2/>: CPT

## 2025-09-01 PROCEDURE — C9399: CPT

## 2025-09-01 PROCEDURE — 97116 GAIT TRAINING THERAPY: CPT

## 2025-09-01 PROCEDURE — 86355 B CELLS TOTAL COUNT: CPT

## 2025-09-01 RX ORDER — MINERAL OIL
133 OIL (ML) MISCELLANEOUS ONCE
Refills: 0 | Status: DISCONTINUED | OUTPATIENT
Start: 2025-09-01 | End: 2025-09-03

## 2025-09-01 RX ADMIN — LAMIVUDINE 300 MILLIGRAM(S): 10 SOLUTION ORAL at 11:27

## 2025-09-01 RX ADMIN — FLUOXETINE HYDROCHLORIDE 10 MILLIGRAM(S): 20 CAPSULE ORAL at 11:27

## 2025-09-01 RX ADMIN — APIXABAN 5 MILLIGRAM(S): 2.5 TABLET, FILM COATED ORAL at 17:19

## 2025-09-01 RX ADMIN — Medication 975 MILLIGRAM(S): at 05:24

## 2025-09-01 RX ADMIN — TAMSULOSIN HYDROCHLORIDE 0.4 MILLIGRAM(S): 0.4 CAPSULE ORAL at 21:04

## 2025-09-01 RX ADMIN — Medication 40 MILLIGRAM(S): at 05:24

## 2025-09-01 RX ADMIN — GABAPENTIN 300 MILLIGRAM(S): 400 CAPSULE ORAL at 05:24

## 2025-09-01 RX ADMIN — Medication 325 MILLIGRAM(S): at 11:27

## 2025-09-01 RX ADMIN — FINASTERIDE 5 MILLIGRAM(S): 1 TABLET, FILM COATED ORAL at 21:04

## 2025-09-01 RX ADMIN — CYCLOBENZAPRINE HYDROCHLORIDE 5 MILLIGRAM(S): 15 CAPSULE, EXTENDED RELEASE ORAL at 13:58

## 2025-09-01 RX ADMIN — GABAPENTIN 300 MILLIGRAM(S): 400 CAPSULE ORAL at 17:19

## 2025-09-01 RX ADMIN — CYCLOBENZAPRINE HYDROCHLORIDE 5 MILLIGRAM(S): 15 CAPSULE, EXTENDED RELEASE ORAL at 21:04

## 2025-09-01 RX ADMIN — Medication 50 MILLIGRAM(S): at 21:09

## 2025-09-01 RX ADMIN — Medication 975 MILLIGRAM(S): at 13:58

## 2025-09-01 RX ADMIN — DOLUTEGRAVIR SODIUM 50 MILLIGRAM(S): 5 TABLET, FOR SUSPENSION ORAL at 11:27

## 2025-09-01 RX ADMIN — Medication 5 MILLIGRAM(S): at 17:19

## 2025-09-01 RX ADMIN — CYCLOBENZAPRINE HYDROCHLORIDE 5 MILLIGRAM(S): 15 CAPSULE, EXTENDED RELEASE ORAL at 05:24

## 2025-09-01 RX ADMIN — Medication 975 MILLIGRAM(S): at 21:04

## 2025-09-01 RX ADMIN — APIXABAN 10 MILLIGRAM(S): 2.5 TABLET, FILM COATED ORAL at 05:20

## 2025-09-02 LAB
ANION GAP SERPL CALC-SCNC: 10 MMOL/L — SIGNIFICANT CHANGE UP (ref 5–17)
BUN SERPL-MCNC: 15 MG/DL — SIGNIFICANT CHANGE UP (ref 7–23)
CALCIUM SERPL-MCNC: 9.3 MG/DL — SIGNIFICANT CHANGE UP (ref 8.4–10.5)
CHLORIDE SERPL-SCNC: 106 MMOL/L — SIGNIFICANT CHANGE UP (ref 96–108)
CO2 SERPL-SCNC: 22 MMOL/L — SIGNIFICANT CHANGE UP (ref 22–31)
CREAT SERPL-MCNC: 1.53 MG/DL — HIGH (ref 0.5–1.3)
EGFR: 50 ML/MIN/1.73M2 — LOW
EGFR: 50 ML/MIN/1.73M2 — LOW
GLUCOSE SERPL-MCNC: 94 MG/DL — SIGNIFICANT CHANGE UP (ref 70–99)
HCT VFR BLD CALC: 31.5 % — LOW (ref 39–50)
HGB BLD-MCNC: 9.7 G/DL — LOW (ref 13–17)
MCHC RBC-ENTMCNC: 29.9 PG — SIGNIFICANT CHANGE UP (ref 27–34)
MCHC RBC-ENTMCNC: 30.8 G/DL — LOW (ref 32–36)
MCV RBC AUTO: 97.2 FL — SIGNIFICANT CHANGE UP (ref 80–100)
NRBC # BLD AUTO: 0 K/UL — SIGNIFICANT CHANGE UP (ref 0–0)
NRBC # FLD: 0 K/UL — SIGNIFICANT CHANGE UP (ref 0–0)
NRBC BLD AUTO-RTO: 0 /100 WBCS — SIGNIFICANT CHANGE UP (ref 0–0)
PLATELET # BLD AUTO: 423 K/UL — HIGH (ref 150–400)
PMV BLD: 9.1 FL — SIGNIFICANT CHANGE UP (ref 7–13)
POTASSIUM SERPL-MCNC: 4.1 MMOL/L — SIGNIFICANT CHANGE UP (ref 3.5–5.3)
POTASSIUM SERPL-SCNC: 4.1 MMOL/L — SIGNIFICANT CHANGE UP (ref 3.5–5.3)
RBC # BLD: 3.24 M/UL — LOW (ref 4.2–5.8)
RBC # FLD: 12.6 % — SIGNIFICANT CHANGE UP (ref 10.3–14.5)
SODIUM SERPL-SCNC: 138 MMOL/L — SIGNIFICANT CHANGE UP (ref 135–145)
WBC # BLD: 6.06 K/UL — SIGNIFICANT CHANGE UP (ref 3.8–10.5)
WBC # FLD AUTO: 6.06 K/UL — SIGNIFICANT CHANGE UP (ref 3.8–10.5)

## 2025-09-02 PROCEDURE — 99233 SBSQ HOSP IP/OBS HIGH 50: CPT

## 2025-09-02 RX ORDER — OXYCODONE HYDROCHLORIDE AND ACETAMINOPHEN 10; 325 MG/1; MG/1
1 TABLET ORAL
Refills: 0 | DISCHARGE

## 2025-09-02 RX ORDER — ACETAMINOPHEN 500 MG/5ML
2 LIQUID (ML) ORAL
Qty: 42 | Refills: 0
Start: 2025-09-02 | End: 2025-09-08

## 2025-09-02 RX ORDER — CYCLOBENZAPRINE HYDROCHLORIDE 15 MG/1
1 CAPSULE, EXTENDED RELEASE ORAL
Qty: 21 | Refills: 0
Start: 2025-09-02 | End: 2025-09-08

## 2025-09-02 RX ORDER — APIXABAN 2.5 MG/1
1 TABLET, FILM COATED ORAL
Qty: 180 | Refills: 0
Start: 2025-09-02 | End: 2025-11-30

## 2025-09-02 RX ORDER — NALOXONE HYDROCHLORIDE 0.4 MG/ML
1 INJECTION, SOLUTION INTRAMUSCULAR; INTRAVENOUS; SUBCUTANEOUS
Qty: 1 | Refills: 0
Start: 2025-09-02

## 2025-09-02 RX ORDER — NALOXONE HYDROCHLORIDE 0.4 MG/ML
1 INJECTION, SOLUTION INTRAMUSCULAR; INTRAVENOUS; SUBCUTANEOUS
Qty: 1 | Refills: 0
Start: 2025-09-02 | End: 2025-09-02

## 2025-09-02 RX ORDER — GABAPENTIN 400 MG/1
1 CAPSULE ORAL
Qty: 21 | Refills: 0
Start: 2025-09-02 | End: 2025-09-08

## 2025-09-02 RX ORDER — FERROUS SULFATE 137(45) MG
1 TABLET, EXTENDED RELEASE ORAL
Qty: 15 | Refills: 0
Start: 2025-09-02 | End: 2025-10-01

## 2025-09-02 RX ORDER — TRAMADOL HYDROCHLORIDE 50 MG/1
1 TABLET, FILM COATED ORAL
Qty: 30 | Refills: 0
Start: 2025-09-02 | End: 2025-09-06

## 2025-09-02 RX ORDER — HYDROMORPHONE/SOD CHLOR,ISO/PF 2 MG/10 ML
0.2 SYRINGE (ML) INJECTION EVERY 12 HOURS
Refills: 0 | Status: DISCONTINUED | OUTPATIENT
Start: 2025-09-02 | End: 2025-09-03

## 2025-09-02 RX ADMIN — APIXABAN 5 MILLIGRAM(S): 2.5 TABLET, FILM COATED ORAL at 17:23

## 2025-09-02 RX ADMIN — LAMIVUDINE 300 MILLIGRAM(S): 10 SOLUTION ORAL at 11:35

## 2025-09-02 RX ADMIN — DOLUTEGRAVIR SODIUM 50 MILLIGRAM(S): 5 TABLET, FOR SUSPENSION ORAL at 11:34

## 2025-09-02 RX ADMIN — CYCLOBENZAPRINE HYDROCHLORIDE 5 MILLIGRAM(S): 15 CAPSULE, EXTENDED RELEASE ORAL at 14:09

## 2025-09-02 RX ADMIN — CYCLOBENZAPRINE HYDROCHLORIDE 5 MILLIGRAM(S): 15 CAPSULE, EXTENDED RELEASE ORAL at 21:13

## 2025-09-02 RX ADMIN — Medication 975 MILLIGRAM(S): at 05:17

## 2025-09-02 RX ADMIN — GABAPENTIN 300 MILLIGRAM(S): 400 CAPSULE ORAL at 17:23

## 2025-09-02 RX ADMIN — TAMSULOSIN HYDROCHLORIDE 0.4 MILLIGRAM(S): 0.4 CAPSULE ORAL at 21:13

## 2025-09-02 RX ADMIN — FINASTERIDE 5 MILLIGRAM(S): 1 TABLET, FILM COATED ORAL at 21:14

## 2025-09-02 RX ADMIN — FLUOXETINE HYDROCHLORIDE 10 MILLIGRAM(S): 20 CAPSULE ORAL at 11:34

## 2025-09-02 RX ADMIN — CYCLOBENZAPRINE HYDROCHLORIDE 5 MILLIGRAM(S): 15 CAPSULE, EXTENDED RELEASE ORAL at 05:17

## 2025-09-02 RX ADMIN — Medication 975 MILLIGRAM(S): at 21:13

## 2025-09-02 RX ADMIN — Medication 40 MILLIGRAM(S): at 05:18

## 2025-09-02 RX ADMIN — APIXABAN 5 MILLIGRAM(S): 2.5 TABLET, FILM COATED ORAL at 05:18

## 2025-09-02 RX ADMIN — GABAPENTIN 300 MILLIGRAM(S): 400 CAPSULE ORAL at 05:18

## 2025-09-02 RX ADMIN — Medication 975 MILLIGRAM(S): at 14:09

## 2025-09-03 VITALS
SYSTOLIC BLOOD PRESSURE: 123 MMHG | OXYGEN SATURATION: 96 % | TEMPERATURE: 98 F | HEART RATE: 63 BPM | RESPIRATION RATE: 18 BRPM | DIASTOLIC BLOOD PRESSURE: 83 MMHG

## 2025-09-03 LAB
ANION GAP SERPL CALC-SCNC: 7 MMOL/L — SIGNIFICANT CHANGE UP (ref 5–17)
BUN SERPL-MCNC: 14 MG/DL — SIGNIFICANT CHANGE UP (ref 7–23)
CALCIUM SERPL-MCNC: 9.3 MG/DL — SIGNIFICANT CHANGE UP (ref 8.4–10.5)
CHLORIDE SERPL-SCNC: 107 MMOL/L — SIGNIFICANT CHANGE UP (ref 96–108)
CO2 SERPL-SCNC: 25 MMOL/L — SIGNIFICANT CHANGE UP (ref 22–31)
CREAT SERPL-MCNC: 1.57 MG/DL — HIGH (ref 0.5–1.3)
EGFR: 49 ML/MIN/1.73M2 — LOW
EGFR: 49 ML/MIN/1.73M2 — LOW
GLUCOSE SERPL-MCNC: 93 MG/DL — SIGNIFICANT CHANGE UP (ref 70–99)
HCT VFR BLD CALC: 33.6 % — LOW (ref 39–50)
HGB BLD-MCNC: 10.8 G/DL — LOW (ref 13–17)
MCHC RBC-ENTMCNC: 30.9 PG — SIGNIFICANT CHANGE UP (ref 27–34)
MCHC RBC-ENTMCNC: 32.1 G/DL — SIGNIFICANT CHANGE UP (ref 32–36)
MCV RBC AUTO: 96.3 FL — SIGNIFICANT CHANGE UP (ref 80–100)
NRBC # BLD AUTO: 0 K/UL — SIGNIFICANT CHANGE UP (ref 0–0)
NRBC # FLD: 0 K/UL — SIGNIFICANT CHANGE UP (ref 0–0)
NRBC BLD AUTO-RTO: 0 /100 WBCS — SIGNIFICANT CHANGE UP (ref 0–0)
PLATELET # BLD AUTO: 448 K/UL — HIGH (ref 150–400)
PMV BLD: 8.9 FL — SIGNIFICANT CHANGE UP (ref 7–13)
POTASSIUM SERPL-MCNC: 4.5 MMOL/L — SIGNIFICANT CHANGE UP (ref 3.5–5.3)
POTASSIUM SERPL-SCNC: 4.5 MMOL/L — SIGNIFICANT CHANGE UP (ref 3.5–5.3)
RBC # BLD: 3.49 M/UL — LOW (ref 4.2–5.8)
RBC # FLD: 12.5 % — SIGNIFICANT CHANGE UP (ref 10.3–14.5)
SODIUM SERPL-SCNC: 139 MMOL/L — SIGNIFICANT CHANGE UP (ref 135–145)
WBC # BLD: 6.91 K/UL — SIGNIFICANT CHANGE UP (ref 3.8–10.5)
WBC # FLD AUTO: 6.91 K/UL — SIGNIFICANT CHANGE UP (ref 3.8–10.5)

## 2025-09-03 PROCEDURE — 97116 GAIT TRAINING THERAPY: CPT

## 2025-09-03 PROCEDURE — 93005 ELECTROCARDIOGRAM TRACING: CPT

## 2025-09-03 PROCEDURE — 85027 COMPLETE CBC AUTOMATED: CPT

## 2025-09-03 PROCEDURE — 86357 NK CELLS TOTAL COUNT: CPT

## 2025-09-03 PROCEDURE — 93970 EXTREMITY STUDY: CPT

## 2025-09-03 PROCEDURE — 83540 ASSAY OF IRON: CPT

## 2025-09-03 PROCEDURE — C1769: CPT

## 2025-09-03 PROCEDURE — 71045 X-RAY EXAM CHEST 1 VIEW: CPT

## 2025-09-03 PROCEDURE — 87102 FUNGUS ISOLATION CULTURE: CPT

## 2025-09-03 PROCEDURE — 86923 COMPATIBILITY TEST ELECTRIC: CPT

## 2025-09-03 PROCEDURE — P9040: CPT

## 2025-09-03 PROCEDURE — 97530 THERAPEUTIC ACTIVITIES: CPT

## 2025-09-03 PROCEDURE — 80048 BASIC METABOLIC PNL TOTAL CA: CPT

## 2025-09-03 PROCEDURE — 84484 ASSAY OF TROPONIN QUANT: CPT

## 2025-09-03 PROCEDURE — C9399: CPT

## 2025-09-03 PROCEDURE — 82947 ASSAY GLUCOSE BLOOD QUANT: CPT

## 2025-09-03 PROCEDURE — 73502 X-RAY EXAM HIP UNI 2-3 VIEWS: CPT

## 2025-09-03 PROCEDURE — 82805 BLOOD GASES W/O2 SATURATION: CPT

## 2025-09-03 PROCEDURE — 87015 SPECIMEN INFECT AGNT CONCNTJ: CPT

## 2025-09-03 PROCEDURE — 87070 CULTURE OTHR SPECIMN AEROBIC: CPT

## 2025-09-03 PROCEDURE — C1776: CPT

## 2025-09-03 PROCEDURE — 36430 TRANSFUSION BLD/BLD COMPNT: CPT

## 2025-09-03 PROCEDURE — 97110 THERAPEUTIC EXERCISES: CPT

## 2025-09-03 PROCEDURE — 85610 PROTHROMBIN TIME: CPT

## 2025-09-03 PROCEDURE — 84132 ASSAY OF SERUM POTASSIUM: CPT

## 2025-09-03 PROCEDURE — 82330 ASSAY OF CALCIUM: CPT

## 2025-09-03 PROCEDURE — P9016: CPT

## 2025-09-03 PROCEDURE — 83880 ASSAY OF NATRIURETIC PEPTIDE: CPT

## 2025-09-03 PROCEDURE — 83550 IRON BINDING TEST: CPT

## 2025-09-03 PROCEDURE — 82728 ASSAY OF FERRITIN: CPT

## 2025-09-03 PROCEDURE — 88300 SURGICAL PATH GROSS: CPT

## 2025-09-03 PROCEDURE — 72170 X-RAY EXAM OF PELVIS: CPT

## 2025-09-03 PROCEDURE — 97535 SELF CARE MNGMENT TRAINING: CPT

## 2025-09-03 PROCEDURE — 99233 SBSQ HOSP IP/OBS HIGH 50: CPT

## 2025-09-03 PROCEDURE — C1713: CPT

## 2025-09-03 PROCEDURE — 87075 CULTR BACTERIA EXCEPT BLOOD: CPT

## 2025-09-03 PROCEDURE — 86900 BLOOD TYPING SEROLOGIC ABO: CPT

## 2025-09-03 PROCEDURE — 86850 RBC ANTIBODY SCREEN: CPT

## 2025-09-03 PROCEDURE — 36415 COLL VENOUS BLD VENIPUNCTURE: CPT

## 2025-09-03 PROCEDURE — C1889: CPT

## 2025-09-03 PROCEDURE — 84295 ASSAY OF SERUM SODIUM: CPT

## 2025-09-03 PROCEDURE — 93306 TTE W/DOPPLER COMPLETE: CPT

## 2025-09-03 PROCEDURE — 86355 B CELLS TOTAL COUNT: CPT

## 2025-09-03 PROCEDURE — 84466 ASSAY OF TRANSFERRIN: CPT

## 2025-09-03 PROCEDURE — 71275 CT ANGIOGRAPHY CHEST: CPT

## 2025-09-03 PROCEDURE — 97161 PT EVAL LOW COMPLEX 20 MIN: CPT

## 2025-09-03 PROCEDURE — 86359 T CELLS TOTAL COUNT: CPT

## 2025-09-03 PROCEDURE — 86901 BLOOD TYPING SEROLOGIC RH(D): CPT

## 2025-09-03 PROCEDURE — 87116 MYCOBACTERIA CULTURE: CPT

## 2025-09-03 PROCEDURE — 73552 X-RAY EXAM OF FEMUR 2/>: CPT

## 2025-09-03 PROCEDURE — 85045 AUTOMATED RETICULOCYTE COUNT: CPT

## 2025-09-03 PROCEDURE — 86360 T CELL ABSOLUTE COUNT/RATIO: CPT

## 2025-09-03 PROCEDURE — 97165 OT EVAL LOW COMPLEX 30 MIN: CPT

## 2025-09-03 PROCEDURE — 76000 FLUOROSCOPY <1 HR PHYS/QHP: CPT

## 2025-09-03 PROCEDURE — 87205 SMEAR GRAM STAIN: CPT

## 2025-09-03 PROCEDURE — 85379 FIBRIN DEGRADATION QUANT: CPT

## 2025-09-03 RX ADMIN — Medication 325 MILLIGRAM(S): at 12:30

## 2025-09-03 RX ADMIN — CYCLOBENZAPRINE HYDROCHLORIDE 5 MILLIGRAM(S): 15 CAPSULE, EXTENDED RELEASE ORAL at 05:44

## 2025-09-03 RX ADMIN — Medication 975 MILLIGRAM(S): at 05:43

## 2025-09-03 RX ADMIN — APIXABAN 5 MILLIGRAM(S): 2.5 TABLET, FILM COATED ORAL at 05:44

## 2025-09-03 RX ADMIN — LAMIVUDINE 300 MILLIGRAM(S): 10 SOLUTION ORAL at 12:30

## 2025-09-03 RX ADMIN — Medication 40 MILLIGRAM(S): at 05:44

## 2025-09-03 RX ADMIN — FLUOXETINE HYDROCHLORIDE 10 MILLIGRAM(S): 20 CAPSULE ORAL at 12:30

## 2025-09-03 RX ADMIN — GABAPENTIN 300 MILLIGRAM(S): 400 CAPSULE ORAL at 05:44

## 2025-09-03 RX ADMIN — DOLUTEGRAVIR SODIUM 50 MILLIGRAM(S): 5 TABLET, FOR SUSPENSION ORAL at 12:31

## 2025-09-05 ENCOUNTER — NON-APPOINTMENT (OUTPATIENT)
Age: 65
End: 2025-09-05

## 2025-09-08 ENCOUNTER — APPOINTMENT (OUTPATIENT)
Dept: ORTHOPEDIC SURGERY | Facility: CLINIC | Age: 65
End: 2025-09-08
Payer: MEDICARE

## 2025-09-08 ENCOUNTER — RESULT REVIEW (OUTPATIENT)
Age: 65
End: 2025-09-08

## 2025-09-08 VITALS
WEIGHT: 212 LBS | TEMPERATURE: 96 F | HEART RATE: 88 BPM | HEIGHT: 75 IN | OXYGEN SATURATION: 96 % | BODY MASS INDEX: 26.36 KG/M2 | SYSTOLIC BLOOD PRESSURE: 109 MMHG | DIASTOLIC BLOOD PRESSURE: 52 MMHG

## 2025-09-08 DIAGNOSIS — T84.031A: ICD-10-CM

## 2025-09-08 DIAGNOSIS — Z96.649 PRESENCE OF UNSPECIFIED ARTIFICIAL HIP JOINT: ICD-10-CM

## 2025-09-08 DIAGNOSIS — Z96.642 PRESENCE OF LEFT ARTIFICIAL HIP JOINT: ICD-10-CM

## 2025-09-08 PROBLEM — E78.5 HYPERLIPIDEMIA, UNSPECIFIED: Chronic | Status: ACTIVE | Noted: 2025-07-23

## 2025-09-08 PROBLEM — I63.9 CEREBRAL INFARCTION, UNSPECIFIED: Chronic | Status: ACTIVE | Noted: 2025-07-23

## 2025-09-08 PROCEDURE — 99024 POSTOP FOLLOW-UP VISIT: CPT

## 2025-09-08 RX ORDER — DOLUTEGRAVIR SODIUM AND LAMIVUDINE 50; 300 MG/1; MG/1
1 TABLET, FILM COATED ORAL
Refills: 0 | DISCHARGE

## 2025-09-08 RX ORDER — ERGOCALCIFEROL 1.25 MG/1
CAPSULE ORAL
Refills: 0 | DISCHARGE

## 2025-09-08 RX ORDER — FLUOXETINE HYDROCHLORIDE 20 MG/1
1 CAPSULE ORAL
Refills: 0 | DISCHARGE

## 2025-09-08 RX ORDER — DOXYCYCLINE HYCLATE 100 MG
0 TABLET ORAL
Refills: 0 | DISCHARGE

## 2025-09-08 RX ORDER — DOXYCYCLINE HYCLATE 100 MG
1 TABLET ORAL
Refills: 0 | DISCHARGE

## 2025-09-08 RX ORDER — TESTOSTERONE 5.5 MG/1
200 GEL NASAL
Refills: 0 | DISCHARGE

## 2025-09-08 RX ORDER — B1/B2/B3/B5/B6/B12/VIT C/FOLIC 500-0.5 MG
TABLET ORAL
Refills: 0 | DISCHARGE

## 2025-09-09 ENCOUNTER — APPOINTMENT (OUTPATIENT)
Age: 65
End: 2025-09-09

## 2025-09-11 ENCOUNTER — TRANSCRIPTION ENCOUNTER (OUTPATIENT)
Age: 65
End: 2025-09-11

## (undated) DEVICE — Device

## (undated) DEVICE — GLV 7.5 PROTEXIS ORTHO (CREAM)

## (undated) DEVICE — MIDAS REX LEGEND CYLINDER FLUTED SM BORE 6.0MM X 15CM

## (undated) DEVICE — SUT RETRIEVER LASSO HOOK

## (undated) DEVICE — BLADE SURGICAL #15 CARBON

## (undated) DEVICE — PACK TOTAL HIP

## (undated) DEVICE — SUT STRATAFIX SPIRAL MONOCRYL PLUS 3-0 30CM PS-1 UNDYED

## (undated) DEVICE — PREP DURAPREP 26CC

## (undated) DEVICE — DRAPE HIP W POUCHES 87X115X134"

## (undated) DEVICE — SPECIMEN CONTAINER 4OZ

## (undated) DEVICE — SUT PDS II PLUS 1 27" CT-1

## (undated) DEVICE — SUT PDS II PLUS 1 27" CP-1

## (undated) DEVICE — WARMING BLANKET UPPER ADULT

## (undated) DEVICE — SUCTION YANKAUER OPEN TIP NO VENT CURVE

## (undated) DEVICE — HOOD T7 PLUS PEELAWAY

## (undated) DEVICE — SUT STRATAFIX SYMMETRIC PDS PLUS 1 18" CT

## (undated) DEVICE — SAW BLADE STRYKER SAGITTAL 81.5X12.5X1.19MM

## (undated) DEVICE — SUT VICRYL 2-0 27" CT-1 UNDYED

## (undated) DEVICE — SUT STRATAFIX SPIRAL PDS PLUS 0 23X23CM CP-2 VIOLET

## (undated) DEVICE — WOUND IRR IRRISEPT W 0.5 CHG

## (undated) DEVICE — SUT STRATAFIX SPIRAL PDS PLUS 2-0 36X36CM CT-1 VIOLET

## (undated) DEVICE — MIDAS REX LEGEND TAPERED LG BORE 3.0MM X 9CM

## (undated) DEVICE — DRAPE ISOLATION W INCISE FILM & POUCH

## (undated) DEVICE — SUT MONOCRYL 3-0 18" PS-1

## (undated) DEVICE — SUT PDS PLUS 0 27" CT-1

## (undated) DEVICE — DRILL BIT STRYKER ORTHO LOKG 4.2X360MM

## (undated) DEVICE — SUT STRATAFIX SPIRAL PDS PLUS 1 30X30CM OS-6 VIOLET

## (undated) DEVICE — SAW BLADE STRYKER SAGITTAL 12.5X73X0.64MM

## (undated) DEVICE — DRILL BIT S&N ACETABULAR 25MM

## (undated) DEVICE — SAW BLADE STRYKER SAGITTAL DUAL CUT 75X18X1.27MM

## (undated) DEVICE — DRAPE U (BLUE) 60 X 72"

## (undated) DEVICE — SUT VICRYL PLUS 2-0 27" CT-1 UNDYED

## (undated) DEVICE — DRSG COBAN 6"

## (undated) DEVICE — DRAPE LIGHT HANDLE COVER (BLUE)

## (undated) DEVICE — SUT ETHIBOND 5 4-30" V-37

## (undated) DEVICE — MIDAS REX LEGEND TAPERED LG BORE 3.0MM X 26CM

## (undated) DEVICE — DRSG DERMABOND PRINEO 22CM

## (undated) DEVICE — SUT STRATAFIX SYMMETRIC PDS PLUS 1 60CM CTX VIOLET

## (undated) DEVICE — ELCTR STRYKER NEPTUNE SMOKE EVACUATION PENCIL (GREEN)

## (undated) DEVICE — ZIMMER BACTISURE WOUND LAVAGE 1000ML

## (undated) DEVICE — WOUND IRR SURGIPHOR

## (undated) DEVICE — SUT MONOCRYL 3-0 27" PS-1 UNDYED

## (undated) DEVICE — PREP CHLORAPREP HI-LITE ORANGE 26ML

## (undated) DEVICE — SUT VICRYL 0 36" CT-1 UNDYED

## (undated) DEVICE — SUT VICRYL PLUS 2-0 27" SH UNDYED

## (undated) DEVICE — DRAPE TOWEL BLUE 17" X 24"

## (undated) DEVICE — SUT PDS II 2-0 27" CT-2

## (undated) DEVICE — MIDAS REX MR8 METAL CUTTER 3MM X 9CM

## (undated) DEVICE — GOWN XXXL

## (undated) DEVICE — ELCTR BOVIE TIP CLEANER SCRATCH PAD 1 7/8 X 1 7/8"

## (undated) DEVICE — NDL HYPO SAFE 22G X 1.5" (BLACK)

## (undated) DEVICE — SAW BLADE STRYKER SAGITTAL DUAL CUT TEETH 35X64X.64MM